# Patient Record
Sex: MALE | Race: WHITE | NOT HISPANIC OR LATINO | Employment: OTHER | URBAN - METROPOLITAN AREA
[De-identification: names, ages, dates, MRNs, and addresses within clinical notes are randomized per-mention and may not be internally consistent; named-entity substitution may affect disease eponyms.]

---

## 2019-10-01 ENCOUNTER — CLINICAL SUPPORT (OUTPATIENT)
Dept: FAMILY MEDICINE | Facility: CLINIC | Age: 84
End: 2019-10-01
Payer: MEDICARE

## 2019-10-01 VITALS — TEMPERATURE: 98 F

## 2019-10-01 DIAGNOSIS — Z23 NEED FOR INFLUENZA VACCINATION: Primary | ICD-10-CM

## 2019-10-01 PROCEDURE — 90662 FLU VACCINE - HIGH DOSE (65+) PRESERVATIVE FREE IM: ICD-10-PCS | Mod: S$GLB,,, | Performed by: NURSE PRACTITIONER

## 2019-10-01 PROCEDURE — 90662 IIV NO PRSV INCREASED AG IM: CPT | Mod: S$GLB,,, | Performed by: NURSE PRACTITIONER

## 2019-10-01 PROCEDURE — G0008 FLU VACCINE - HIGH DOSE (65+) PRESERVATIVE FREE IM: ICD-10-PCS | Mod: S$GLB,,, | Performed by: NURSE PRACTITIONER

## 2019-10-01 PROCEDURE — G0008 ADMIN INFLUENZA VIRUS VAC: HCPCS | Mod: S$GLB,,, | Performed by: NURSE PRACTITIONER

## 2019-10-02 LAB — TSH SERPL-ACNC: 5.87 MIU/L (ref 0.4–4.5)

## 2019-10-08 ENCOUNTER — OFFICE VISIT (OUTPATIENT)
Dept: FAMILY MEDICINE | Facility: CLINIC | Age: 84
End: 2019-10-08
Payer: MEDICARE

## 2019-10-08 VITALS
HEIGHT: 70 IN | SYSTOLIC BLOOD PRESSURE: 132 MMHG | DIASTOLIC BLOOD PRESSURE: 88 MMHG | WEIGHT: 268 LBS | BODY MASS INDEX: 38.37 KG/M2 | HEART RATE: 72 BPM

## 2019-10-08 DIAGNOSIS — I48.0 PAROXYSMAL ATRIAL FIBRILLATION: ICD-10-CM

## 2019-10-08 DIAGNOSIS — E03.9 HYPOTHYROIDISM, UNSPECIFIED TYPE: ICD-10-CM

## 2019-10-08 DIAGNOSIS — I10 HYPERTENSION, UNSPECIFIED TYPE: ICD-10-CM

## 2019-10-08 DIAGNOSIS — E78.5 HYPERLIPIDEMIA, UNSPECIFIED HYPERLIPIDEMIA TYPE: ICD-10-CM

## 2019-10-08 DIAGNOSIS — E66.9 OBESITY, UNSPECIFIED CLASSIFICATION, UNSPECIFIED OBESITY TYPE, UNSPECIFIED WHETHER SERIOUS COMORBIDITY PRESENT: ICD-10-CM

## 2019-10-08 DIAGNOSIS — M17.11 PRIMARY OSTEOARTHRITIS OF RIGHT KNEE: ICD-10-CM

## 2019-10-08 DIAGNOSIS — N18.30 CKD (CHRONIC KIDNEY DISEASE), STAGE III: ICD-10-CM

## 2019-10-08 DIAGNOSIS — Z99.89 AMBULATES WITH CANE: ICD-10-CM

## 2019-10-08 PROBLEM — I48.91 ATRIAL FIBRILLATION: Status: ACTIVE | Noted: 2019-10-08

## 2019-10-08 LAB — HBA1C MFR BLD: 6.5 %

## 2019-10-08 PROCEDURE — 99214 OFFICE O/P EST MOD 30 MIN: CPT | Mod: S$GLB,,, | Performed by: NURSE PRACTITIONER

## 2019-10-08 PROCEDURE — 99214 PR OFFICE/OUTPT VISIT, EST, LEVL IV, 30-39 MIN: ICD-10-PCS | Mod: S$GLB,,, | Performed by: NURSE PRACTITIONER

## 2019-10-08 PROCEDURE — 83036 HEMOGLOBIN GLYCOSYLATED A1C: CPT | Mod: QW,,, | Performed by: NURSE PRACTITIONER

## 2019-10-08 PROCEDURE — 83036 POCT HEMOGLOBIN A1C: ICD-10-PCS | Mod: QW,,, | Performed by: NURSE PRACTITIONER

## 2019-10-08 RX ORDER — LEVOTHYROXINE SODIUM 75 UG/1
75 TABLET ORAL DAILY
Qty: 30 TABLET | Refills: 11 | Status: SHIPPED | OUTPATIENT
Start: 2019-10-08 | End: 2020-04-30

## 2019-10-08 RX ORDER — ACETAMINOPHEN AND CODEINE PHOSPHATE 300; 60 MG/1; MG/1
1-2 TABLET ORAL DAILY
Refills: 5 | COMMUNITY
Start: 2019-09-09 | End: 2020-05-06 | Stop reason: SDUPTHER

## 2019-10-08 RX ORDER — LEVOTHYROXINE SODIUM 25 UG/1
1 TABLET ORAL DAILY
COMMUNITY
Start: 2019-03-15 | End: 2019-10-08 | Stop reason: DRUGHIGH

## 2019-10-08 RX ORDER — INSULIN GLARGINE 100 [IU]/ML
31 INJECTION, SOLUTION SUBCUTANEOUS DAILY
Refills: 5 | COMMUNITY
Start: 2019-09-25 | End: 2020-02-18

## 2019-10-08 NOTE — PROGRESS NOTES
Patient ID: Alex Hatch is a 84 y.o. male.    Chief Complaint: Follow-up and Diabetes    Presents for check up. Doing ok. Recently had tsh drawn. Would like to discuss results. Feels ok. Does not sleep great. Naps some during the day. Has intermittent knee pain. Did get some relief. After injection. Was considering knee brace but felt like the 'salesman; was giving me run around. Uses cane for ambulation. Sugars have been up and down. Had 1episode that felt like sugar dropped. Felt weak and dizzy. Ate candy and felt better. Did not check sugar.           Past Medical History:   Diagnosis Date    Atrial fibrillation     Dr Lux    CKD (chronic kidney disease), stage III     Diabetes mellitus, type 2     Hyperlipidemia     Hypertension      Past Surgical History:   Procedure Laterality Date    CARDIAC PACEMAKER PLACEMENT  5/2013    Dr Lux    COLONOSCOPY W/ POLYPECTOMY  2011    Dr Mar, repeat in 3 years    CORONARY ARTERY BYPASS GRAFT  1993    cassnadra           Tobacco History:  reports that he has never smoked. He has never used smokeless tobacco.      Review of patient's allergies indicates:   Allergen Reactions    Iodinated contrast media        Current Outpatient Medications:     acetaminophen-codeine 300-60mg (TYLENOL #4) 300-60 mg Tab, Take 1-2 tablets by mouth once daily., Disp: , Rfl: 5    amlodipine-benazepril 5-20 mg (LOTREL) 5-20 mg per capsule, Take 1 capsule by mouth once daily., Disp: , Rfl:     blood sugar diagnostic Strp, Check blood glucose 4 times a day - before meals and at bedtime., Disp: 400 strip, Rfl: 3    dorzolamide-timolol 2-0.5% (COSOPT) 2-0.5 % ophthalmic solution, Place 1 drop into both eyes 2 (two) times daily., Disp: , Rfl: 3    hydrALAZINE (APRESOLINE) 50 MG tablet, Take 50 mg by mouth once daily. , Disp: , Rfl:     lancets Misc, Check blood glucose 4 times a day - before meals and at bedtime., Disp: 400 each, Rfl: 3    LANTUS SOLOSTAR U-100 INSULIN glargine  "100 units/mL (3mL) SubQ pen, Inject 31 Units into the skin once daily., Disp: , Rfl: 5    levothyroxine (SYNTHROID) 75 MCG tablet, Take 1 tablet (75 mcg total) by mouth once daily., Disp: 30 tablet, Rfl: 11    lovastatin (MEVACOR) 20 MG tablet, Take 20 mg by mouth every evening.  , Disp: , Rfl:     warfarin (COUMADIN) 5 MG tablet, Take 7 mg by mouth once daily., Disp: , Rfl:     zolpidem (AMBIEN) 10 mg Tab, Take 10 mg by mouth nightly as needed., Disp: , Rfl: 5    Review of Systems   Constitutional: Negative for fatigue, fever and unexpected weight change.   HENT: Negative for ear pain, sinus pressure and sore throat.    Respiratory: Negative for cough and shortness of breath.    Cardiovascular: Negative for chest pain and leg swelling.   Gastrointestinal: Negative for abdominal pain, constipation, nausea and vomiting.   Genitourinary: Negative for dysuria, frequency and urgency.   Musculoskeletal: Negative for arthralgias.        Knee pain   Skin: Negative for rash.   Neurological: Negative for dizziness, weakness and headaches.   Psychiatric/Behavioral: Positive for sleep disturbance.          Objective:      Vitals:    10/08/19 1000   BP: 132/88   Pulse: 72   Weight: 121.6 kg (268 lb)   Height: 5' 9.5" (1.765 m)     Physical Exam   Constitutional: He is oriented to person, place, and time. He appears well-developed and well-nourished.   Obese. Ambulates with cane   HENT:   Right Ear: External ear normal.   Left Ear: External ear normal.   Eyes: Pupils are equal, round, and reactive to light. EOM are normal.   Neck: Normal range of motion. Neck supple.   Cardiovascular: Normal rate, regular rhythm and normal heart sounds.   Pulses:       Dorsalis pedis pulses are 2+ on the right side, and 2+ on the left side.        Posterior tibial pulses are 2+ on the right side.   Pulmonary/Chest: Effort normal and breath sounds normal.   Abdominal: Soft. Bowel sounds are normal.   Musculoskeletal: He exhibits no edema or " deformity.        Right knee: He exhibits decreased range of motion and effusion.        Right foot: There is normal range of motion and no deformity.        Left foot: There is no deformity.   crepitant   Feet:   Right Foot:   Protective Sensation: 5 sites tested. 5 sites sensed.   Skin Integrity: Negative for ulcer or callus.   Left Foot:   Protective Sensation: 5 sites tested. 5 sites sensed.   Skin Integrity: Negative for ulcer, skin breakdown or callus.   Lymphadenopathy:     He has no cervical adenopathy.   Neurological: He is alert and oriented to person, place, and time.   Skin: Skin is warm and dry.   Psychiatric: He has a normal mood and affect. His behavior is normal.         Assessment:       1. Diabetes mellitus type 2, uncontrolled, with complications    2. Hypertension, unspecified type    3. CKD (chronic kidney disease), stage III    4. Hyperlipidemia, unspecified hyperlipidemia type    5. Obesity, unspecified classification, unspecified obesity type, unspecified whether serious comorbidity present    6. Paroxysmal atrial fibrillation    7. Hypothyroidism, unspecified type    8. Ambulates with cane    9. Primary osteoarthritis of right knee           Plan:       Diabetes mellitus type 2, uncontrolled, with complications  Comments:  decrease lantus to 22. currently not taking 31units. takes between 22-26  Orders:  -     Hemoglobin A1C, POCT    Hypertension, unspecified type    CKD (chronic kidney disease), stage III    Hyperlipidemia, unspecified hyperlipidemia type    Obesity, unspecified classification, unspecified obesity type, unspecified whether serious comorbidity present    Paroxysmal atrial fibrillation  Comments:  inr monitored by Dr. Lux    Hypothyroidism, unspecified type  Comments:  incrrease levothyroxine to 75mcg. repeat tsh in 6 weeks  Orders:  -     levothyroxine (SYNTHROID) 75 MCG tablet; Take 1 tablet (75 mcg total) by mouth once daily.  Dispense: 30 tablet; Refill: 11  -     TSH  w/reflex to FT4; Future; Expected date: 10/08/2019    Ambulates with cane    Primary osteoarthritis of right knee      Follow up in about 3 months (around 1/8/2020).        10/8/2019 Leana Perez NP

## 2019-11-07 ENCOUNTER — TELEPHONE (OUTPATIENT)
Dept: FAMILY MEDICINE | Facility: CLINIC | Age: 84
End: 2019-11-07

## 2019-11-15 ENCOUNTER — TELEPHONE (OUTPATIENT)
Dept: FAMILY MEDICINE | Facility: CLINIC | Age: 84
End: 2019-11-15

## 2019-11-15 DIAGNOSIS — M25.511 ACUTE PAIN OF RIGHT SHOULDER: Primary | ICD-10-CM

## 2019-11-15 NOTE — TELEPHONE ENCOUNTER
Patient calling c/o arthritis flare in R shoulder.  Would like visit to have checked.  Patient states when this happens Leana usually orders an xray as well.  Patient requests to have xray orders sent to [facility].     Pt: [phone]  -DN

## 2019-11-18 ENCOUNTER — HOSPITAL ENCOUNTER (OUTPATIENT)
Dept: RADIOLOGY | Facility: HOSPITAL | Age: 84
Discharge: HOME | End: 2019-11-18
Attending: NURSE PRACTITIONER
Payer: MEDICARE

## 2019-11-18 DIAGNOSIS — M25.511 ACUTE PAIN OF RIGHT SHOULDER: ICD-10-CM

## 2019-11-18 PROCEDURE — 73030 X-RAY EXAM OF SHOULDER: CPT | Mod: TC,PO,RT

## 2019-11-19 ENCOUNTER — OFFICE VISIT (OUTPATIENT)
Dept: FAMILY MEDICINE | Facility: CLINIC | Age: 84
End: 2019-11-19
Payer: MEDICARE

## 2019-11-19 VITALS
DIASTOLIC BLOOD PRESSURE: 84 MMHG | HEIGHT: 70 IN | HEART RATE: 80 BPM | BODY MASS INDEX: 38.51 KG/M2 | WEIGHT: 269 LBS | SYSTOLIC BLOOD PRESSURE: 138 MMHG

## 2019-11-19 DIAGNOSIS — G89.29 CHRONIC RIGHT SHOULDER PAIN: ICD-10-CM

## 2019-11-19 DIAGNOSIS — E78.5 HYPERLIPIDEMIA, UNSPECIFIED HYPERLIPIDEMIA TYPE: ICD-10-CM

## 2019-11-19 DIAGNOSIS — I48.0 PAROXYSMAL ATRIAL FIBRILLATION: ICD-10-CM

## 2019-11-19 DIAGNOSIS — I10 HYPERTENSION, UNSPECIFIED TYPE: ICD-10-CM

## 2019-11-19 DIAGNOSIS — N18.30 CKD (CHRONIC KIDNEY DISEASE), STAGE III: ICD-10-CM

## 2019-11-19 DIAGNOSIS — M25.511 CHRONIC RIGHT SHOULDER PAIN: ICD-10-CM

## 2019-11-19 LAB
HBA1C MFR BLD: 6.9 %
TSH SERPL-ACNC: 1.99 MIU/L (ref 0.4–4.5)

## 2019-11-19 PROCEDURE — 20605 INTERMEDIATE JOINT ASPIRATION/INJECTION: ICD-10-PCS | Mod: RT,S$GLB,, | Performed by: NURSE PRACTITIONER

## 2019-11-19 PROCEDURE — 20605 DRAIN/INJ JOINT/BURSA W/O US: CPT | Mod: RT,S$GLB,, | Performed by: NURSE PRACTITIONER

## 2019-11-19 PROCEDURE — 99214 OFFICE O/P EST MOD 30 MIN: CPT | Mod: 25,S$GLB,, | Performed by: NURSE PRACTITIONER

## 2019-11-19 PROCEDURE — 1159F MED LIST DOCD IN RCRD: CPT | Mod: S$GLB,,, | Performed by: NURSE PRACTITIONER

## 2019-11-19 PROCEDURE — 1125F AMNT PAIN NOTED PAIN PRSNT: CPT | Mod: S$GLB,,, | Performed by: NURSE PRACTITIONER

## 2019-11-19 PROCEDURE — 1159F PR MEDICATION LIST DOCUMENTED IN MEDICAL RECORD: ICD-10-PCS | Mod: S$GLB,,, | Performed by: NURSE PRACTITIONER

## 2019-11-19 PROCEDURE — 99214 PR OFFICE/OUTPT VISIT, EST, LEVL IV, 30-39 MIN: ICD-10-PCS | Mod: 25,S$GLB,, | Performed by: NURSE PRACTITIONER

## 2019-11-19 PROCEDURE — 1125F PR PAIN SEVERITY QUANTIFIED, PAIN PRESENT: ICD-10-PCS | Mod: S$GLB,,, | Performed by: NURSE PRACTITIONER

## 2019-11-19 PROCEDURE — 83036 POCT HEMOGLOBIN A1C: ICD-10-PCS | Mod: QW,,, | Performed by: NURSE PRACTITIONER

## 2019-11-19 PROCEDURE — 83036 HEMOGLOBIN GLYCOSYLATED A1C: CPT | Mod: QW,,, | Performed by: NURSE PRACTITIONER

## 2019-11-19 RX ORDER — TRIAMCINOLONE ACETONIDE 40 MG/ML
40 INJECTION, SUSPENSION INTRA-ARTICULAR; INTRAMUSCULAR
Status: COMPLETED | OUTPATIENT
Start: 2019-11-19 | End: 2019-11-19

## 2019-11-19 RX ORDER — LIDOCAINE HYDROCHLORIDE 10 MG/ML
1 INJECTION INFILTRATION; PERINEURAL
Status: COMPLETED | OUTPATIENT
Start: 2019-11-19 | End: 2019-11-19

## 2019-11-19 RX ADMIN — LIDOCAINE HYDROCHLORIDE 1 ML: 10 INJECTION INFILTRATION; PERINEURAL at 01:11

## 2019-11-19 RX ADMIN — TRIAMCINOLONE ACETONIDE 40 MG: 40 INJECTION, SUSPENSION INTRA-ARTICULAR; INTRAMUSCULAR at 01:11

## 2019-11-19 NOTE — PROGRESS NOTES
SUBJECTIVE:    Patient ID: Alex Hatch is a 84 y.o. male.    Chief Complaint: Shoulder Pain (right shoulder - ongoing. did not bring bottles, states he does not need refills -ac)    Presents with complaints of right shoulder pain that seems to get worse.  Had x-ray prior to visit would like to discuss results.  Reports has been having pain off and on times several weeks.  Denies injury or trauma.  Has taken over-the-counter medications with no improvement of symptoms.      Office Visit on 11/19/2019   Component Date Value Ref Range Status    Hemoglobin A1C 11/19/2019 6.9  % Final   Office Visit on 10/08/2019   Component Date Value Ref Range Status    Hemoglobin A1C 10/08/2019 6.5  % Final    TSH w/reflex to FT4 11/18/2019 1.99  0.40 - 4.50 mIU/L Final   Orders Only on 10/01/2019   Component Date Value Ref Range Status    TSH 10/01/2019 5.87* 0.40 - 4.50 mIU/L Final       Past Medical History:   Diagnosis Date    Atrial fibrillation     Dr Lux    CKD (chronic kidney disease), stage III     Diabetes mellitus, type 2     Hyperlipidemia     Hypertension      Past Surgical History:   Procedure Laterality Date    CARDIAC PACEMAKER PLACEMENT  5/2013    Dr Lux    COLONOSCOPY W/ POLYPECTOMY  2011    Dr Mar, repeat in 3 years    CORONARY ARTERY BYPASS GRAFT  1993    cassandra       History reviewed. No pertinent family history.    Marital Status:   Alcohol History:  reports that he does not drink alcohol.  Tobacco History:  reports that he has never smoked. He has never used smokeless tobacco.  Drug History:  reports that he does not use drugs.    Review of patient's allergies indicates:   Allergen Reactions    Iodinated contrast media        Current Outpatient Medications:     acetaminophen-codeine 300-60mg (TYLENOL #4) 300-60 mg Tab, Take 1-2 tablets by mouth once daily., Disp: , Rfl: 5    amlodipine-benazepril 5-20 mg (LOTREL) 5-20 mg per capsule, Take 1 capsule by mouth once daily.,  "Disp: , Rfl:     blood sugar diagnostic Strp, Check blood glucose 4 times a day - before meals and at bedtime., Disp: 400 strip, Rfl: 3    dorzolamide-timolol 2-0.5% (COSOPT) 2-0.5 % ophthalmic solution, Place 1 drop into both eyes 2 (two) times daily., Disp: , Rfl: 3    hydrALAZINE (APRESOLINE) 50 MG tablet, Take 50 mg by mouth once daily. , Disp: , Rfl:     lancets Misc, Check blood glucose 4 times a day - before meals and at bedtime., Disp: 400 each, Rfl: 3    LANTUS SOLOSTAR U-100 INSULIN glargine 100 units/mL (3mL) SubQ pen, Inject 31 Units into the skin once daily., Disp: , Rfl: 5    levothyroxine (SYNTHROID) 75 MCG tablet, Take 1 tablet (75 mcg total) by mouth once daily., Disp: 30 tablet, Rfl: 11    lovastatin (MEVACOR) 20 MG tablet, Take 20 mg by mouth every evening.  , Disp: , Rfl:     warfarin (COUMADIN) 5 MG tablet, Take 7 mg by mouth once daily., Disp: , Rfl:     zolpidem (AMBIEN) 10 mg Tab, Take 10 mg by mouth nightly as needed., Disp: , Rfl: 5    Review of Systems   Constitutional: Negative for unexpected weight change.   HENT: Negative for sore throat.    Eyes: Negative for pain.   Respiratory: Negative for cough and shortness of breath.    Cardiovascular: Negative for chest pain and leg swelling.   Gastrointestinal: Negative for abdominal pain, constipation, nausea and vomiting.   Musculoskeletal: Negative for arthralgias.        Right shoulder pain   Skin: Negative for rash.   Neurological: Negative for dizziness, weakness and headaches.   Psychiatric/Behavioral: Negative for sleep disturbance.          Objective:      Vitals:    11/19/19 1213   BP: 138/84   Pulse: 80   Weight: 122 kg (269 lb)   Height: 5' 10" (1.778 m)     Body mass index is 38.6 kg/m².  Physical Exam   Constitutional: He is oriented to person, place, and time. He appears well-developed and well-nourished.   Neck: Neck supple. No tracheal deviation present.   Cardiovascular: Normal rate and regular rhythm. Exam reveals no " gallop and no friction rub.   No murmur heard.  Pulmonary/Chest: Breath sounds normal. No stridor. He has no wheezes. He has no rales.   Abdominal: Soft. He exhibits no mass. There is no tenderness.   Musculoskeletal: He exhibits no edema or deformity.        Right shoulder: He exhibits decreased range of motion, tenderness and crepitus. He exhibits no effusion.   Lymphadenopathy:     He has no cervical adenopathy.   Neurological: He is alert and oriented to person, place, and time. Coordination normal.   Skin: Skin is warm and dry.   Psychiatric: He has a normal mood and affect. Thought content normal.         Assessment:       1. Diabetes mellitus type 2, uncontrolled, with complications    2. Chronic right shoulder pain    3. Hypertension, unspecified type    4. Hyperlipidemia, unspecified hyperlipidemia type    5. Paroxysmal atrial fibrillation    6. CKD (chronic kidney disease), stage III         Plan:       Diabetes mellitus type 2, uncontrolled, with complications  -     Hemoglobin A1C, POCT    Chronic right shoulder pain  -     Intermediate Joint Aspiration/Injection  -     triamcinolone acetonide injection 40 mg  -     lidocaine HCL 10 mg/ml (1%) injection 1 mL    Hypertension, unspecified type    Hyperlipidemia, unspecified hyperlipidemia type    Paroxysmal atrial fibrillation    CKD (chronic kidney disease), stage III      Follow up in about 3 months (around 2/19/2020).

## 2019-11-19 NOTE — PROCEDURES
Intermediate Joint Aspiration/Injection  Date/Time: 11/19/2019 12:20 PM  Performed by: Leana Perez NP  Authorized by: Leana Perez NP     Consent Done?:  Yes (Written)  Indications:  Pain  Site marked: The procedure site was marked    Timeout: Prior to procedure the correct patient, procedure, and site was verified      Location:  Shoulder  Ultrasonic Guidance for needle placement: No  Needle size:  20 G  Patient tolerance:  Patient tolerated the procedure well with no immediate complications

## 2020-02-18 ENCOUNTER — OFFICE VISIT (OUTPATIENT)
Dept: FAMILY MEDICINE | Facility: CLINIC | Age: 85
End: 2020-02-18
Payer: MEDICARE

## 2020-02-18 VITALS
HEART RATE: 68 BPM | WEIGHT: 270 LBS | SYSTOLIC BLOOD PRESSURE: 110 MMHG | DIASTOLIC BLOOD PRESSURE: 54 MMHG | BODY MASS INDEX: 38.65 KG/M2 | HEIGHT: 70 IN

## 2020-02-18 DIAGNOSIS — E66.9 OBESITY, UNSPECIFIED CLASSIFICATION, UNSPECIFIED OBESITY TYPE, UNSPECIFIED WHETHER SERIOUS COMORBIDITY PRESENT: ICD-10-CM

## 2020-02-18 DIAGNOSIS — I48.0 PAROXYSMAL ATRIAL FIBRILLATION: ICD-10-CM

## 2020-02-18 DIAGNOSIS — M15.9 GENERALIZED OA: ICD-10-CM

## 2020-02-18 DIAGNOSIS — I10 HYPERTENSION, UNSPECIFIED TYPE: ICD-10-CM

## 2020-02-18 DIAGNOSIS — E78.5 HYPERLIPIDEMIA, UNSPECIFIED HYPERLIPIDEMIA TYPE: ICD-10-CM

## 2020-02-18 DIAGNOSIS — N18.30 CKD (CHRONIC KIDNEY DISEASE), STAGE III: ICD-10-CM

## 2020-02-18 LAB — HBA1C MFR BLD: 6.4 %

## 2020-02-18 PROCEDURE — 99214 PR OFFICE/OUTPT VISIT, EST, LEVL IV, 30-39 MIN: ICD-10-PCS | Mod: S$GLB,,, | Performed by: NURSE PRACTITIONER

## 2020-02-18 PROCEDURE — 99214 OFFICE O/P EST MOD 30 MIN: CPT | Mod: S$GLB,,, | Performed by: NURSE PRACTITIONER

## 2020-02-18 PROCEDURE — 83036 HEMOGLOBIN GLYCOSYLATED A1C: CPT | Mod: QW,,, | Performed by: NURSE PRACTITIONER

## 2020-02-18 PROCEDURE — 83036 POCT HEMOGLOBIN A1C: ICD-10-PCS | Mod: QW,,, | Performed by: NURSE PRACTITIONER

## 2020-02-18 RX ORDER — METFORMIN HYDROCHLORIDE 500 MG/1
500 TABLET, EXTENDED RELEASE ORAL 2 TIMES DAILY WITH MEALS
Qty: 180 TABLET | Refills: 3 | Status: SHIPPED | OUTPATIENT
Start: 2020-02-18 | End: 2020-04-30

## 2020-02-18 NOTE — PROGRESS NOTES
SUBJECTIVE:    Patient ID: Alex Hatch is a 84 y.o. male.    Chief Complaint: Diabetes (no bottles, Eye Exam req [facility], set up for foot exam// SW)    84 year old male presents for check up. Overall doing well. Lives with wife. Shoulder and knee pain have resolved. Using cane for ambulation. Has been having some low blood sugar readings. Some as low as 65mg/dl. Feels weak. Chases with candy. Would like to discuss getting off of lantus. Followed regularly by Dr. Lux. Recent inr was 2.2. Has labs ordered for next month.       Office Visit on 11/19/2019   Component Date Value Ref Range Status    Hemoglobin A1C 11/19/2019 6.9  % Final   Office Visit on 10/08/2019   Component Date Value Ref Range Status    Hemoglobin A1C 10/08/2019 6.5  % Final    TSH w/reflex to FT4 11/18/2019 1.99  0.40 - 4.50 mIU/L Final   Orders Only on 10/01/2019   Component Date Value Ref Range Status    TSH 10/01/2019 5.87* 0.40 - 4.50 mIU/L Final       Past Medical History:   Diagnosis Date    Atrial fibrillation     Dr Lux    CKD (chronic kidney disease), stage III     Diabetes mellitus, type 2     Hyperlipidemia     Hypertension      Past Surgical History:   Procedure Laterality Date    CARDIAC PACEMAKER PLACEMENT  5/2013    Dr Lux    COLONOSCOPY W/ POLYPECTOMY  2011    Dr Mar, repeat in 3 years    CORONARY ARTERY BYPASS GRAFT  1993    cassandra       History reviewed. No pertinent family history.    Marital Status:   Alcohol History:  reports that he does not drink alcohol.  Tobacco History:  reports that he has never smoked. He has never used smokeless tobacco.  Drug History:  reports that he does not use drugs.    Review of patient's allergies indicates:   Allergen Reactions    Iodinated contrast media        Current Outpatient Medications:     acetaminophen-codeine 300-60mg (TYLENOL #4) 300-60 mg Tab, Take 1-2 tablets by mouth once daily., Disp: , Rfl: 5    amlodipine-benazepril 5-20 mg (LOTREL)  "5-20 mg per capsule, Take 1 capsule by mouth once daily., Disp: , Rfl:     blood sugar diagnostic Strp, Check blood glucose 4 times a day - before meals and at bedtime., Disp: 400 strip, Rfl: 3    dorzolamide-timolol 2-0.5% (COSOPT) 2-0.5 % ophthalmic solution, Place 1 drop into both eyes 2 (two) times daily., Disp: , Rfl: 3    hydrALAZINE (APRESOLINE) 50 MG tablet, Take 50 mg by mouth once daily. , Disp: , Rfl:     lancets Misc, Check blood glucose 4 times a day - before meals and at bedtime., Disp: 400 each, Rfl: 3    levothyroxine (SYNTHROID) 75 MCG tablet, Take 1 tablet (75 mcg total) by mouth once daily., Disp: 30 tablet, Rfl: 11    lovastatin (MEVACOR) 20 MG tablet, Take 20 mg by mouth every evening.  , Disp: , Rfl:     metFORMIN (GLUCOPHAGE-XR) 500 MG XR 24hr tablet, Take 1 tablet (500 mg total) by mouth 2 (two) times daily with meals., Disp: 180 tablet, Rfl: 3    warfarin (COUMADIN) 5 MG tablet, Take 7 mg by mouth once daily., Disp: , Rfl:     zolpidem (AMBIEN) 10 mg Tab, Take 10 mg by mouth nightly as needed., Disp: , Rfl: 5    Review of Systems   Constitutional: Negative for fatigue, fever and unexpected weight change.   HENT: Negative for ear pain, sinus pressure and sore throat.    Eyes: Negative for pain.   Respiratory: Negative for cough and shortness of breath.    Cardiovascular: Negative for chest pain and leg swelling.   Gastrointestinal: Negative for abdominal pain, constipation, nausea and vomiting.   Genitourinary: Negative for dysuria, frequency and urgency.   Musculoskeletal: Negative for arthralgias.   Skin: Negative for rash.   Neurological: Negative for dizziness, weakness and headaches.   Psychiatric/Behavioral: Negative for sleep disturbance.          Objective:      Vitals:    02/18/20 0915   BP: (!) 110/54   Pulse: 68   Weight: 122.5 kg (270 lb)   Height: 5' 10" (1.778 m)     Body mass index is 38.74 kg/m².  Physical Exam   Constitutional: He is oriented to person, place, and " time. He appears well-developed and well-nourished.   Cane for ambulation   HENT:   Right Ear: External ear normal.   Left Ear: External ear normal.   Mouth/Throat: Oropharynx is clear and moist.   Neck: Normal range of motion. Neck supple. No tracheal deviation present.   Cardiovascular: Normal rate, regular rhythm and normal heart sounds. Exam reveals no gallop and no friction rub.   No murmur heard.  Pulses:       Dorsalis pedis pulses are 2+ on the right side.        Posterior tibial pulses are 2+ on the right side.   Pulmonary/Chest: Effort normal and breath sounds normal. No stridor. He has no wheezes. He has no rales.   Abdominal: Soft. Bowel sounds are normal. He exhibits no mass. There is no tenderness.   Musculoskeletal: Normal range of motion. He exhibits no edema, tenderness or deformity.        Right foot: There is normal range of motion and no deformity.   Crepitus bilaterally   Feet:   Right Foot:   Protective Sensation: 5 sites tested. 5 sites sensed.   Left Foot:   Protective Sensation: 5 sites tested. 5 sites sensed.   Skin Integrity: Negative for skin breakdown.   Lymphadenopathy:     He has no cervical adenopathy.   Neurological: He is alert and oriented to person, place, and time. Coordination normal.   Skin: Skin is warm and dry.   Psychiatric: He has a normal mood and affect. His behavior is normal. Thought content normal.         Assessment:       1. Diabetes mellitus type 2, uncontrolled, with complications    2. Hypertension, unspecified type    3. Hyperlipidemia, unspecified hyperlipidemia type    4. Paroxysmal atrial fibrillation    5. CKD (chronic kidney disease), stage III    6. Obesity, unspecified classification, unspecified obesity type, unspecified whether serious comorbidity present    7. Generalized OA         Plan:       Diabetes mellitus type 2, uncontrolled, with complications  Comments:  ok to discuss stopping lantus. start metformin bid. monitor blood sugar closely. readings  to office in 1 month.   Orders:  -     Hemoglobin A1C, POCT  -     metFORMIN (GLUCOPHAGE-XR) 500 MG XR 24hr tablet; Take 1 tablet (500 mg total) by mouth 2 (two) times daily with meals.  Dispense: 180 tablet; Refill: 3    Hypertension, unspecified type    Hyperlipidemia, unspecified hyperlipidemia type    Paroxysmal atrial fibrillation    CKD (chronic kidney disease), stage III    Obesity, unspecified classification, unspecified obesity type, unspecified whether serious comorbidity present    Generalized OA      Follow up in about 3 months (around 5/18/2020) for medication management.

## 2020-02-28 LAB
ALBUMIN SERPL-MCNC: 4.3 G/DL (ref 3.6–5.1)
ALBUMIN/GLOB SERPL: 1.6 (CALC) (ref 1–2.5)
ALP SERPL-CCNC: 73 U/L (ref 35–144)
ALT SERPL-CCNC: 10 U/L (ref 9–46)
APPEARANCE UR: CLEAR
AST SERPL-CCNC: 15 U/L (ref 10–35)
BACTERIA #/AREA URNS HPF: ABNORMAL /HPF
BACTERIA UR CULT: ABNORMAL
BASOPHILS # BLD AUTO: 84 CELLS/UL (ref 0–200)
BASOPHILS NFR BLD AUTO: 1.1 %
BILIRUB SERPL-MCNC: 0.6 MG/DL (ref 0.2–1.2)
BILIRUB UR QL STRIP: NEGATIVE
BUN SERPL-MCNC: 34 MG/DL (ref 7–25)
BUN/CREAT SERPL: 21 (CALC) (ref 6–22)
CALCIUM SERPL-MCNC: 9.1 MG/DL (ref 8.6–10.3)
CHLORIDE SERPL-SCNC: 103 MMOL/L (ref 98–110)
CHOLEST SERPL-MCNC: 152 MG/DL
CHOLEST/HDLC SERPL: 4 (CALC)
CO2 SERPL-SCNC: 27 MMOL/L (ref 20–32)
COLOR UR: YELLOW
COPY(IES) SENT TO:: NORMAL
CREAT SERPL-MCNC: 1.62 MG/DL (ref 0.7–1.11)
EOSINOPHIL # BLD AUTO: 167 CELLS/UL (ref 15–500)
EOSINOPHIL NFR BLD AUTO: 2.2 %
ERYTHROCYTE [DISTWIDTH] IN BLOOD BY AUTOMATED COUNT: 13.8 % (ref 11–15)
GFRSERPLBLD MDRD-ARVRAT: 39 ML/MIN/1.73M2
GLOBULIN SER CALC-MCNC: 2.7 G/DL (CALC) (ref 1.9–3.7)
GLUCOSE SERPL-MCNC: 84 MG/DL (ref 65–99)
GLUCOSE UR QL STRIP: NEGATIVE
HBA1C MFR BLD: 6.5 % OF TOTAL HGB
HCT VFR BLD AUTO: 46 % (ref 38.5–50)
HDLC SERPL-MCNC: 38 MG/DL
HGB BLD-MCNC: 15 G/DL (ref 13.2–17.1)
HGB UR QL STRIP: NEGATIVE
HYALINE CASTS #/AREA URNS LPF: ABNORMAL /LPF
KETONES UR QL STRIP: NEGATIVE
LDLC SERPL CALC-MCNC: 94 MG/DL (CALC)
LEUKOCYTE ESTERASE UR QL STRIP: NEGATIVE
LYMPHOCYTES # BLD AUTO: 1588 CELLS/UL (ref 850–3900)
LYMPHOCYTES NFR BLD AUTO: 20.9 %
MCH RBC QN AUTO: 27.8 PG (ref 27–33)
MCHC RBC AUTO-ENTMCNC: 32.6 G/DL (ref 32–36)
MCV RBC AUTO: 85.3 FL (ref 80–100)
MONOCYTES # BLD AUTO: 790 CELLS/UL (ref 200–950)
MONOCYTES NFR BLD AUTO: 10.4 %
NEUTROPHILS # BLD AUTO: 4970 CELLS/UL (ref 1500–7800)
NEUTROPHILS NFR BLD AUTO: 65.4 %
NITRITE UR QL STRIP: NEGATIVE
NONHDLC SERPL-MCNC: 114 MG/DL (CALC)
PH UR STRIP: ABNORMAL [PH] (ref 5–8)
PLATELET # BLD AUTO: 197 THOUSAND/UL (ref 140–400)
PMV BLD REES-ECKER: 10 FL (ref 7.5–12.5)
POTASSIUM SERPL-SCNC: 4.5 MMOL/L (ref 3.5–5.3)
PROT SERPL-MCNC: 7 G/DL (ref 6.1–8.1)
PROT UR QL STRIP: ABNORMAL
RBC # BLD AUTO: 5.39 MILLION/UL (ref 4.2–5.8)
RBC #/AREA URNS HPF: ABNORMAL /HPF
SODIUM SERPL-SCNC: 138 MMOL/L (ref 135–146)
SP GR UR STRIP: 1.02 (ref 1–1.03)
SQUAMOUS #/AREA URNS HPF: ABNORMAL /HPF
TRIGL SERPL-MCNC: 100 MG/DL
TSH SERPL-ACNC: 3.59 MIU/L (ref 0.4–4.5)
WBC # BLD AUTO: 7.6 THOUSAND/UL (ref 3.8–10.8)
WBC #/AREA URNS HPF: ABNORMAL /HPF

## 2020-03-17 ENCOUNTER — TELEPHONE (OUTPATIENT)
Dept: FAMILY MEDICINE | Facility: CLINIC | Age: 85
End: 2020-03-17

## 2020-03-17 NOTE — TELEPHONE ENCOUNTER
Patient is concerned that since being switched from lantus to metformin 500mg, he cannot get his blood sugar below 100. He wants to know what to do about this/ba

## 2020-03-18 NOTE — TELEPHONE ENCOUNTER
Spoke to pt. He says his fasting blood sugars are always close to 200. But over the past few days he said instead of taking the med BID, he's breaking the pills in half & taking a 1/2 pill every 4 hours..

## 2020-03-19 NOTE — TELEPHONE ENCOUNTER
Spoke to pt. Patient said he's just going to stick to the metformin. Will let us know if it gets out of control or anything.

## 2020-03-25 NOTE — TELEPHONE ENCOUNTER
Spoke to pt. Patient switched from Lantus to Metformin on 02/18/2020. His blood sugars are still running around 200.

## 2020-03-25 NOTE — TELEPHONE ENCOUNTER
Please call and see what his sugars have been running. Please see how long he has been off of the lantus.

## 2020-03-25 NOTE — TELEPHONE ENCOUNTER
----- Message from Joan Linn sent at 3/25/2020  9:01 AM CDT -----  Contact: Alex Hatch  Needs refill on Lantus insulin , says he needs only 1 box at a time.   He also says that the metformin isn't keeping his sugar down so that's why he wants  To go back to the Lantus.  Send to [facility] on [address]   Pt# [phone]

## 2020-03-26 RX ORDER — INSULIN GLARGINE 100 [IU]/ML
10 INJECTION, SOLUTION SUBCUTANEOUS DAILY
Qty: 1 BOX | Refills: 1 | Status: SHIPPED | OUTPATIENT
Start: 2020-03-26 | End: 2020-07-14 | Stop reason: SDUPTHER

## 2020-03-26 NOTE — TELEPHONE ENCOUNTER
Denied virtual visit. Says he doesn't have the smart phone or access to doing it. Just to let him know if were going to call in the Lantus or what..

## 2020-03-26 NOTE — TELEPHONE ENCOUNTER
Lets add some lantus back. Start 10units daily. Continue metformin bid. Call with readings next week.

## 2020-04-30 RX ORDER — FLUOCINOLONE ACETONIDE 0.1 MG/ML
SOLUTION TOPICAL
Status: ON HOLD | COMMUNITY
Start: 2020-03-24 | End: 2021-09-29

## 2020-05-06 ENCOUNTER — OFFICE VISIT (OUTPATIENT)
Dept: FAMILY MEDICINE | Facility: CLINIC | Age: 85
End: 2020-05-06
Payer: MEDICARE

## 2020-05-06 DIAGNOSIS — M15.9 GENERALIZED OA: ICD-10-CM

## 2020-05-06 DIAGNOSIS — N18.30 CKD (CHRONIC KIDNEY DISEASE), STAGE III: ICD-10-CM

## 2020-05-06 DIAGNOSIS — N40.0 BENIGN PROSTATIC HYPERPLASIA, UNSPECIFIED WHETHER LOWER URINARY TRACT SYMPTOMS PRESENT: ICD-10-CM

## 2020-05-06 DIAGNOSIS — I48.0 PAROXYSMAL ATRIAL FIBRILLATION: ICD-10-CM

## 2020-05-06 DIAGNOSIS — Z79.899 HIGH RISK MEDICATION USE: ICD-10-CM

## 2020-05-06 DIAGNOSIS — I10 HYPERTENSION, UNSPECIFIED TYPE: ICD-10-CM

## 2020-05-06 DIAGNOSIS — E78.5 HYPERLIPIDEMIA, UNSPECIFIED HYPERLIPIDEMIA TYPE: ICD-10-CM

## 2020-05-06 PROCEDURE — 99442 PR PHYSICIAN TELEPHONE EVALUATION 11-20 MIN: CPT | Mod: 95,,, | Performed by: NURSE PRACTITIONER

## 2020-05-06 PROCEDURE — 99442 PR PHYSICIAN TELEPHONE EVALUATION 11-20 MIN: ICD-10-PCS | Mod: 95,,, | Performed by: NURSE PRACTITIONER

## 2020-05-06 RX ORDER — LOVASTATIN 20 MG/1
20 TABLET ORAL NIGHTLY
Qty: 90 TABLET | Refills: 1 | Status: SHIPPED | OUTPATIENT
Start: 2020-05-06 | End: 2021-02-11 | Stop reason: SDUPTHER

## 2020-05-06 RX ORDER — ACETAMINOPHEN AND CODEINE PHOSPHATE 300; 60 MG/1; MG/1
1-2 TABLET ORAL EVERY 8 HOURS PRN
Qty: 60 TABLET | Refills: 0 | Status: SHIPPED | OUTPATIENT
Start: 2020-05-06 | End: 2020-07-14 | Stop reason: SDUPTHER

## 2020-05-06 RX ORDER — PEN NEEDLE, DIABETIC 30 GX3/16"
1 NEEDLE, DISPOSABLE MISCELLANEOUS 2 TIMES DAILY
Qty: 100 EACH | Refills: 3 | Status: SHIPPED | OUTPATIENT
Start: 2020-05-06 | End: 2022-06-06 | Stop reason: SDUPTHER

## 2020-05-06 RX ORDER — TAMSULOSIN HYDROCHLORIDE 0.4 MG/1
0.4 CAPSULE ORAL DAILY
Qty: 30 CAPSULE | Refills: 11 | Status: SHIPPED | OUTPATIENT
Start: 2020-05-06 | End: 2021-01-26 | Stop reason: SDUPTHER

## 2020-05-06 NOTE — PROGRESS NOTES
Subjective:        The chief complaint leading to consultation is: check up  The patient location is:  Home  Visit type: Virtual visit with synchronous audio/video or audio only  This was a phone conversation in lieu of in-person visit due to the coronavirus emergency. Patient acknowledged and agreed to the telephone encounter.     10-year-old male presents for audio visit.  Overall doing okay.  Fasting blood sugars have been less than 150 for the most part.  Has not experienced any low blood sugars.  Suffers daily with joint aches but has been trying to be more active.  Since doing this achiness has improved some.  Lives with wife.  Uses a cane for ambulation.  Does report getting up frequently during the night to urinate.  Occasionally has trouble starting a stream.  No pain with urination.  No hematuria.  No history of any prostate problems.  Followed regularly by Dr. Lux.  Coumadin monitored monthly.      Past Surgical History:   Procedure Laterality Date    CARDIAC PACEMAKER PLACEMENT  5/2013    Dr Lux    COLONOSCOPY W/ POLYPECTOMY  2011    Dr Mar, repeat in 3 years    CORONARY ARTERY BYPASS GRAFT  1993    cassandra       Past Medical History:   Diagnosis Date    Atrial fibrillation     Dr Lux    CKD (chronic kidney disease), stage III     Diabetes mellitus, type 2     Hyperlipidemia     Hypertension      History reviewed. No pertinent family history.     Social History:   Marital Status:   Alcohol History:  reports that he does not drink alcohol.  Tobacco History:  reports that he has never smoked. He has never used smokeless tobacco.  Drug History:  reports that he does not use drugs.    Review of patient's allergies indicates:   Allergen Reactions    Iodinated contrast media        Current Outpatient Medications   Medication Sig Dispense Refill    acetaminophen-codeine 300-60mg (TYLENOL #4) 300-60 mg Tab Take 1-2 tablets by mouth every 8 (eight) hours as needed. 60 tablet 0     "amlodipine-benazepril 5-20 mg (LOTREL) 5-20 mg per capsule Take 1 capsule by mouth once daily.      blood sugar diagnostic Strp Check blood glucose 4 times a day - before meals and at bedtime. 400 strip 3    dorzolamide-timolol 2-0.5% (COSOPT) 2-0.5 % ophthalmic solution Place 1 drop into both eyes 2 (two) times daily.  3    hydrALAZINE (APRESOLINE) 50 MG tablet Take 50 mg by mouth once daily.       insulin (LANTUS SOLOSTAR U-100 INSULIN) glargine 100 units/mL (3mL) SubQ pen Inject 10 Units into the skin once daily. 1 Box 1    lancets Misc Check blood glucose 4 times a day - before meals and at bedtime. 400 each 3    lovastatin (MEVACOR) 20 MG tablet Take 1 tablet (20 mg total) by mouth every evening. 90 tablet 1    warfarin (COUMADIN) 5 MG tablet Take 7 mg by mouth once daily.      zolpidem (AMBIEN) 10 mg Tab Take 10 mg by mouth nightly as needed.  5    fluocinolone (SYNALAR) 0.01 % external solution       pen needle, diabetic 32 gauge x 5/32" Ndle 1 Syringe by Misc.(Non-Drug; Combo Route) route 2 (two) times a day. 100 each 3    tamsulosin (FLOMAX) 0.4 mg Cap Take 1 capsule (0.4 mg total) by mouth once daily. 30 capsule 11     No current facility-administered medications for this visit.        Review of Systems   Constitutional: Negative for fatigue, fever and unexpected weight change.   HENT: Negative for ear pain, sinus pressure and sore throat.    Eyes: Negative for pain.   Respiratory: Negative for cough and shortness of breath.    Cardiovascular: Negative for chest pain and leg swelling.   Gastrointestinal: Negative for abdominal pain, constipation, nausea and vomiting.   Genitourinary: Positive for dysuria, frequency and urgency.   Musculoskeletal: Negative for arthralgias.   Skin: Negative for rash.   Neurological: Negative for dizziness, weakness and headaches.   Psychiatric/Behavioral: Negative for sleep disturbance.         Objective:        Physical Exam:   Physical Exam   Constitutional: He " "appears well-developed and well-nourished. No distress.            Assessment:       1. Diabetes mellitus type 2, uncontrolled, with complications    2. Hypertension, unspecified type    3. Hyperlipidemia, unspecified hyperlipidemia type    4. Generalized OA    5. High risk medication use    6. Benign prostatic hyperplasia, unspecified whether lower urinary tract symptoms present    7. CKD (chronic kidney disease), stage III    8. Paroxysmal atrial fibrillation      Plan:   Diabetes mellitus type 2, uncontrolled, with complications  -     pen needle, diabetic 32 gauge x 5/32" Ndle; 1 Syringe by Misc.(Non-Drug; Combo Route) route 2 (two) times a day.  Dispense: 100 each; Refill: 3    Hypertension, unspecified type    Hyperlipidemia, unspecified hyperlipidemia type  -     lovastatin (MEVACOR) 20 MG tablet; Take 1 tablet (20 mg total) by mouth every evening.  Dispense: 90 tablet; Refill: 1  -     pen needle, diabetic 32 gauge x 5/32" Ndle; 1 Syringe by Misc.(Non-Drug; Combo Route) route 2 (two) times a day.  Dispense: 100 each; Refill: 3    Generalized OA  -     acetaminophen-codeine 300-60mg (TYLENOL #4) 300-60 mg Tab; Take 1-2 tablets by mouth every 8 (eight) hours as needed.  Dispense: 60 tablet; Refill: 0    High risk medication use  -     lovastatin (MEVACOR) 20 MG tablet; Take 1 tablet (20 mg total) by mouth every evening.  Dispense: 90 tablet; Refill: 1  -     acetaminophen-codeine 300-60mg (TYLENOL #4) 300-60 mg Tab; Take 1-2 tablets by mouth every 8 (eight) hours as needed.  Dispense: 60 tablet; Refill: 0  -     pen needle, diabetic 32 gauge x 5/32" Ndle; 1 Syringe by Misc.(Non-Drug; Combo Route) route 2 (two) times a day.  Dispense: 100 each; Refill: 3    Benign prostatic hyperplasia, unspecified whether lower urinary tract symptoms present  Comments:  Start Flomax nightly.  Call if no improvement.  Orders:  -     tamsulosin (FLOMAX) 0.4 mg Cap; Take 1 capsule (0.4 mg total) by mouth once daily.  Dispense: 30 " capsule; Refill: 11    CKD (chronic kidney disease), stage III    Paroxysmal atrial fibrillation      Follow up in about 3 months (around 8/6/2020) for medication management.    Total time spent with patient: 15 min    Each patient to whom he or she provides medical services by telemedicine is:  (1) informed of the relationship between the physician and patient and the respective role of any other health care provider with respect to management of the patient; and (2) notified that he or she may decline to receive medical services by telemedicine and may withdraw from such care at any time.    This note was created using too.me voice recognition software that occasionally misinterprets phrases or words.

## 2020-05-13 ENCOUNTER — TELEPHONE (OUTPATIENT)
Dept: FAMILY MEDICINE | Facility: CLINIC | Age: 85
End: 2020-05-13

## 2020-05-13 NOTE — TELEPHONE ENCOUNTER
----- Message from Kaity Cowart sent at 5/13/2020  9:23 AM CDT -----  Rx for Tamsulosin is working. Do you want him to continue taking it? Please advise. Pt #[phone]

## 2020-06-17 DIAGNOSIS — R60.9 EDEMA, UNSPECIFIED TYPE: Primary | ICD-10-CM

## 2020-06-17 DIAGNOSIS — G47.00 INSOMNIA, UNSPECIFIED TYPE: ICD-10-CM

## 2020-06-17 RX ORDER — ZOLPIDEM TARTRATE 10 MG/1
10 TABLET ORAL NIGHTLY PRN
Qty: 90 TABLET | Refills: 1 | Status: SHIPPED | OUTPATIENT
Start: 2020-06-17 | End: 2020-12-30 | Stop reason: SDUPTHER

## 2020-06-17 RX ORDER — FUROSEMIDE 40 MG/1
40 TABLET ORAL DAILY PRN
Qty: 30 TABLET | Refills: 3 | Status: SHIPPED | OUTPATIENT
Start: 2020-06-17 | End: 2022-03-23 | Stop reason: SDUPTHER

## 2020-06-17 NOTE — TELEPHONE ENCOUNTER
----- Message from Cleopatra Musa MA sent at 6/17/2020  2:37 PM CDT -----  VM @ 1:02p requesting a refill, but no further info left.    Pt is requesting refills:    Furosamide 40 mg  Zolpidem 10 mg    Pharmacy - [facility] [address] [facility]    Pt - [phone]

## 2020-07-14 DIAGNOSIS — Z79.899 HIGH RISK MEDICATION USE: ICD-10-CM

## 2020-07-14 DIAGNOSIS — M15.9 GENERALIZED OA: ICD-10-CM

## 2020-07-14 RX ORDER — INSULIN GLARGINE 100 [IU]/ML
10 INJECTION, SOLUTION SUBCUTANEOUS DAILY
Qty: 1 BOX | Refills: 1 | Status: SHIPPED | OUTPATIENT
Start: 2020-07-14 | End: 2020-07-17 | Stop reason: SDUPTHER

## 2020-07-14 RX ORDER — ACETAMINOPHEN AND CODEINE PHOSPHATE 300; 60 MG/1; MG/1
1-2 TABLET ORAL EVERY 8 HOURS PRN
Qty: 60 TABLET | Refills: 0 | Status: SHIPPED | OUTPATIENT
Start: 2020-07-14 | End: 2020-08-13

## 2020-07-14 NOTE — TELEPHONE ENCOUNTER
----- Message from Joan Linn sent at 7/14/2020  8:35 AM CDT -----  Regarding: Refill  Contact: Alex Hatch  Needs refill on Lantus solostar and Acetaminophene-codeine 4  Send to [facility] on [phone]  Pt# [phone]

## 2020-07-17 RX ORDER — INSULIN GLARGINE 100 [IU]/ML
26 INJECTION, SOLUTION SUBCUTANEOUS DAILY
Qty: 2 BOX | Refills: 1 | Status: ON HOLD | OUTPATIENT
Start: 2020-07-17 | End: 2021-09-29

## 2020-07-17 NOTE — TELEPHONE ENCOUNTER
Rx set up for correct dose/90 days  Please send for Leana    Note:    Spoke to pt. He said we called in his insulin wrong. Pt saying vials but then said the thing where you screw on a needle so I said that is a pen and he said yeah a pen. Told patient I would call & clarify what he's been getting compared to what we called in.    [facility] is what he's been getting for a few months. But that we sent it in for 10 units and patient is saying he injects 26 units. So if he is suppose to be injecting 26 units then they would need 2 boxes called in for a 90 day supply.    Spoke to pt. He clarified he is taking 26 units daily. He does want the pen.

## 2020-07-17 NOTE — TELEPHONE ENCOUNTER
----- Message from Valerie Valerio sent at 7/17/2020  8:35 AM CDT -----  Pt stated he received we sent in his [place] in wrong he is suppose to get the vials 100 units each and he received 10 units sections.  He has not picked up the rx.  Cb # [phone] [facility] [address].

## 2020-10-14 DIAGNOSIS — I73.9 PERIPHERAL VASCULAR DISEASE, UNSPECIFIED: Primary | ICD-10-CM

## 2020-10-16 DIAGNOSIS — I73.9 PERIPHERAL VASCULAR DISEASE, UNSPECIFIED: Primary | ICD-10-CM

## 2020-10-20 ENCOUNTER — CLINICAL SUPPORT (OUTPATIENT)
Dept: CARDIOLOGY | Facility: HOSPITAL | Age: 85
End: 2020-10-20
Attending: PODIATRIST
Payer: MEDICARE

## 2020-10-20 ENCOUNTER — HOSPITAL ENCOUNTER (OUTPATIENT)
Dept: RADIOLOGY | Facility: HOSPITAL | Age: 85
Discharge: HOME | End: 2020-10-20
Attending: PODIATRIST
Payer: MEDICARE

## 2020-10-20 DIAGNOSIS — I73.9 PERIPHERAL VASCULAR DISEASE, UNSPECIFIED: ICD-10-CM

## 2020-10-20 PROCEDURE — 93923 UPR/LXTR ART STDY 3+ LVLS: CPT | Mod: 50

## 2020-10-20 PROCEDURE — 93923 UPR/LXTR ART STDY 3+ LVLS: CPT | Mod: 26,,, | Performed by: INTERNAL MEDICINE

## 2020-10-20 PROCEDURE — 93923 CV SEGMENTAL PRESSURES LOW EXT WO STRESS (CUPID ONLY): ICD-10-PCS | Mod: 26,,, | Performed by: INTERNAL MEDICINE

## 2020-10-23 LAB
LEFT ABI: 0.71
LEFT ARM BP: 129 MMHG
LEFT CALF BP: 155 MMHG
LEFT POSTERIOR TIBIAL: 91 MMHG
LEFT UPPER LEG BP: 259 MMHG
RIGHT ABI: 2.18
RIGHT ARM BP: 120 MMHG
RIGHT CALF BP: 188 MMHG
RIGHT POSTERIOR TIBIAL: 281 MMHG
RIGHT UPPER LEG BP: 253 MMHG

## 2020-11-10 ENCOUNTER — OFFICE VISIT (OUTPATIENT)
Dept: FAMILY MEDICINE | Facility: CLINIC | Age: 85
End: 2020-11-10
Payer: MEDICARE

## 2020-11-10 VITALS
HEART RATE: 80 BPM | BODY MASS INDEX: 37.08 KG/M2 | SYSTOLIC BLOOD PRESSURE: 126 MMHG | DIASTOLIC BLOOD PRESSURE: 70 MMHG | HEIGHT: 70 IN | WEIGHT: 259 LBS | TEMPERATURE: 98 F

## 2020-11-10 DIAGNOSIS — Z79.899 HIGH RISK MEDICATION USE: ICD-10-CM

## 2020-11-10 DIAGNOSIS — N18.31 STAGE 3A CHRONIC KIDNEY DISEASE: ICD-10-CM

## 2020-11-10 DIAGNOSIS — E78.5 HYPERLIPIDEMIA, UNSPECIFIED HYPERLIPIDEMIA TYPE: ICD-10-CM

## 2020-11-10 DIAGNOSIS — I10 HYPERTENSION, UNSPECIFIED TYPE: ICD-10-CM

## 2020-11-10 DIAGNOSIS — N40.0 BENIGN PROSTATIC HYPERPLASIA, UNSPECIFIED WHETHER LOWER URINARY TRACT SYMPTOMS PRESENT: ICD-10-CM

## 2020-11-10 DIAGNOSIS — I48.0 PAROXYSMAL ATRIAL FIBRILLATION: ICD-10-CM

## 2020-11-10 DIAGNOSIS — E03.9 HYPOTHYROIDISM, UNSPECIFIED TYPE: ICD-10-CM

## 2020-11-10 DIAGNOSIS — M15.9 GENERALIZED OA: ICD-10-CM

## 2020-11-10 PROCEDURE — 99214 OFFICE O/P EST MOD 30 MIN: CPT | Mod: S$GLB,,, | Performed by: NURSE PRACTITIONER

## 2020-11-10 PROCEDURE — 99214 PR OFFICE/OUTPT VISIT, EST, LEVL IV, 30-39 MIN: ICD-10-PCS | Mod: S$GLB,,, | Performed by: NURSE PRACTITIONER

## 2020-11-10 NOTE — PROGRESS NOTES
SUBJECTIVE:    Patient ID: Alex Hatch is a 85 y.o. male.    Chief Complaint: Diabetes (no bottles, Eye exam pt will sched, pt states lab work done at [facility] last week A1C was 7.4 will req// SW)    10-year-old male presents for check up.  Overall doing okay.  Fasting blood sugars have been less than 150 for the most part.  Has not experienced any low blood sugars.  Suffers daily with joint aches but has been trying to be more active.  Since doing this achiness has improved some.  Lives with wife.  Uses a cane for ambulation.  Followed regularly by Dr. Lux.  Coumadin monitored monthly. Had labs in 11/20      Clinical Support on 10/20/2020   Component Date Value Ref Range Status    Right calf BP 10/20/2020 188  mmHg Final    Left arm BP 10/20/2020 129  mmHg Final    Right arm BP 10/20/2020 120  mmHg Final    Left posterior tibial 10/20/2020 91  mmHg Final    Right posterior tibial 10/20/2020 281  mmHg Final    Left MAGUE 10/20/2020 0.71   Final    Right MAGUE 10/20/2020 2.18   Final    Left high thigh BP 10/20/2020 259  mmHg Final    Left calf BP 10/20/2020 155  mmHg Final    Right high thigh BP 10/20/2020 253  mmHg Final       Past Medical History:   Diagnosis Date    Atrial fibrillation     Dr Lux    CKD (chronic kidney disease), stage III     Diabetes mellitus, type 2     Hyperlipidemia     Hypertension      Social History     Socioeconomic History    Marital status:      Spouse name: Not on file    Number of children: Not on file    Years of education: Not on file    Highest education level: Not on file   Occupational History    Not on file   Social Needs    Financial resource strain: Not on file    Food insecurity     Worry: Not on file     Inability: Not on file    Transportation needs     Medical: Not on file     Non-medical: Not on file   Tobacco Use    Smoking status: Never Smoker    Smokeless tobacco: Never Used   Substance and Sexual Activity    Alcohol use: No     Drug use: No    Sexual activity: Not on file   Lifestyle    Physical activity     Days per week: Not on file     Minutes per session: Not on file    Stress: Not on file   Relationships    Social connections     Talks on phone: Not on file     Gets together: Not on file     Attends [employer] service: Not on file     Active member of club or organization: Not on file     Attends meetings of clubs or organizations: Not on file     Relationship status: Not on file   Other Topics Concern    Not on file   Social History Narrative    Not on file     Past Surgical History:   Procedure Laterality Date    CARDIAC PACEMAKER PLACEMENT  5/2013    Dr Lux    COLONOSCOPY W/ POLYPECTOMY  2011    Dr Mar, repeat in 3 years    CORONARY ARTERY BYPASS GRAFT  1993    cassandra       History reviewed. No pertinent family history.    Review of patient's allergies indicates:   Allergen Reactions    Iodinated contrast media        Current Outpatient Medications:     amlodipine-benazepril 5-20 mg (LOTREL) 5-20 mg per capsule, Take 1 capsule by mouth once daily., Disp: , Rfl:     blood sugar diagnostic Strp, Check blood glucose 4 times a day - before meals and at bedtime., Disp: 400 strip, Rfl: 3    dorzolamide-timolol 2-0.5% (COSOPT) 2-0.5 % ophthalmic solution, Place 1 drop into both eyes 2 (two) times daily., Disp: , Rfl: 3    fluocinolone (SYNALAR) 0.01 % external solution, , Disp: , Rfl:     furosemide (LASIX) 40 MG tablet, Take 1 tablet (40 mg total) by mouth daily as needed., Disp: 30 tablet, Rfl: 3    hydrALAZINE (APRESOLINE) 50 MG tablet, Take 50 mg by mouth once daily. , Disp: , Rfl:     insulin (LANTUS SOLOSTAR U-100 INSULIN) glargine 100 units/mL (3mL) SubQ pen, Inject 26 Units into the skin once daily., Disp: 2 Box, Rfl: 1    lancets Misc, Check blood glucose 4 times a day - before meals and at bedtime., Disp: 400 each, Rfl: 3    lovastatin (MEVACOR) 20 MG tablet, Take 1 tablet (20 mg total) by mouth every  "evening., Disp: 90 tablet, Rfl: 1    pen needle, diabetic 32 gauge x 5/32" Ndle, 1 Syringe by Misc.(Non-Drug; Combo Route) route 2 (two) times a day., Disp: 100 each, Rfl: 3    tamsulosin (FLOMAX) 0.4 mg Cap, Take 1 capsule (0.4 mg total) by mouth once daily., Disp: 30 capsule, Rfl: 11    warfarin (COUMADIN) 5 MG tablet, Take 7 mg by mouth once daily., Disp: , Rfl:     zolpidem (AMBIEN) 10 mg Tab, Take 1 tablet (10 mg total) by mouth nightly as needed., Disp: 90 tablet, Rfl: 1    Review of Systems   Constitutional: Negative for fatigue, fever and unexpected weight change.   HENT: Negative for ear pain, sinus pressure and sore throat.    Eyes: Negative for pain.   Respiratory: Negative for cough and shortness of breath.    Cardiovascular: Negative for chest pain and leg swelling.   Gastrointestinal: Negative for abdominal pain, constipation, nausea and vomiting.   Genitourinary: Negative for dysuria, frequency and urgency.   Musculoskeletal: Negative for arthralgias.   Skin: Negative for rash.   Neurological: Negative for dizziness, weakness and headaches.   Psychiatric/Behavioral: Negative for sleep disturbance.          Objective:      Vitals:    11/10/20 1336   BP: 126/70   Pulse: 80   Temp: 97.8 °F (36.6 °C)   Weight: 117.5 kg (259 lb)   Height: 5' 10" (1.778 m)     Physical Exam  Constitutional:       Appearance: He is well-developed.   Neck:      Musculoskeletal: Neck supple.      Trachea: No tracheal deviation.   Cardiovascular:      Rate and Rhythm: Normal rate and regular rhythm.      Heart sounds: No murmur. No friction rub. No gallop.    Pulmonary:      Breath sounds: Normal breath sounds. No stridor. No wheezing or rales.   Abdominal:      Palpations: Abdomen is soft. There is no mass.      Tenderness: There is no abdominal tenderness.   Musculoskeletal:         General: No deformity.      Right shoulder: He exhibits decreased range of motion, tenderness and crepitus. He exhibits no effusion. "   Lymphadenopathy:      Cervical: No cervical adenopathy.   Skin:     General: Skin is warm and dry.   Neurological:      Mental Status: He is alert and oriented to person, place, and time.      Coordination: Coordination normal.   Psychiatric:         Thought Content: Thought content normal.           Assessment:       1. Diabetes mellitus type 2, uncontrolled, with complications    2. High risk medication use    3. Paroxysmal atrial fibrillation    4. Hypertension, unspecified type    5. Hyperlipidemia, unspecified hyperlipidemia type    6. Stage 3a chronic kidney disease    7. Generalized OA    8. Hypothyroidism, unspecified type    9. Benign prostatic hyperplasia, unspecified whether lower urinary tract symptoms present         Plan:       Diabetes mellitus type 2, uncontrolled, with complications    High risk medication use    Paroxysmal atrial fibrillation    Hypertension, unspecified type    Hyperlipidemia, unspecified hyperlipidemia type    Stage 3a chronic kidney disease    Generalized OA    Hypothyroidism, unspecified type    Benign prostatic hyperplasia, unspecified whether lower urinary tract symptoms present      Follow up in about 3 months (around 2/10/2021) for medication management, Diabetic Check-Up.        11/16/2020 Leana Perez

## 2020-12-01 NOTE — TELEPHONE ENCOUNTER
----- Message from Ad Mccarthy sent at 12/1/2020 11:21 AM CST -----  Regarding: refills  Metformin   [facility] [address]   Pt [phone]

## 2020-12-02 RX ORDER — METFORMIN HYDROCHLORIDE 500 MG/1
500 TABLET ORAL 2 TIMES DAILY WITH MEALS
COMMUNITY
End: 2020-12-02 | Stop reason: SDUPTHER
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
## 2020-12-03 RX ORDER — METFORMIN HYDROCHLORIDE 500 MG/1
500 TABLET ORAL 2 TIMES DAILY WITH MEALS
Qty: 180 TABLET | Refills: 1 | Status: ON HOLD | OUTPATIENT
Start: 2020-12-03 | End: 2021-09-29

## 2020-12-30 DIAGNOSIS — G47.00 INSOMNIA, UNSPECIFIED TYPE: ICD-10-CM

## 2020-12-30 RX ORDER — ZOLPIDEM TARTRATE 10 MG/1
10 TABLET ORAL NIGHTLY PRN
Qty: 90 TABLET | Refills: 1 | Status: SHIPPED | OUTPATIENT
Start: 2020-12-30 | End: 2021-07-21 | Stop reason: SDUPTHER

## 2020-12-30 NOTE — TELEPHONE ENCOUNTER
----- Message from Ad Mccarthy sent at 12/30/2020  9:07 AM CST -----  Regarding: refills  Ambien   [facility] [address]   Pt [phone]

## 2021-01-26 DIAGNOSIS — N40.0 BENIGN PROSTATIC HYPERPLASIA, UNSPECIFIED WHETHER LOWER URINARY TRACT SYMPTOMS PRESENT: ICD-10-CM

## 2021-01-26 RX ORDER — TAMSULOSIN HYDROCHLORIDE 0.4 MG/1
0.4 CAPSULE ORAL DAILY
Qty: 30 CAPSULE | Refills: 11 | Status: SHIPPED | OUTPATIENT
Start: 2021-01-26 | End: 2021-05-05 | Stop reason: SDUPTHER

## 2021-01-27 ENCOUNTER — TELEPHONE (OUTPATIENT)
Dept: FAMILY MEDICINE | Facility: CLINIC | Age: 86
End: 2021-01-27

## 2021-01-27 DIAGNOSIS — Z79.899 ENCOUNTER FOR LONG-TERM (CURRENT) USE OF OTHER MEDICATIONS: Primary | ICD-10-CM

## 2021-02-04 ENCOUNTER — TELEPHONE (OUTPATIENT)
Dept: FAMILY MEDICINE | Facility: CLINIC | Age: 86
End: 2021-02-04

## 2021-02-04 LAB
ALBUMIN SERPL-MCNC: 4.4 G/DL (ref 3.6–5.1)
ALBUMIN/CREAT UR: 48 MCG/MG CREAT
ALBUMIN/GLOB SERPL: 1.6 (CALC) (ref 1–2.5)
ALP SERPL-CCNC: 86 U/L (ref 35–144)
ALT SERPL-CCNC: 18 U/L (ref 9–46)
APPEARANCE UR: CLEAR
AST SERPL-CCNC: 15 U/L (ref 10–35)
BACTERIA #/AREA URNS HPF: ABNORMAL /HPF
BACTERIA UR CULT: ABNORMAL
BASOPHILS # BLD AUTO: 62 CELLS/UL (ref 0–200)
BASOPHILS NFR BLD AUTO: 0.8 %
BILIRUB SERPL-MCNC: 0.6 MG/DL (ref 0.2–1.2)
BILIRUB UR QL STRIP: NEGATIVE
BUN SERPL-MCNC: 28 MG/DL (ref 7–25)
BUN/CREAT SERPL: 15 (CALC) (ref 6–22)
CALCIUM SERPL-MCNC: 9.4 MG/DL (ref 8.6–10.3)
CHLORIDE SERPL-SCNC: 99 MMOL/L (ref 98–110)
CHOLEST SERPL-MCNC: 162 MG/DL
CHOLEST/HDLC SERPL: 3.8 (CALC)
CO2 SERPL-SCNC: 27 MMOL/L (ref 20–32)
COLOR UR: YELLOW
CREAT SERPL-MCNC: 1.81 MG/DL (ref 0.7–1.11)
CREAT UR-MCNC: 67 MG/DL (ref 20–320)
EOSINOPHIL # BLD AUTO: 208 CELLS/UL (ref 15–500)
EOSINOPHIL NFR BLD AUTO: 2.7 %
ERYTHROCYTE [DISTWIDTH] IN BLOOD BY AUTOMATED COUNT: 13.2 % (ref 11–15)
GFRSERPLBLD MDRD-ARVRAT: 34 ML/MIN/1.73M2
GLOBULIN SER CALC-MCNC: 2.8 G/DL (CALC) (ref 1.9–3.7)
GLUCOSE SERPL-MCNC: 425 MG/DL (ref 65–99)
GLUCOSE UR QL STRIP: ABNORMAL
HCT VFR BLD AUTO: 47 % (ref 38.5–50)
HDLC SERPL-MCNC: 43 MG/DL
HGB BLD-MCNC: 14.5 G/DL (ref 13.2–17.1)
HGB UR QL STRIP: NEGATIVE
HYALINE CASTS #/AREA URNS LPF: ABNORMAL /LPF
KETONES UR QL STRIP: NEGATIVE
LDLC SERPL CALC-MCNC: 96 MG/DL (CALC)
LEUKOCYTE ESTERASE UR QL STRIP: NEGATIVE
LYMPHOCYTES # BLD AUTO: 1563 CELLS/UL (ref 850–3900)
LYMPHOCYTES NFR BLD AUTO: 20.3 %
MCH RBC QN AUTO: 27 PG (ref 27–33)
MCHC RBC AUTO-ENTMCNC: 30.9 G/DL (ref 32–36)
MCV RBC AUTO: 87.5 FL (ref 80–100)
MICROALBUMIN UR-MCNC: 3.2 MG/DL
MONOCYTES # BLD AUTO: 847 CELLS/UL (ref 200–950)
MONOCYTES NFR BLD AUTO: 11 %
NEUTROPHILS # BLD AUTO: 5020 CELLS/UL (ref 1500–7800)
NEUTROPHILS NFR BLD AUTO: 65.2 %
NITRITE UR QL STRIP: NEGATIVE
NONHDLC SERPL-MCNC: 119 MG/DL (CALC)
PH UR STRIP: ABNORMAL [PH] (ref 5–8)
PLATELET # BLD AUTO: 186 THOUSAND/UL (ref 140–400)
PMV BLD REES-ECKER: 10.7 FL (ref 7.5–12.5)
POTASSIUM SERPL-SCNC: 4.4 MMOL/L (ref 3.5–5.3)
PROT SERPL-MCNC: 7.2 G/DL (ref 6.1–8.1)
PROT UR QL STRIP: NEGATIVE
RBC # BLD AUTO: 5.37 MILLION/UL (ref 4.2–5.8)
RBC #/AREA URNS HPF: ABNORMAL /HPF
SODIUM SERPL-SCNC: 137 MMOL/L (ref 135–146)
SP GR UR STRIP: 1.02 (ref 1–1.03)
SQUAMOUS #/AREA URNS HPF: ABNORMAL /HPF
TRIGL SERPL-MCNC: 124 MG/DL
TSH SERPL-ACNC: 3.97 MIU/L (ref 0.4–4.5)
WBC # BLD AUTO: 7.7 THOUSAND/UL (ref 3.8–10.8)
WBC #/AREA URNS HPF: ABNORMAL /HPF

## 2021-02-11 ENCOUNTER — OFFICE VISIT (OUTPATIENT)
Dept: FAMILY MEDICINE | Facility: CLINIC | Age: 86
End: 2021-02-11
Payer: MEDICARE

## 2021-02-11 VITALS
BODY MASS INDEX: 36.22 KG/M2 | WEIGHT: 253 LBS | DIASTOLIC BLOOD PRESSURE: 70 MMHG | HEIGHT: 70 IN | SYSTOLIC BLOOD PRESSURE: 128 MMHG | HEART RATE: 69 BPM

## 2021-02-11 DIAGNOSIS — T30.0 BURN: ICD-10-CM

## 2021-02-11 DIAGNOSIS — I10 HYPERTENSION, UNSPECIFIED TYPE: ICD-10-CM

## 2021-02-11 DIAGNOSIS — I48.0 PAROXYSMAL ATRIAL FIBRILLATION: ICD-10-CM

## 2021-02-11 DIAGNOSIS — Z79.899 ENCOUNTER FOR LONG-TERM (CURRENT) USE OF OTHER MEDICATIONS: ICD-10-CM

## 2021-02-11 DIAGNOSIS — E78.5 HYPERLIPIDEMIA, UNSPECIFIED HYPERLIPIDEMIA TYPE: ICD-10-CM

## 2021-02-11 DIAGNOSIS — M15.9 GENERALIZED OA: ICD-10-CM

## 2021-02-11 DIAGNOSIS — E03.9 HYPOTHYROIDISM, UNSPECIFIED TYPE: ICD-10-CM

## 2021-02-11 DIAGNOSIS — N18.31 STAGE 3A CHRONIC KIDNEY DISEASE: ICD-10-CM

## 2021-02-11 DIAGNOSIS — Z79.899 HIGH RISK MEDICATION USE: ICD-10-CM

## 2021-02-11 LAB — HBA1C MFR BLD: 12 %

## 2021-02-11 PROCEDURE — 99214 OFFICE O/P EST MOD 30 MIN: CPT | Mod: S$GLB,,, | Performed by: NURSE PRACTITIONER

## 2021-02-11 PROCEDURE — 83036 POCT HEMOGLOBIN A1C: ICD-10-PCS | Mod: QW,,, | Performed by: NURSE PRACTITIONER

## 2021-02-11 PROCEDURE — 83036 HEMOGLOBIN GLYCOSYLATED A1C: CPT | Mod: QW,,, | Performed by: NURSE PRACTITIONER

## 2021-02-11 PROCEDURE — 99214 PR OFFICE/OUTPT VISIT, EST, LEVL IV, 30-39 MIN: ICD-10-PCS | Mod: S$GLB,,, | Performed by: NURSE PRACTITIONER

## 2021-02-11 RX ORDER — LOVASTATIN 20 MG/1
20 TABLET ORAL NIGHTLY
Qty: 90 TABLET | Refills: 1 | Status: SHIPPED | OUTPATIENT
Start: 2021-02-11 | End: 2021-05-05 | Stop reason: SDUPTHER

## 2021-02-11 RX ORDER — INSULIN PUMP SYRINGE, 3 ML
EACH MISCELLANEOUS
Qty: 1 EACH | Refills: 0 | Status: SHIPPED | OUTPATIENT
Start: 2021-02-11 | End: 2023-09-14

## 2021-02-11 RX ORDER — LANCETS
EACH MISCELLANEOUS
Qty: 100 EACH | Refills: 0 | Status: SHIPPED | OUTPATIENT
Start: 2021-02-11

## 2021-02-25 ENCOUNTER — TELEPHONE (OUTPATIENT)
Dept: FAMILY MEDICINE | Facility: CLINIC | Age: 86
End: 2021-02-25

## 2021-03-05 ENCOUNTER — IMMUNIZATION (OUTPATIENT)
Dept: FAMILY MEDICINE | Facility: CLINIC | Age: 86
End: 2021-03-05
Payer: MEDICARE

## 2021-03-05 DIAGNOSIS — Z23 NEED FOR VACCINATION: Primary | ICD-10-CM

## 2021-03-05 PROCEDURE — 0001A COVID-19, MRNA, LNP-S, PF, 30 MCG/0.3 ML DOSE VACCINE: ICD-10-PCS | Mod: CV19,,, | Performed by: FAMILY MEDICINE

## 2021-03-05 PROCEDURE — 91300 COVID-19, MRNA, LNP-S, PF, 30 MCG/0.3 ML DOSE VACCINE: ICD-10-PCS | Mod: ,,, | Performed by: FAMILY MEDICINE

## 2021-03-05 PROCEDURE — 0001A COVID-19, MRNA, LNP-S, PF, 30 MCG/0.3 ML DOSE VACCINE: CPT | Mod: CV19,,, | Performed by: FAMILY MEDICINE

## 2021-03-05 PROCEDURE — 91300 COVID-19, MRNA, LNP-S, PF, 30 MCG/0.3 ML DOSE VACCINE: CPT | Mod: ,,, | Performed by: FAMILY MEDICINE

## 2021-03-15 ENCOUNTER — OFFICE VISIT (OUTPATIENT)
Dept: FAMILY MEDICINE | Facility: CLINIC | Age: 86
End: 2021-03-15
Payer: MEDICARE

## 2021-03-15 VITALS
DIASTOLIC BLOOD PRESSURE: 50 MMHG | HEIGHT: 70 IN | HEART RATE: 76 BPM | BODY MASS INDEX: 35.07 KG/M2 | SYSTOLIC BLOOD PRESSURE: 94 MMHG | WEIGHT: 245 LBS

## 2021-03-15 DIAGNOSIS — M25.521 RIGHT ELBOW PAIN: ICD-10-CM

## 2021-03-15 DIAGNOSIS — I10 HYPERTENSION, UNSPECIFIED TYPE: ICD-10-CM

## 2021-03-15 DIAGNOSIS — M25.519 ACUTE SHOULDER PAIN, UNSPECIFIED LATERALITY: ICD-10-CM

## 2021-03-15 DIAGNOSIS — I48.0 PAROXYSMAL ATRIAL FIBRILLATION: ICD-10-CM

## 2021-03-15 DIAGNOSIS — M15.9 GENERALIZED OA: ICD-10-CM

## 2021-03-15 DIAGNOSIS — M25.531 RIGHT WRIST PAIN: ICD-10-CM

## 2021-03-15 DIAGNOSIS — E78.5 HYPERLIPIDEMIA, UNSPECIFIED HYPERLIPIDEMIA TYPE: ICD-10-CM

## 2021-03-15 DIAGNOSIS — M25.511 ACUTE PAIN OF RIGHT SHOULDER: ICD-10-CM

## 2021-03-15 DIAGNOSIS — Z79.899 HIGH RISK MEDICATION USE: ICD-10-CM
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
## 2021-03-15 LAB — HBA1C MFR BLD: 10.4 %

## 2021-03-15 PROCEDURE — 99214 PR OFFICE/OUTPT VISIT, EST, LEVL IV, 30-39 MIN: ICD-10-PCS | Mod: S$GLB,,, | Performed by: NURSE PRACTITIONER

## 2021-03-15 PROCEDURE — 99214 OFFICE O/P EST MOD 30 MIN: CPT | Mod: S$GLB,,, | Performed by: NURSE PRACTITIONER

## 2021-03-15 PROCEDURE — 83036 POCT HEMOGLOBIN A1C: ICD-10-PCS | Mod: QW,,, | Performed by: NURSE PRACTITIONER

## 2021-03-15 PROCEDURE — 83036 HEMOGLOBIN GLYCOSYLATED A1C: CPT | Mod: QW,,, | Performed by: NURSE PRACTITIONER

## 2021-03-15 RX ORDER — ACETAMINOPHEN AND CODEINE PHOSPHATE 300; 60 MG/1; MG/1
TABLET ORAL
COMMUNITY
Start: 2021-02-12

## 2021-03-15 RX ORDER — CLOBETASOL PROPIONATE 0.46 MG/ML
SOLUTION TOPICAL
Status: ON HOLD | COMMUNITY
Start: 2021-01-14 | End: 2021-09-29

## 2021-03-15 RX ORDER — KETOCONAZOLE 20 MG/ML
SHAMPOO, SUSPENSION TOPICAL
Status: ON HOLD | COMMUNITY
Start: 2021-02-13 | End: 2021-09-29

## 2021-03-16 ENCOUNTER — HOSPITAL ENCOUNTER (OUTPATIENT)
Dept: RADIOLOGY | Facility: HOSPITAL | Age: 86
Discharge: HOME | End: 2021-03-16
Attending: NURSE PRACTITIONER
Payer: MEDICARE
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
## 2021-03-16 DIAGNOSIS — M25.521 RIGHT ELBOW PAIN: ICD-10-CM

## 2021-03-16 DIAGNOSIS — M25.511 ACUTE PAIN OF RIGHT SHOULDER: ICD-10-CM
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
## 2021-03-16 DIAGNOSIS — T82.120A DISPLACEMENT OF CARDIAC ELECTRODE, INITIAL ENCOUNTER: ICD-10-CM

## 2021-03-16 DIAGNOSIS — M25.531 RIGHT WRIST PAIN: ICD-10-CM

## 2021-03-16 PROCEDURE — 73030 X-RAY EXAM OF SHOULDER: CPT | Mod: TC,PO,RT

## 2021-03-16 PROCEDURE — 73110 X-RAY EXAM OF WRIST: CPT | Mod: TC,PO,RT

## 2021-03-16 PROCEDURE — 73080 X-RAY EXAM OF ELBOW: CPT | Mod: TC,PO,RT

## 2021-03-16 PROCEDURE — 71046 X-RAY EXAM CHEST 2 VIEWS: CPT | Mod: TC,PO

## 2021-03-18 ENCOUNTER — OFFICE VISIT (OUTPATIENT)
Dept: ORTHOPEDICS | Facility: CLINIC | Age: 86
End: 2021-03-18
Payer: MEDICARE

## 2021-03-18 ENCOUNTER — TELEPHONE (OUTPATIENT)
Dept: FAMILY MEDICINE | Facility: CLINIC | Age: 86
End: 2021-03-18

## 2021-03-18 VITALS
WEIGHT: 245 LBS | BODY MASS INDEX: 35.07 KG/M2 | DIASTOLIC BLOOD PRESSURE: 70 MMHG | HEIGHT: 70 IN | HEART RATE: 70 BPM | SYSTOLIC BLOOD PRESSURE: 130 MMHG

## 2021-03-18 DIAGNOSIS — S52.134A CLOSED NONDISPLACED FRACTURE OF NECK OF RIGHT RADIUS, INITIAL ENCOUNTER: Primary | ICD-10-CM

## 2021-03-18 PROCEDURE — 99204 PR OFFICE/OUTPT VISIT, NEW, LEVL IV, 45-59 MIN: ICD-10-PCS | Mod: 57,S$GLB,, | Performed by: ORTHOPAEDIC SURGERY

## 2021-03-18 PROCEDURE — 99204 OFFICE O/P NEW MOD 45 MIN: CPT | Mod: 57,S$GLB,, | Performed by: ORTHOPAEDIC SURGERY

## 2021-03-18 PROCEDURE — 24650 PR CLOSED RX RADIAL HEAD/NECK FX: ICD-10-PCS | Mod: RT,S$GLB,, | Performed by: ORTHOPAEDIC SURGERY

## 2021-03-18 PROCEDURE — 24650 CLTX RDL HEAD/NCK FX WO MNPJ: CPT | Mod: RT,S$GLB,, | Performed by: ORTHOPAEDIC SURGERY

## 2021-03-26 ENCOUNTER — IMMUNIZATION (OUTPATIENT)
Dept: FAMILY MEDICINE | Facility: CLINIC | Age: 86
End: 2021-03-26
Payer: MEDICARE

## 2021-03-26 DIAGNOSIS — Z23 NEED FOR VACCINATION: Primary | ICD-10-CM

## 2021-03-26 PROCEDURE — 0002A COVID-19, MRNA, LNP-S, PF, 30 MCG/0.3 ML DOSE VACCINE: CPT | Mod: CV19,S$GLB,, | Performed by: FAMILY MEDICINE

## 2021-03-26 PROCEDURE — 91300 COVID-19, MRNA, LNP-S, PF, 30 MCG/0.3 ML DOSE VACCINE: CPT | Mod: S$GLB,,, | Performed by: FAMILY MEDICINE

## 2021-03-26 PROCEDURE — 0002A COVID-19, MRNA, LNP-S, PF, 30 MCG/0.3 ML DOSE VACCINE: ICD-10-PCS | Mod: CV19,S$GLB,, | Performed by: FAMILY MEDICINE

## 2021-03-26 PROCEDURE — 91300 COVID-19, MRNA, LNP-S, PF, 30 MCG/0.3 ML DOSE VACCINE: ICD-10-PCS | Mod: S$GLB,,, | Performed by: FAMILY MEDICINE

## 2021-05-03 ENCOUNTER — TELEPHONE (OUTPATIENT)
Dept: FAMILY MEDICINE | Facility: CLINIC | Age: 86
End: 2021-05-03

## 2021-05-05 ENCOUNTER — CLINICAL SUPPORT (OUTPATIENT)
Dept: FAMILY MEDICINE | Facility: CLINIC | Age: 86
End: 2021-05-05
Payer: MEDICARE

## 2021-05-05 DIAGNOSIS — Z79.899 HIGH RISK MEDICATION USE: ICD-10-CM

## 2021-05-05 DIAGNOSIS — E78.5 HYPERLIPIDEMIA, UNSPECIFIED HYPERLIPIDEMIA TYPE: ICD-10-CM

## 2021-05-05 DIAGNOSIS — N40.0 BENIGN PROSTATIC HYPERPLASIA, UNSPECIFIED WHETHER LOWER URINARY TRACT SYMPTOMS PRESENT: ICD-10-CM

## 2021-05-05 RX ORDER — TAMSULOSIN HYDROCHLORIDE 0.4 MG/1
0.4 CAPSULE ORAL DAILY
Qty: 90 CAPSULE | Refills: 1 | Status: SHIPPED | OUTPATIENT
Start: 2021-05-05 | End: 2021-09-16

## 2021-05-05 RX ORDER — LOVASTATIN 20 MG/1
20 TABLET ORAL NIGHTLY
Qty: 90 TABLET | Refills: 1 | Status: SHIPPED | OUTPATIENT
Start: 2021-05-05 | End: 2021-11-04 | Stop reason: SDUPTHER

## 2021-07-21 DIAGNOSIS — G47.00 INSOMNIA, UNSPECIFIED TYPE: ICD-10-CM

## 2021-07-21 RX ORDER — ZOLPIDEM TARTRATE 10 MG/1
10 TABLET ORAL NIGHTLY PRN
Qty: 30 TABLET | Refills: 0 | Status: SHIPPED | OUTPATIENT
Start: 2021-07-21 | End: 2021-09-15 | Stop reason: SDUPTHER

## 2021-08-04 ENCOUNTER — TELEPHONE (OUTPATIENT)
Dept: FAMILY MEDICINE | Facility: CLINIC | Age: 86
End: 2021-08-04

## 2021-08-04 DIAGNOSIS — E78.5 HYPERLIPIDEMIA, UNSPECIFIED HYPERLIPIDEMIA TYPE: ICD-10-CM

## 2021-08-04 DIAGNOSIS — Z79.899 ENCOUNTER FOR LONG-TERM (CURRENT) USE OF OTHER MEDICATIONS: Primary | ICD-10-CM

## 2021-08-17 ENCOUNTER — TELEPHONE (OUTPATIENT)
Dept: FAMILY MEDICINE | Facility: CLINIC | Age: 86
End: 2021-08-17

## 2021-08-18 ENCOUNTER — TELEPHONE (OUTPATIENT)
Dept: FAMILY MEDICINE | Facility: CLINIC | Age: 86
End: 2021-08-18

## 2021-08-18 LAB
ALBUMIN SERPL-MCNC: 4.1 G/DL (ref 3.6–5.1)
ALBUMIN/GLOB SERPL: 1.8 (CALC) (ref 1–2.5)
ALP SERPL-CCNC: 61 U/L (ref 35–144)
ALT SERPL-CCNC: 9 U/L (ref 9–46)
AST SERPL-CCNC: 14 U/L (ref 10–35)
BILIRUB SERPL-MCNC: 0.6 MG/DL (ref 0.2–1.2)
BUN SERPL-MCNC: 27 MG/DL (ref 7–25)
BUN/CREAT SERPL: 20 (CALC) (ref 6–22)
CALCIUM SERPL-MCNC: 8.8 MG/DL (ref 8.6–10.3)
CHLORIDE SERPL-SCNC: 106 MMOL/L (ref 98–110)
CHOLEST SERPL-MCNC: 133 MG/DL
CHOLEST/HDLC SERPL: 3.3 (CALC)
CO2 SERPL-SCNC: 25 MMOL/L (ref 20–32)
CREAT SERPL-MCNC: 1.32 MG/DL (ref 0.7–1.11)
GLOBULIN SER CALC-MCNC: 2.3 G/DL (CALC) (ref 1.9–3.7)
GLUCOSE SERPL-MCNC: 207 MG/DL (ref 65–99)
HBA1C MFR BLD: 9.1 % OF TOTAL HGB
HDLC SERPL-MCNC: 40 MG/DL
LDLC SERPL CALC-MCNC: 77 MG/DL (CALC)
NONHDLC SERPL-MCNC: 93 MG/DL (CALC)
POTASSIUM SERPL-SCNC: 4.4 MMOL/L (ref 3.5–5.3)
PROT SERPL-MCNC: 6.4 G/DL (ref 6.1–8.1)
SODIUM SERPL-SCNC: 140 MMOL/L (ref 135–146)
TRIGL SERPL-MCNC: 81 MG/DL

## 2021-09-05 ENCOUNTER — HOSPITAL ENCOUNTER (EMERGENCY)
Facility: HOSPITAL | Age: 86
Discharge: HOME | End: 2021-09-05
Attending: EMERGENCY MEDICINE
Payer: MEDICARE

## 2021-09-05 VITALS
OXYGEN SATURATION: 98 % | HEIGHT: 71 IN | WEIGHT: 235 LBS | SYSTOLIC BLOOD PRESSURE: 135 MMHG | DIASTOLIC BLOOD PRESSURE: 70 MMHG | BODY MASS INDEX: 32.9 KG/M2 | TEMPERATURE: 98 F | RESPIRATION RATE: 14 BRPM | HEART RATE: 70 BPM

## 2021-09-05 DIAGNOSIS — R33.9 URINARY RETENTION: Primary | ICD-10-CM

## 2021-09-05 LAB
ALBUMIN SERPL BCP-MCNC: 4 G/DL (ref 3.5–5.2)
ALP SERPL-CCNC: 50 U/L (ref 55–135)
ALT SERPL W/O P-5'-P-CCNC: 14 U/L (ref 10–44)
ANION GAP SERPL CALC-SCNC: 12 MMOL/L (ref 8–16)
AST SERPL-CCNC: 19 U/L (ref 10–40)
BASOPHILS # BLD AUTO: 0.05 K/UL (ref 0–0.2)
BASOPHILS NFR BLD: 0.9 % (ref 0–1.9)
BILIRUB SERPL-MCNC: 1.3 MG/DL (ref 0.1–1)
BILIRUB UR QL STRIP: NEGATIVE
BUN SERPL-MCNC: 45 MG/DL (ref 8–23)
CALCIUM SERPL-MCNC: 8.9 MG/DL (ref 8.7–10.5)
CHLORIDE SERPL-SCNC: 101 MMOL/L (ref 95–110)
CLARITY UR: CLEAR
CO2 SERPL-SCNC: 26 MMOL/L (ref 23–29)
COLOR UR: YELLOW
CREAT SERPL-MCNC: 2.6 MG/DL (ref 0.5–1.4)
DIFFERENTIAL METHOD: ABNORMAL
EOSINOPHIL # BLD AUTO: 0.2 K/UL (ref 0–0.5)
EOSINOPHIL NFR BLD: 2.7 % (ref 0–8)
ERYTHROCYTE [DISTWIDTH] IN BLOOD BY AUTOMATED COUNT: 13.5 % (ref 11.5–14.5)
EST. GFR  (AFRICAN AMERICAN): 25.1 ML/MIN/1.73 M^2
EST. GFR  (NON AFRICAN AMERICAN): 21.7 ML/MIN/1.73 M^2
GLUCOSE SERPL-MCNC: 144 MG/DL (ref 70–110)
GLUCOSE UR QL STRIP: NEGATIVE
HCT VFR BLD AUTO: 39.7 % (ref 40–54)
HGB BLD-MCNC: 12.8 G/DL (ref 14–18)
HGB UR QL STRIP: NEGATIVE
IMM GRANULOCYTES # BLD AUTO: 0.02 K/UL (ref 0–0.04)
IMM GRANULOCYTES NFR BLD AUTO: 0.3 % (ref 0–0.5)
KETONES UR QL STRIP: NEGATIVE
LEUKOCYTE ESTERASE UR QL STRIP: NEGATIVE
LYMPHOCYTES # BLD AUTO: 1.6 K/UL (ref 1–4.8)
LYMPHOCYTES NFR BLD: 28 % (ref 18–48)
MCH RBC QN AUTO: 27.8 PG (ref 27–31)
MCHC RBC AUTO-ENTMCNC: 32.2 G/DL (ref 32–36)
MCV RBC AUTO: 86 FL (ref 82–98)
MONOCYTES # BLD AUTO: 0.8 K/UL (ref 0.3–1)
MONOCYTES NFR BLD: 13.5 % (ref 4–15)
NEUTROPHILS # BLD AUTO: 3.2 K/UL (ref 1.8–7.7)
NEUTROPHILS NFR BLD: 54.6 % (ref 38–73)
NITRITE UR QL STRIP: NEGATIVE
NRBC BLD-RTO: 0 /100 WBC
PH UR STRIP: 5 [PH] (ref 5–8)
PLATELET # BLD AUTO: 149 K/UL (ref 150–450)
PMV BLD AUTO: 10.5 FL (ref 9.2–12.9)
POTASSIUM SERPL-SCNC: 3.9 MMOL/L (ref 3.5–5.1)
PROT SERPL-MCNC: 6.8 G/DL (ref 6–8.4)
PROT UR QL STRIP: NEGATIVE
RBC # BLD AUTO: 4.6 M/UL (ref 4.6–6.2)
SODIUM SERPL-SCNC: 139 MMOL/L (ref 136–145)
SP GR UR STRIP: 1.01 (ref 1–1.03)
URN SPEC COLLECT METH UR: NORMAL
UROBILINOGEN UR STRIP-ACNC: NEGATIVE EU/DL
WBC # BLD AUTO: 5.86 K/UL (ref 3.9–12.7)

## 2021-09-05 PROCEDURE — 81003 URINALYSIS AUTO W/O SCOPE: CPT | Performed by: EMERGENCY MEDICINE

## 2021-09-05 PROCEDURE — 80053 COMPREHEN METABOLIC PANEL: CPT | Performed by: EMERGENCY MEDICINE

## 2021-09-05 PROCEDURE — 51702 INSERT TEMP BLADDER CATH: CPT

## 2021-09-05 PROCEDURE — 99283 EMERGENCY DEPT VISIT LOW MDM: CPT

## 2021-09-05 PROCEDURE — 85025 COMPLETE CBC W/AUTO DIFF WBC: CPT | Performed by: EMERGENCY MEDICINE

## 2021-09-07 ENCOUNTER — TELEPHONE (OUTPATIENT)
Dept: FAMILY MEDICINE | Facility: CLINIC | Age: 86
End: 2021-09-07

## 2021-09-07 ENCOUNTER — TELEPHONE (OUTPATIENT)
Dept: UROLOGY | Facility: CLINIC | Age: 86
End: 2021-09-07

## 2021-09-08 ENCOUNTER — TELEPHONE (OUTPATIENT)
Dept: FAMILY MEDICINE | Facility: CLINIC | Age: 86
End: 2021-09-08

## 2021-09-15 DIAGNOSIS — G47.00 INSOMNIA, UNSPECIFIED TYPE: ICD-10-CM

## 2021-09-15 RX ORDER — ZOLPIDEM TARTRATE 10 MG/1
10 TABLET ORAL NIGHTLY PRN
Qty: 90 TABLET | Refills: 0 | Status: SHIPPED | OUTPATIENT
Start: 2021-09-15 | End: 2022-10-17 | Stop reason: SDUPTHER

## 2021-09-16 ENCOUNTER — TELEPHONE (OUTPATIENT)
Dept: FAMILY MEDICINE | Facility: CLINIC | Age: 86
End: 2021-09-16

## 2021-09-16 ENCOUNTER — OFFICE VISIT (OUTPATIENT)
Dept: UROLOGY | Facility: CLINIC | Age: 86
End: 2021-09-16
Payer: MEDICARE

## 2021-09-16 VITALS
BODY MASS INDEX: 32.9 KG/M2 | DIASTOLIC BLOOD PRESSURE: 72 MMHG | SYSTOLIC BLOOD PRESSURE: 137 MMHG | HEART RATE: 86 BPM | WEIGHT: 235 LBS | HEIGHT: 71 IN

## 2021-09-16 DIAGNOSIS — N40.0 BENIGN PROSTATIC HYPERPLASIA, UNSPECIFIED WHETHER LOWER URINARY TRACT SYMPTOMS PRESENT: ICD-10-CM

## 2021-09-16 DIAGNOSIS — Z01.818 PRE-OP TESTING: ICD-10-CM

## 2021-09-16 DIAGNOSIS — R33.9 URINE RETENTION: Primary | ICD-10-CM

## 2021-09-16 PROCEDURE — 99999 PR PBB SHADOW E&M-EST. PATIENT-LVL V: ICD-10-PCS | Mod: PBBFAC,,, | Performed by: UROLOGY

## 2021-09-16 PROCEDURE — 99204 PR OFFICE/OUTPT VISIT, NEW, LEVL IV, 45-59 MIN: ICD-10-PCS | Mod: S$PBB,CS,, | Performed by: UROLOGY

## 2021-09-16 PROCEDURE — 99215 OFFICE O/P EST HI 40 MIN: CPT | Mod: PBBFAC,PN | Performed by: UROLOGY

## 2021-09-16 PROCEDURE — 99204 OFFICE O/P NEW MOD 45 MIN: CPT | Mod: S$PBB,CS,, | Performed by: UROLOGY

## 2021-09-16 PROCEDURE — 99999 PR PBB SHADOW E&M-EST. PATIENT-LVL V: CPT | Mod: PBBFAC,,, | Performed by: UROLOGY

## 2021-09-16 RX ORDER — BENAZEPRIL HYDROCHLORIDE 20 MG/1
20 TABLET ORAL DAILY
COMMUNITY
Start: 2021-06-02 | End: 2022-08-31 | Stop reason: SDUPTHER

## 2021-09-16 RX ORDER — TRIAMCINOLONE ACETONIDE 1 MG/G
CREAM TOPICAL
COMMUNITY
Start: 2021-06-25

## 2021-09-16 RX ORDER — TAMSULOSIN HYDROCHLORIDE 0.4 MG/1
0.8 CAPSULE ORAL DAILY
Qty: 180 CAPSULE | Refills: 3 | Status: SHIPPED | OUTPATIENT
Start: 2021-09-16 | End: 2021-12-03

## 2021-09-16 RX ORDER — BENAZEPRIL HYDROCHLORIDE 10 MG/1
TABLET ORAL
Status: ON HOLD | COMMUNITY
Start: 2021-09-10 | End: 2021-09-29

## 2021-09-17 ENCOUNTER — CLINICAL SUPPORT (OUTPATIENT)
Dept: UROLOGY | Facility: CLINIC | Age: 86
End: 2021-09-17
Payer: MEDICARE

## 2021-09-17 ENCOUNTER — TELEPHONE (OUTPATIENT)
Dept: UROLOGY | Facility: CLINIC | Age: 86
End: 2021-09-17

## 2021-09-17 DIAGNOSIS — R33.9 URINE RETENTION: Primary | ICD-10-CM

## 2021-09-17 PROCEDURE — 51700 PR IRRIGATION, BLADDER: ICD-10-PCS | Mod: S$PBB,,, | Performed by: UROLOGY

## 2021-09-17 PROCEDURE — 51702 INSERT TEMP BLADDER CATH: CPT | Mod: PBBFAC,PN | Performed by: UROLOGY

## 2021-09-17 PROCEDURE — 99499 NO LOS: ICD-10-PCS | Mod: S$PBB,,, | Performed by: UROLOGY

## 2021-09-17 PROCEDURE — 51700 IRRIGATION OF BLADDER: CPT | Mod: PBBFAC,PN | Performed by: UROLOGY

## 2021-09-17 PROCEDURE — 99499 UNLISTED E&M SERVICE: CPT | Mod: S$PBB,,, | Performed by: UROLOGY

## 2021-09-17 PROCEDURE — 51700 IRRIGATION OF BLADDER: CPT | Mod: S$PBB,,, | Performed by: UROLOGY

## 2021-09-21 ENCOUNTER — CLINICAL SUPPORT (OUTPATIENT)
Dept: UROLOGY | Facility: CLINIC | Age: 86
End: 2021-09-21
Payer: MEDICARE

## 2021-09-21 DIAGNOSIS — R33.9 URINE RETENTION: Primary | ICD-10-CM

## 2021-09-21 LAB — POC RESIDUAL URINE VOLUME: 32 ML (ref 0–100)

## 2021-09-21 PROCEDURE — 51798 US URINE CAPACITY MEASURE: CPT | Mod: PBBFAC,PN

## 2021-09-21 PROCEDURE — 99499 NO LOS: ICD-10-PCS | Mod: S$PBB,,, | Performed by: UROLOGY

## 2021-09-21 PROCEDURE — 99499 UNLISTED E&M SERVICE: CPT | Mod: S$PBB,,, | Performed by: UROLOGY

## 2021-09-22 ENCOUNTER — HOSPITAL ENCOUNTER (EMERGENCY)
Facility: HOSPITAL | Age: 86
Discharge: HOME | End: 2021-09-23
Attending: EMERGENCY MEDICINE
Payer: MEDICARE

## 2021-09-22 VITALS
RESPIRATION RATE: 18 BRPM | HEIGHT: 71 IN | HEART RATE: 74 BPM | DIASTOLIC BLOOD PRESSURE: 75 MMHG | TEMPERATURE: 98 F | SYSTOLIC BLOOD PRESSURE: 149 MMHG | WEIGHT: 231 LBS | OXYGEN SATURATION: 97 % | BODY MASS INDEX: 32.34 KG/M2

## 2021-09-22 DIAGNOSIS — R33.8 ACUTE URINARY RETENTION: Primary | ICD-10-CM

## 2021-09-22 PROCEDURE — 99283 EMERGENCY DEPT VISIT LOW MDM: CPT | Mod: 25

## 2021-09-26 ENCOUNTER — LAB VISIT (OUTPATIENT)
Dept: PRIMARY CARE CLINIC | Facility: CLINIC | Age: 86
End: 2021-09-26
Payer: MEDICARE

## 2021-09-26 DIAGNOSIS — Z01.818 PRE-OP TESTING: ICD-10-CM

## 2021-09-26 PROCEDURE — U0003 INFECTIOUS AGENT DETECTION BY NUCLEIC ACID (DNA OR RNA); SEVERE ACUTE RESPIRATORY SYNDROME CORONAVIRUS 2 (SARS-COV-2) (CORONAVIRUS DISEASE [COVID-19]), AMPLIFIED PROBE TECHNIQUE, MAKING USE OF HIGH THROUGHPUT TECHNOLOGIES AS DESCRIBED BY CMS-2020-01-R: HCPCS | Performed by: UROLOGY

## 2021-09-26 PROCEDURE — U0005 INFEC AGEN DETEC AMPLI PROBE: HCPCS | Performed by: UROLOGY

## 2021-09-27 ENCOUNTER — TELEPHONE (OUTPATIENT)
Dept: UROLOGY | Facility: CLINIC | Age: 86
End: 2021-09-27

## 2021-09-27 LAB
SARS-COV-2 RNA RESP QL NAA+PROBE: NOT DETECTED
SARS-COV-2- CYCLE NUMBER: NORMAL

## 2021-09-28 ENCOUNTER — TELEPHONE (OUTPATIENT)
Dept: UROLOGY | Facility: CLINIC | Age: 86
End: 2021-09-28

## 2021-09-29 ENCOUNTER — HOSPITAL ENCOUNTER (OUTPATIENT)
Facility: AMBULARY SURGERY CENTER | Age: 86
Discharge: HOME | End: 2021-09-29
Attending: UROLOGY | Admitting: UROLOGY
Payer: MEDICARE

## 2021-09-29 DIAGNOSIS — R33.9 URINE RETENTION: Primary | ICD-10-CM

## 2021-09-29 PROCEDURE — 76872 PR US TRANSRECTAL: ICD-10-PCS | Mod: 26,,, | Performed by: UROLOGY

## 2021-09-29 PROCEDURE — 76872 US TRANSRECTAL: CPT | Mod: 26,,, | Performed by: UROLOGY

## 2021-09-29 PROCEDURE — 87088 URINE BACTERIA CULTURE: CPT | Performed by: UROLOGY

## 2021-09-29 PROCEDURE — 87086 URINE CULTURE/COLONY COUNT: CPT | Performed by: UROLOGY

## 2021-09-29 PROCEDURE — 52000 CYSTOURETHROSCOPY: CPT | Mod: ,,, | Performed by: UROLOGY

## 2021-09-29 PROCEDURE — 87077 CULTURE AEROBIC IDENTIFY: CPT | Performed by: UROLOGY

## 2021-09-29 PROCEDURE — 87186 SC STD MICRODIL/AGAR DIL: CPT | Performed by: UROLOGY

## 2021-09-29 PROCEDURE — 76872 US TRANSRECTAL: CPT | Performed by: UROLOGY

## 2021-09-29 PROCEDURE — 52000 PR CYSTOURETHROSCOPY: ICD-10-PCS | Mod: ,,, | Performed by: UROLOGY

## 2021-09-29 PROCEDURE — 52000 CYSTOURETHROSCOPY: CPT | Performed by: UROLOGY

## 2021-09-29 RX ORDER — LIDOCAINE HYDROCHLORIDE 20 MG/ML
JELLY TOPICAL
Status: DISCONTINUED
Start: 2021-09-29 | End: 2021-09-29 | Stop reason: HOSPADM

## 2021-09-29 RX ORDER — LIDOCAINE HYDROCHLORIDE 20 MG/ML
JELLY TOPICAL
Status: DISCONTINUED | OUTPATIENT
Start: 2021-09-29 | End: 2021-09-29 | Stop reason: HOSPADM

## 2021-09-29 RX ORDER — CEPHALEXIN 500 MG/1
500 CAPSULE ORAL EVERY 8 HOURS
Qty: 9 CAPSULE | Refills: 0 | Status: SHIPPED | OUTPATIENT
Start: 2021-09-29 | End: 2021-10-02

## 2021-09-29 RX ORDER — WATER 1 ML/ML
IRRIGANT IRRIGATION
Status: DISCONTINUED | OUTPATIENT
Start: 2021-09-29 | End: 2021-09-29 | Stop reason: HOSPADM

## 2021-10-01 VITALS
RESPIRATION RATE: 18 BRPM | HEIGHT: 71 IN | DIASTOLIC BLOOD PRESSURE: 83 MMHG | HEART RATE: 78 BPM | OXYGEN SATURATION: 97 % | WEIGHT: 235 LBS | BODY MASS INDEX: 32.9 KG/M2 | TEMPERATURE: 98 F | SYSTOLIC BLOOD PRESSURE: 162 MMHG

## 2021-10-04 LAB
BACTERIA UR CULT: ABNORMAL
BACTERIA UR CULT: ABNORMAL

## 2021-10-05 RX ORDER — NITROFURANTOIN 25; 75 MG/1; MG/1
100 CAPSULE ORAL 2 TIMES DAILY
Qty: 10 CAPSULE | Refills: 0 | Status: SHIPPED | OUTPATIENT
Start: 2021-10-05 | End: 2021-10-10

## 2021-10-14 ENCOUNTER — OFFICE VISIT (OUTPATIENT)
Dept: UROLOGY | Facility: CLINIC | Age: 86
End: 2021-10-14
Payer: MEDICARE

## 2021-10-14 VITALS
SYSTOLIC BLOOD PRESSURE: 110 MMHG | HEIGHT: 71 IN | HEART RATE: 83 BPM | DIASTOLIC BLOOD PRESSURE: 68 MMHG | BODY MASS INDEX: 32.22 KG/M2

## 2021-10-14 DIAGNOSIS — Z01.818 PRE-OP TESTING: ICD-10-CM

## 2021-10-14 DIAGNOSIS — R33.9 URINE RETENTION: Primary | ICD-10-CM

## 2021-10-14 PROCEDURE — 99999 PR PBB SHADOW E&M-EST. PATIENT-LVL V: ICD-10-PCS | Mod: PBBFAC,,, | Performed by: UROLOGY

## 2021-10-14 PROCEDURE — 99999 PR PBB SHADOW E&M-EST. PATIENT-LVL V: CPT | Mod: PBBFAC,,, | Performed by: UROLOGY

## 2021-10-14 PROCEDURE — 87086 URINE CULTURE/COLONY COUNT: CPT | Performed by: UROLOGY

## 2021-10-14 PROCEDURE — 99214 PR OFFICE/OUTPT VISIT, EST, LEVL IV, 30-39 MIN: ICD-10-PCS | Mod: S$PBB,,, | Performed by: UROLOGY

## 2021-10-14 PROCEDURE — 99214 OFFICE O/P EST MOD 30 MIN: CPT | Mod: S$PBB,,, | Performed by: UROLOGY

## 2021-10-14 PROCEDURE — 99215 OFFICE O/P EST HI 40 MIN: CPT | Mod: PBBFAC,PN | Performed by: UROLOGY

## 2021-10-15 LAB
BACTERIA UR CULT: NORMAL
BACTERIA UR CULT: NORMAL

## 2021-10-19 RX ORDER — NITROFURANTOIN 25; 75 MG/1; MG/1
100 CAPSULE ORAL 2 TIMES DAILY
Qty: 10 CAPSULE | Refills: 0 | Status: SHIPPED | OUTPATIENT
Start: 2021-10-19 | End: 2021-10-24

## 2021-10-20 ENCOUNTER — OFFICE VISIT (OUTPATIENT)
Dept: FAMILY MEDICINE | Facility: CLINIC | Age: 86
End: 2021-10-20
Payer: MEDICARE

## 2021-10-20 VITALS
WEIGHT: 226 LBS | DIASTOLIC BLOOD PRESSURE: 70 MMHG | HEART RATE: 80 BPM | BODY MASS INDEX: 31.64 KG/M2 | SYSTOLIC BLOOD PRESSURE: 128 MMHG | HEIGHT: 71 IN

## 2021-10-20 DIAGNOSIS — I10 HYPERTENSION, UNSPECIFIED TYPE: ICD-10-CM

## 2021-10-20 DIAGNOSIS — I48.0 PAROXYSMAL ATRIAL FIBRILLATION: ICD-10-CM

## 2021-10-20 DIAGNOSIS — M15.9 GENERALIZED OA: ICD-10-CM

## 2021-10-20 DIAGNOSIS — E03.9 HYPOTHYROIDISM, UNSPECIFIED TYPE: ICD-10-CM

## 2021-10-20 DIAGNOSIS — E78.5 HYPERLIPIDEMIA, UNSPECIFIED HYPERLIPIDEMIA TYPE: ICD-10-CM

## 2021-10-20 DIAGNOSIS — N18.31 STAGE 3A CHRONIC KIDNEY DISEASE: ICD-10-CM

## 2021-10-20 LAB — HBA1C MFR BLD: 8 %

## 2021-10-20 PROCEDURE — 99214 PR OFFICE/OUTPT VISIT, EST, LEVL IV, 30-39 MIN: ICD-10-PCS | Mod: S$GLB,,, | Performed by: NURSE PRACTITIONER

## 2021-10-20 PROCEDURE — 83036 HEMOGLOBIN GLYCOSYLATED A1C: CPT | Mod: QW,,, | Performed by: NURSE PRACTITIONER

## 2021-10-20 PROCEDURE — 99214 OFFICE O/P EST MOD 30 MIN: CPT | Mod: S$GLB,,, | Performed by: NURSE PRACTITIONER

## 2021-10-20 PROCEDURE — 83036 POCT HEMOGLOBIN A1C: ICD-10-PCS | Mod: QW,,, | Performed by: NURSE PRACTITIONER

## 2021-10-23 ENCOUNTER — LAB VISIT (OUTPATIENT)
Dept: PRIMARY CARE CLINIC | Facility: CLINIC | Age: 86
End: 2021-10-23
Payer: MEDICARE

## 2021-10-23 DIAGNOSIS — Z01.818 PRE-OP TESTING: ICD-10-CM

## 2021-10-23 PROCEDURE — U0003 INFECTIOUS AGENT DETECTION BY NUCLEIC ACID (DNA OR RNA); SEVERE ACUTE RESPIRATORY SYNDROME CORONAVIRUS 2 (SARS-COV-2) (CORONAVIRUS DISEASE [COVID-19]), AMPLIFIED PROBE TECHNIQUE, MAKING USE OF HIGH THROUGHPUT TECHNOLOGIES AS DESCRIBED BY CMS-2020-01-R: HCPCS | Performed by: NURSE PRACTITIONER

## 2021-10-23 PROCEDURE — U0005 INFEC AGEN DETEC AMPLI PROBE: HCPCS | Performed by: NURSE PRACTITIONER

## 2021-10-24 LAB
SARS-COV-2 RNA RESP QL NAA+PROBE: NOT DETECTED
SARS-COV-2- CYCLE NUMBER: NORMAL

## 2021-10-25 ENCOUNTER — ANESTHESIA EVENT (OUTPATIENT)
Dept: SURGERY | Facility: HOSPITAL | Age: 86
End: 2021-10-25
Payer: MEDICARE

## 2021-10-26 ENCOUNTER — ANESTHESIA (OUTPATIENT)
Dept: SURGERY | Facility: HOSPITAL | Age: 86
End: 2021-10-26
Payer: MEDICARE

## 2021-10-26 ENCOUNTER — HOSPITAL ENCOUNTER (OUTPATIENT)
Facility: HOSPITAL | Age: 86
Discharge: HOME | End: 2021-10-27
Attending: UROLOGY | Admitting: INTERNAL MEDICINE
Payer: MEDICARE

## 2021-10-26 DIAGNOSIS — E66.9 OBESITY, UNSPECIFIED CLASSIFICATION, UNSPECIFIED OBESITY TYPE, UNSPECIFIED WHETHER SERIOUS COMORBIDITY PRESENT: ICD-10-CM

## 2021-10-26 DIAGNOSIS — R33.9 URINE RETENTION: Primary | ICD-10-CM

## 2021-10-26 LAB
ANION GAP SERPL CALC-SCNC: 12 MMOL/L (ref 8–16)
APTT BLDCRRT: 27.6 SEC (ref 21–32)
BASOPHILS # BLD AUTO: 0.07 K/UL (ref 0–0.2)
BASOPHILS NFR BLD: 0.8 % (ref 0–1.9)
BUN SERPL-MCNC: 26 MG/DL (ref 8–23)
CALCIUM SERPL-MCNC: 9.5 MG/DL (ref 8.7–10.5)
CHLORIDE SERPL-SCNC: 104 MMOL/L (ref 95–110)
CO2 SERPL-SCNC: 24 MMOL/L (ref 23–29)
CREAT SERPL-MCNC: 1.5 MG/DL (ref 0.5–1.4)
DIFFERENTIAL METHOD: ABNORMAL
EOSINOPHIL # BLD AUTO: 0.7 K/UL (ref 0–0.5)
EOSINOPHIL NFR BLD: 7.6 % (ref 0–8)
ERYTHROCYTE [DISTWIDTH] IN BLOOD BY AUTOMATED COUNT: 13.5 % (ref 11.5–14.5)
EST. GFR  (AFRICAN AMERICAN): 49 ML/MIN/1.73 M^2
EST. GFR  (NON AFRICAN AMERICAN): 42 ML/MIN/1.73 M^2
GLUCOSE SERPL-MCNC: 147 MG/DL (ref 70–110)
HCT VFR BLD AUTO: 43.5 % (ref 40–54)
HGB BLD-MCNC: 13.6 G/DL (ref 14–18)
IMM GRANULOCYTES # BLD AUTO: 0.05 K/UL (ref 0–0.04)
IMM GRANULOCYTES NFR BLD AUTO: 0.6 % (ref 0–0.5)
INR PPP: 1.2 (ref 0.8–1.2)
LYMPHOCYTES # BLD AUTO: 2.4 K/UL (ref 1–4.8)
LYMPHOCYTES NFR BLD: 27.9 % (ref 18–48)
MCH RBC QN AUTO: 27.7 PG (ref 27–31)
MCHC RBC AUTO-ENTMCNC: 31.3 G/DL (ref 32–36)
MCV RBC AUTO: 89 FL (ref 82–98)
MONOCYTES # BLD AUTO: 1 K/UL (ref 0.3–1)
MONOCYTES NFR BLD: 11.9 % (ref 4–15)
NEUTROPHILS # BLD AUTO: 4.5 K/UL (ref 1.8–7.7)
NEUTROPHILS NFR BLD: 51.2 % (ref 38–73)
NRBC BLD-RTO: 0 /100 WBC
PLATELET # BLD AUTO: 193 K/UL (ref 150–450)
PMV BLD AUTO: 9.5 FL (ref 9.2–12.9)
POCT GLUCOSE: 150 MG/DL (ref 70–110)
POTASSIUM SERPL-SCNC: 4 MMOL/L (ref 3.5–5.1)
PROTHROMBIN TIME: 12.3 SEC (ref 9–12.5)
RBC # BLD AUTO: 4.91 M/UL (ref 4.6–6.2)
SODIUM SERPL-SCNC: 140 MMOL/L (ref 136–145)
WBC # BLD AUTO: 8.76 K/UL (ref 3.9–12.7)

## 2021-10-26 PROCEDURE — 71000039 HC RECOVERY, EACH ADD'L HOUR: Performed by: UROLOGY

## 2021-10-26 PROCEDURE — 25000003 PHARM REV CODE 250: Performed by: UROLOGY

## 2021-10-26 PROCEDURE — 37000008 HC ANESTHESIA 1ST 15 MINUTES: Performed by: UROLOGY

## 2021-10-26 PROCEDURE — 37000009 HC ANESTHESIA EA ADD 15 MINS: Performed by: UROLOGY

## 2021-10-26 PROCEDURE — 85025 COMPLETE CBC W/AUTO DIFF WBC: CPT | Performed by: UROLOGY

## 2021-10-26 PROCEDURE — 88342 IMHCHEM/IMCYTCHM 1ST ANTB: CPT | Mod: 26,,, | Performed by: PATHOLOGY

## 2021-10-26 PROCEDURE — 99900104 DSU ONLY-NO CHARGE-EA ADD'L HR (STAT): Performed by: UROLOGY

## 2021-10-26 PROCEDURE — 88341 PR IHC OR ICC EACH ADD'L SINGLE ANTIBODY  STAINPR: ICD-10-PCS | Mod: 26,,, | Performed by: PATHOLOGY

## 2021-10-26 PROCEDURE — 36000707: Performed by: UROLOGY

## 2021-10-26 PROCEDURE — 52601 PR TRANSURETHRAL ELEC-SURG PROSTATECTOM: ICD-10-PCS | Mod: ,,, | Performed by: UROLOGY

## 2021-10-26 PROCEDURE — D9220A PRA ANESTHESIA: ICD-10-PCS | Mod: ANES,,, | Performed by: ANESTHESIOLOGY

## 2021-10-26 PROCEDURE — 25000003 PHARM REV CODE 250: Performed by: ANESTHESIOLOGY

## 2021-10-26 PROCEDURE — G0416 PROSTATE BIOPSY, ANY MTHD: HCPCS | Mod: 26,,, | Performed by: PATHOLOGY

## 2021-10-26 PROCEDURE — D9220A PRA ANESTHESIA: ICD-10-PCS | Mod: CRNA,,, | Performed by: NURSE ANESTHETIST, CERTIFIED REGISTERED

## 2021-10-26 PROCEDURE — 63600175 PHARM REV CODE 636 W HCPCS: Performed by: UROLOGY

## 2021-10-26 PROCEDURE — 85730 THROMBOPLASTIN TIME PARTIAL: CPT | Performed by: UROLOGY

## 2021-10-26 PROCEDURE — D9220A PRA ANESTHESIA: Mod: ANES,,, | Performed by: ANESTHESIOLOGY

## 2021-10-26 PROCEDURE — 63600175 PHARM REV CODE 636 W HCPCS: Performed by: NURSE ANESTHETIST, CERTIFIED REGISTERED

## 2021-10-26 PROCEDURE — 85610 PROTHROMBIN TIME: CPT | Performed by: UROLOGY

## 2021-10-26 PROCEDURE — 94799 UNLISTED PULMONARY SVC/PX: CPT

## 2021-10-26 PROCEDURE — D9220A PRA ANESTHESIA: Mod: CRNA,,, | Performed by: NURSE ANESTHETIST, CERTIFIED REGISTERED

## 2021-10-26 PROCEDURE — G0416 PROSTATE BIOPSY, ANY MTHD: ICD-10-PCS | Mod: 26,,, | Performed by: PATHOLOGY

## 2021-10-26 PROCEDURE — 80048 BASIC METABOLIC PNL TOTAL CA: CPT | Performed by: UROLOGY

## 2021-10-26 PROCEDURE — 25000003 PHARM REV CODE 250: Performed by: NURSE ANESTHETIST, CERTIFIED REGISTERED

## 2021-10-26 PROCEDURE — 25000003 PHARM REV CODE 250: Performed by: INTERNAL MEDICINE

## 2021-10-26 PROCEDURE — 27201423 OPTIME MED/SURG SUP & DEVICES STERILE SUPPLY: Performed by: UROLOGY

## 2021-10-26 PROCEDURE — 88305 TISSUE EXAM BY PATHOLOGIST: CPT | Performed by: PATHOLOGY

## 2021-10-26 PROCEDURE — 36415 COLL VENOUS BLD VENIPUNCTURE: CPT | Performed by: UROLOGY

## 2021-10-26 PROCEDURE — 88341 IMHCHEM/IMCYTCHM EA ADD ANTB: CPT | Mod: 26,,, | Performed by: PATHOLOGY

## 2021-10-26 PROCEDURE — 88342 IMHCHEM/IMCYTCHM 1ST ANTB: CPT | Performed by: PATHOLOGY

## 2021-10-26 PROCEDURE — 71000033 HC RECOVERY, INTIAL HOUR: Performed by: UROLOGY

## 2021-10-26 PROCEDURE — 88341 IMHCHEM/IMCYTCHM EA ADD ANTB: CPT | Mod: 59 | Performed by: PATHOLOGY

## 2021-10-26 PROCEDURE — 36000706: Performed by: UROLOGY

## 2021-10-26 PROCEDURE — 63600175 PHARM REV CODE 636 W HCPCS: Performed by: INTERNAL MEDICINE

## 2021-10-26 PROCEDURE — 94761 N-INVAS EAR/PLS OXIMETRY MLT: CPT

## 2021-10-26 PROCEDURE — 27200651 HC AIRWAY, LMA: Performed by: ANESTHESIOLOGY

## 2021-10-26 PROCEDURE — 99900103 DSU ONLY-NO CHARGE-INITIAL HR (STAT): Performed by: UROLOGY

## 2021-10-26 PROCEDURE — 52601 PROSTATECTOMY (TURP): CPT | Mod: ,,, | Performed by: UROLOGY

## 2021-10-26 PROCEDURE — 88342 CHG IMMUNOCYTOCHEMISTRY: ICD-10-PCS | Mod: 26,,, | Performed by: PATHOLOGY

## 2021-10-26 RX ORDER — PROPOFOL 10 MG/ML
VIAL (ML) INTRAVENOUS
Status: DISCONTINUED | OUTPATIENT
Start: 2021-10-26 | End: 2021-10-26

## 2021-10-26 RX ORDER — DEXAMETHASONE SODIUM PHOSPHATE 4 MG/ML
INJECTION, SOLUTION INTRA-ARTICULAR; INTRALESIONAL; INTRAMUSCULAR; INTRAVENOUS; SOFT TISSUE
Status: DISCONTINUED | OUTPATIENT
Start: 2021-10-26 | End: 2021-10-26

## 2021-10-26 RX ORDER — ACETAMINOPHEN 325 MG/1
650 TABLET ORAL EVERY 4 HOURS PRN
Status: DISCONTINUED | OUTPATIENT
Start: 2021-10-26 | End: 2021-10-27 | Stop reason: HOSPADM

## 2021-10-26 RX ORDER — TAMSULOSIN HYDROCHLORIDE 0.4 MG/1
0.8 CAPSULE ORAL DAILY
Status: DISCONTINUED | OUTPATIENT
Start: 2021-10-27 | End: 2021-10-27 | Stop reason: HOSPADM

## 2021-10-26 RX ORDER — HYDROCODONE BITARTRATE AND ACETAMINOPHEN 5; 325 MG/1; MG/1
1 TABLET ORAL EVERY 4 HOURS PRN
Status: DISCONTINUED | OUTPATIENT
Start: 2021-10-26 | End: 2021-10-27 | Stop reason: HOSPADM

## 2021-10-26 RX ORDER — LIDOCAINE HCL/PF 100 MG/5ML
SYRINGE (ML) INTRAVENOUS
Status: DISCONTINUED | OUTPATIENT
Start: 2021-10-26 | End: 2021-10-26

## 2021-10-26 RX ORDER — LIDOCAINE HYDROCHLORIDE 10 MG/ML
1 INJECTION, SOLUTION EPIDURAL; INFILTRATION; INTRACAUDAL; PERINEURAL ONCE
Status: DISCONTINUED | OUTPATIENT
Start: 2021-10-26 | End: 2021-10-26 | Stop reason: HOSPADM

## 2021-10-26 RX ORDER — OXYCODONE HYDROCHLORIDE 5 MG/1
5 TABLET ORAL ONCE AS NEEDED
Status: COMPLETED | OUTPATIENT
Start: 2021-10-26 | End: 2021-10-26

## 2021-10-26 RX ORDER — ATROPA BELLADONNA AND OPIUM 16.2; 3 MG/1; MG/1
30 SUPPOSITORY RECTAL DAILY PRN
Status: DISCONTINUED | OUTPATIENT
Start: 2021-10-26 | End: 2021-10-27 | Stop reason: HOSPADM

## 2021-10-26 RX ORDER — ONDANSETRON 2 MG/ML
4 INJECTION INTRAMUSCULAR; INTRAVENOUS ONCE AS NEEDED
Status: DISCONTINUED | OUTPATIENT
Start: 2021-10-26 | End: 2021-10-26 | Stop reason: HOSPADM

## 2021-10-26 RX ORDER — ZOLPIDEM TARTRATE 5 MG/1
10 TABLET ORAL NIGHTLY PRN
Status: DISCONTINUED | OUTPATIENT
Start: 2021-10-26 | End: 2021-10-27 | Stop reason: HOSPADM

## 2021-10-26 RX ORDER — FENTANYL CITRATE 50 UG/ML
INJECTION, SOLUTION INTRAMUSCULAR; INTRAVENOUS
Status: DISCONTINUED | OUTPATIENT
Start: 2021-10-26 | End: 2021-10-26

## 2021-10-26 RX ORDER — ONDANSETRON 2 MG/ML
4 INJECTION INTRAMUSCULAR; INTRAVENOUS EVERY 12 HOURS PRN
Status: DISCONTINUED | OUTPATIENT
Start: 2021-10-26 | End: 2021-10-27 | Stop reason: HOSPADM

## 2021-10-26 RX ORDER — SODIUM CHLORIDE, SODIUM LACTATE, POTASSIUM CHLORIDE, CALCIUM CHLORIDE 600; 310; 30; 20 MG/100ML; MG/100ML; MG/100ML; MG/100ML
INJECTION, SOLUTION INTRAVENOUS CONTINUOUS
Status: DISCONTINUED | OUTPATIENT
Start: 2021-10-26 | End: 2021-10-27 | Stop reason: HOSPADM

## 2021-10-26 RX ORDER — FENTANYL CITRATE 50 UG/ML
25 INJECTION, SOLUTION INTRAMUSCULAR; INTRAVENOUS EVERY 5 MIN PRN
Status: DISCONTINUED | OUTPATIENT
Start: 2021-10-26 | End: 2021-10-26 | Stop reason: HOSPADM

## 2021-10-26 RX ORDER — ONDANSETRON HYDROCHLORIDE 2 MG/ML
INJECTION, SOLUTION INTRAMUSCULAR; INTRAVENOUS
Status: DISCONTINUED | OUTPATIENT
Start: 2021-10-26 | End: 2021-10-26

## 2021-10-26 RX ORDER — LISINOPRIL 2.5 MG/1
10 TABLET ORAL DAILY
Status: DISCONTINUED | OUTPATIENT
Start: 2021-10-26 | End: 2021-10-27 | Stop reason: HOSPADM

## 2021-10-26 RX ADMIN — LIDOCAINE HYDROCHLORIDE 100 MG: 20 INJECTION INTRAVENOUS at 09:10

## 2021-10-26 RX ADMIN — AMPICILLIN SODIUM 1 G: 1 INJECTION, POWDER, FOR SOLUTION INTRAMUSCULAR; INTRAVENOUS at 09:10

## 2021-10-26 RX ADMIN — FENTANYL CITRATE 50 MCG: 50 INJECTION, SOLUTION INTRAMUSCULAR; INTRAVENOUS at 09:10

## 2021-10-26 RX ADMIN — OXYCODONE 5 MG: 5 TABLET ORAL at 10:10

## 2021-10-26 RX ADMIN — SODIUM CHLORIDE, SODIUM GLUCONATE, SODIUM ACETATE, POTASSIUM CHLORIDE, MAGNESIUM CHLORIDE, SODIUM PHOSPHATE, DIBASIC, AND POTASSIUM PHOSPHATE: .53; .5; .37; .037; .03; .012; .00082 INJECTION, SOLUTION INTRAVENOUS at 07:10

## 2021-10-26 RX ADMIN — ZOLPIDEM TARTRATE 10 MG: 5 TABLET, COATED ORAL at 08:10

## 2021-10-26 RX ADMIN — DEXAMETHASONE SODIUM PHOSPHATE 4 MG: 4 INJECTION, SOLUTION INTRA-ARTICULAR; INTRALESIONAL; INTRAMUSCULAR; INTRAVENOUS; SOFT TISSUE at 09:10

## 2021-10-26 RX ADMIN — ONDANSETRON 4 MG: 2 INJECTION, SOLUTION INTRAMUSCULAR; INTRAVENOUS at 09:10

## 2021-10-26 RX ADMIN — SODIUM CHLORIDE, SODIUM LACTATE, POTASSIUM CHLORIDE, AND CALCIUM CHLORIDE: .6; .31; .03; .02 INJECTION, SOLUTION INTRAVENOUS at 01:10

## 2021-10-26 RX ADMIN — LISINOPRIL 10 MG: 2.5 TABLET ORAL at 05:10

## 2021-10-26 RX ADMIN — PROPOFOL 150 MG: 10 INJECTION, EMULSION INTRAVENOUS at 09:10

## 2021-10-26 RX ADMIN — OXYCODONE 5 MG: 5 TABLET ORAL at 02:10

## 2021-10-26 RX ADMIN — HYDROCODONE BITARTRATE AND ACETAMINOPHEN 1 TABLET: 5; 325 TABLET ORAL at 08:10

## 2021-10-26 RX ADMIN — AMPICILLIN SODIUM 2 G: 2 INJECTION, POWDER, FOR SOLUTION INTRAMUSCULAR; INTRAVENOUS at 07:10

## 2021-10-26 RX ADMIN — SODIUM CHLORIDE, SODIUM GLUCONATE, SODIUM ACETATE, POTASSIUM CHLORIDE, MAGNESIUM CHLORIDE, SODIUM PHOSPHATE, DIBASIC, AND POTASSIUM PHOSPHATE: .53; .5; .37; .037; .03; .012; .00082 INJECTION, SOLUTION INTRAVENOUS at 11:10

## 2021-10-27 VITALS
SYSTOLIC BLOOD PRESSURE: 150 MMHG | TEMPERATURE: 99 F | WEIGHT: 220 LBS | HEART RATE: 66 BPM | DIASTOLIC BLOOD PRESSURE: 69 MMHG | OXYGEN SATURATION: 94 % | BODY MASS INDEX: 30.8 KG/M2 | RESPIRATION RATE: 16 BRPM | HEIGHT: 71 IN

## 2021-10-27 LAB
ANION GAP SERPL CALC-SCNC: 10 MMOL/L (ref 8–16)
BASOPHILS # BLD AUTO: 0.05 K/UL (ref 0–0.2)
BASOPHILS NFR BLD: 0.4 % (ref 0–1.9)
BUN SERPL-MCNC: 26 MG/DL (ref 8–23)
CALCIUM SERPL-MCNC: 8.8 MG/DL (ref 8.7–10.5)
CHLORIDE SERPL-SCNC: 106 MMOL/L (ref 95–110)
CO2 SERPL-SCNC: 21 MMOL/L (ref 23–29)
CREAT SERPL-MCNC: 1.3 MG/DL (ref 0.5–1.4)
DIFFERENTIAL METHOD: ABNORMAL
EOSINOPHIL # BLD AUTO: 0 K/UL (ref 0–0.5)
EOSINOPHIL NFR BLD: 0.3 % (ref 0–8)
ERYTHROCYTE [DISTWIDTH] IN BLOOD BY AUTOMATED COUNT: 13.5 % (ref 11.5–14.5)
EST. GFR  (AFRICAN AMERICAN): 58 ML/MIN/1.73 M^2
EST. GFR  (NON AFRICAN AMERICAN): 50 ML/MIN/1.73 M^2
GLUCOSE SERPL-MCNC: 227 MG/DL (ref 70–110)
HCT VFR BLD AUTO: 40.5 % (ref 40–54)
HGB BLD-MCNC: 12.6 G/DL (ref 14–18)
IMM GRANULOCYTES # BLD AUTO: 0.08 K/UL (ref 0–0.04)
IMM GRANULOCYTES NFR BLD AUTO: 0.6 % (ref 0–0.5)
LYMPHOCYTES # BLD AUTO: 1.5 K/UL (ref 1–4.8)
LYMPHOCYTES NFR BLD: 12 % (ref 18–48)
MCH RBC QN AUTO: 27.5 PG (ref 27–31)
MCHC RBC AUTO-ENTMCNC: 31.1 G/DL (ref 32–36)
MCV RBC AUTO: 88 FL (ref 82–98)
MONOCYTES # BLD AUTO: 1.2 K/UL (ref 0.3–1)
MONOCYTES NFR BLD: 9.4 % (ref 4–15)
NEUTROPHILS # BLD AUTO: 9.9 K/UL (ref 1.8–7.7)
NEUTROPHILS NFR BLD: 77.3 % (ref 38–73)
NRBC BLD-RTO: 0 /100 WBC
PLATELET # BLD AUTO: 168 K/UL (ref 150–450)
PMV BLD AUTO: 10 FL (ref 9.2–12.9)
POTASSIUM SERPL-SCNC: 4.2 MMOL/L (ref 3.5–5.1)
RBC # BLD AUTO: 4.58 M/UL (ref 4.6–6.2)
SODIUM SERPL-SCNC: 137 MMOL/L (ref 136–145)
WBC # BLD AUTO: 12.83 K/UL (ref 3.9–12.7)

## 2021-10-27 PROCEDURE — 25000003 PHARM REV CODE 250: Performed by: INTERNAL MEDICINE

## 2021-10-27 PROCEDURE — 63600175 PHARM REV CODE 636 W HCPCS: Performed by: UROLOGY

## 2021-10-27 PROCEDURE — 94761 N-INVAS EAR/PLS OXIMETRY MLT: CPT

## 2021-10-27 PROCEDURE — 25000003 PHARM REV CODE 250: Performed by: UROLOGY

## 2021-10-27 PROCEDURE — 36415 COLL VENOUS BLD VENIPUNCTURE: CPT | Performed by: INTERNAL MEDICINE

## 2021-10-27 PROCEDURE — 85025 COMPLETE CBC W/AUTO DIFF WBC: CPT | Performed by: INTERNAL MEDICINE

## 2021-10-27 PROCEDURE — 63600175 PHARM REV CODE 636 W HCPCS: Performed by: INTERNAL MEDICINE

## 2021-10-27 PROCEDURE — 94799 UNLISTED PULMONARY SVC/PX: CPT

## 2021-10-27 PROCEDURE — 80048 BASIC METABOLIC PNL TOTAL CA: CPT | Performed by: INTERNAL MEDICINE

## 2021-10-27 RX ORDER — NITROFURANTOIN 25; 75 MG/1; MG/1
100 CAPSULE ORAL 2 TIMES DAILY
Qty: 6 CAPSULE | Refills: 0 | Status: SHIPPED | OUTPATIENT
Start: 2021-10-27 | End: 2021-10-30

## 2021-10-27 RX ADMIN — SODIUM CHLORIDE, SODIUM LACTATE, POTASSIUM CHLORIDE, AND CALCIUM CHLORIDE: .6; .31; .03; .02 INJECTION, SOLUTION INTRAVENOUS at 05:10

## 2021-10-27 RX ADMIN — TAMSULOSIN HYDROCHLORIDE 0.8 MG: 0.4 CAPSULE ORAL at 08:10

## 2021-10-27 RX ADMIN — LISINOPRIL 10 MG: 2.5 TABLET ORAL at 08:10

## 2021-10-27 RX ADMIN — HYDROCODONE BITARTRATE AND ACETAMINOPHEN 1 TABLET: 5; 325 TABLET ORAL at 06:10

## 2021-10-27 RX ADMIN — AMPICILLIN SODIUM 2 G: 2 INJECTION, POWDER, FOR SOLUTION INTRAMUSCULAR; INTRAVENOUS at 01:10

## 2021-10-28 ENCOUNTER — TELEPHONE (OUTPATIENT)
Dept: MEDSURG UNIT | Facility: HOSPITAL | Age: 86
End: 2021-10-28
Payer: MEDICARE

## 2021-10-29 ENCOUNTER — CLINICAL SUPPORT (OUTPATIENT)
Dept: UROLOGY | Facility: CLINIC | Age: 86
End: 2021-10-29
Payer: MEDICARE

## 2021-10-29 ENCOUNTER — TELEPHONE (OUTPATIENT)
Dept: UROLOGY | Facility: CLINIC | Age: 86
End: 2021-10-29

## 2021-10-29 PROCEDURE — 99499 UNLISTED E&M SERVICE: CPT | Mod: S$PBB,,, | Performed by: UROLOGY

## 2021-10-29 PROCEDURE — 99499 NO LOS: ICD-10-PCS | Mod: S$PBB,,, | Performed by: UROLOGY

## 2021-11-01 ENCOUNTER — TELEPHONE (OUTPATIENT)
Dept: FAMILY MEDICINE | Facility: CLINIC | Age: 86
End: 2021-11-01
Payer: MEDICARE

## 2021-11-01 ENCOUNTER — TELEPHONE (OUTPATIENT)
Dept: UROLOGY | Facility: CLINIC | Age: 86
End: 2021-11-01
Payer: MEDICARE

## 2021-11-01 LAB
FINAL PATHOLOGIC DIAGNOSIS: NORMAL
GROSS: NORMAL
Lab: NORMAL
MICROSCOPIC EXAM: NORMAL

## 2021-11-04 DIAGNOSIS — Z79.899 HIGH RISK MEDICATION USE: ICD-10-CM

## 2021-11-04 DIAGNOSIS — E78.5 HYPERLIPIDEMIA, UNSPECIFIED HYPERLIPIDEMIA TYPE: ICD-10-CM

## 2021-11-04 RX ORDER — LOVASTATIN 20 MG/1
20 TABLET ORAL NIGHTLY
Qty: 90 TABLET | Refills: 1 | Status: SHIPPED | OUTPATIENT
Start: 2021-11-04 | End: 2022-05-03 | Stop reason: SDUPTHER

## 2021-11-09 ENCOUNTER — TELEPHONE (OUTPATIENT)
Dept: UROLOGY | Facility: CLINIC | Age: 86
End: 2021-11-09
Payer: MEDICARE

## 2021-11-12 ENCOUNTER — TELEPHONE (OUTPATIENT)
Dept: FAMILY MEDICINE | Facility: CLINIC | Age: 86
End: 2021-11-12
Payer: MEDICARE

## 2021-11-16 ENCOUNTER — OFFICE VISIT (OUTPATIENT)
Dept: FAMILY MEDICINE | Facility: CLINIC | Age: 86
End: 2021-11-16
Payer: MEDICARE

## 2021-11-16 VITALS
HEIGHT: 71 IN | HEART RATE: 72 BPM | DIASTOLIC BLOOD PRESSURE: 80 MMHG | SYSTOLIC BLOOD PRESSURE: 130 MMHG | BODY MASS INDEX: 30.66 KG/M2 | WEIGHT: 219 LBS

## 2021-11-16 DIAGNOSIS — I48.0 PAROXYSMAL ATRIAL FIBRILLATION: ICD-10-CM

## 2021-11-16 DIAGNOSIS — E03.9 HYPOTHYROIDISM, UNSPECIFIED TYPE: ICD-10-CM

## 2021-11-16 DIAGNOSIS — I10 HYPERTENSION, UNSPECIFIED TYPE: ICD-10-CM

## 2021-11-16 DIAGNOSIS — M15.9 GENERALIZED OA: ICD-10-CM

## 2021-11-16 DIAGNOSIS — Z01.00 DIABETIC EYE EXAM: Primary | ICD-10-CM

## 2021-11-16 DIAGNOSIS — R60.9 EDEMA, UNSPECIFIED TYPE: ICD-10-CM

## 2021-11-16 DIAGNOSIS — N18.31 STAGE 3A CHRONIC KIDNEY DISEASE: ICD-10-CM

## 2021-11-16 DIAGNOSIS — Z90.79 S/P TURP: ICD-10-CM

## 2021-11-16 DIAGNOSIS — E78.5 HYPERLIPIDEMIA, UNSPECIFIED HYPERLIPIDEMIA TYPE: ICD-10-CM

## 2021-11-16 DIAGNOSIS — E11.9 DIABETIC EYE EXAM: Primary | ICD-10-CM

## 2021-11-16 DIAGNOSIS — N40.0 BENIGN PROSTATIC HYPERPLASIA, UNSPECIFIED WHETHER LOWER URINARY TRACT SYMPTOMS PRESENT: ICD-10-CM

## 2021-11-16 PROCEDURE — 99214 PR OFFICE/OUTPT VISIT, EST, LEVL IV, 30-39 MIN: ICD-10-PCS | Mod: S$GLB,,, | Performed by: NURSE PRACTITIONER

## 2021-11-16 PROCEDURE — 99214 OFFICE O/P EST MOD 30 MIN: CPT | Mod: S$GLB,,, | Performed by: NURSE PRACTITIONER

## 2021-11-29 ENCOUNTER — TELEPHONE (OUTPATIENT)
Dept: FAMILY MEDICINE | Facility: CLINIC | Age: 86
End: 2021-11-29
Payer: MEDICARE

## 2021-11-30 RX ORDER — METFORMIN HYDROCHLORIDE 500 MG/1
500 TABLET ORAL 2 TIMES DAILY WITH MEALS
Qty: 180 TABLET | Refills: 3 | Status: SHIPPED | OUTPATIENT
Start: 2021-11-30 | End: 2023-04-13

## 2021-12-03 ENCOUNTER — OFFICE VISIT (OUTPATIENT)
Dept: UROLOGY | Facility: CLINIC | Age: 86
End: 2021-12-03
Payer: MEDICARE

## 2021-12-03 VITALS — HEIGHT: 71 IN | BODY MASS INDEX: 30.65 KG/M2 | WEIGHT: 218.94 LBS

## 2021-12-03 DIAGNOSIS — C61 PROSTATE CANCER: ICD-10-CM

## 2021-12-03 DIAGNOSIS — R33.9 URINE RETENTION: Primary | ICD-10-CM

## 2021-12-03 PROCEDURE — 99999 PR PBB SHADOW E&M-EST. PATIENT-LVL II: CPT | Mod: PBBFAC,,, | Performed by: UROLOGY

## 2021-12-03 PROCEDURE — 99024 PR POST-OP FOLLOW-UP VISIT: ICD-10-PCS | Mod: POP,,, | Performed by: UROLOGY

## 2021-12-03 PROCEDURE — 99212 OFFICE O/P EST SF 10 MIN: CPT | Mod: PBBFAC,PN | Performed by: UROLOGY

## 2021-12-03 PROCEDURE — 99024 POSTOP FOLLOW-UP VISIT: CPT | Mod: POP,,, | Performed by: UROLOGY

## 2021-12-03 PROCEDURE — 99999 PR PBB SHADOW E&M-EST. PATIENT-LVL II: ICD-10-PCS | Mod: PBBFAC,,, | Performed by: UROLOGY

## 2022-03-23 DIAGNOSIS — R60.9 EDEMA, UNSPECIFIED TYPE: ICD-10-CM

## 2022-03-23 RX ORDER — FUROSEMIDE 40 MG/1
40 TABLET ORAL DAILY PRN
Qty: 30 TABLET | Refills: 3 | Status: SHIPPED | OUTPATIENT
Start: 2022-03-23 | End: 2023-11-20 | Stop reason: SDUPTHER

## 2022-03-23 NOTE — TELEPHONE ENCOUNTER
----- Message from Valerie Valerio sent at 3/23/2022 10:39 AM CDT -----  Pt calling [facility]ide [facility] [address]  [phone]

## 2022-05-03 DIAGNOSIS — Z79.899 HIGH RISK MEDICATION USE: ICD-10-CM

## 2022-05-03 DIAGNOSIS — E78.5 HYPERLIPIDEMIA, UNSPECIFIED HYPERLIPIDEMIA TYPE: ICD-10-CM

## 2022-05-03 RX ORDER — LOVASTATIN 20 MG/1
20 TABLET ORAL NIGHTLY
Qty: 90 TABLET | Refills: 1 | Status: SHIPPED | OUTPATIENT
Start: 2022-05-03 | End: 2022-08-31 | Stop reason: SDUPTHER

## 2022-05-03 NOTE — TELEPHONE ENCOUNTER
----- Message from Kita Alvarez MA sent at 5/3/2022  8:55 AM CDT -----  Regarding: rx refill  Patient needs refill on lovastatin 20 mg  [facility] [address]  Patient ph # [phone]

## 2022-05-19 ENCOUNTER — IMMUNIZATION (OUTPATIENT)
Dept: PRIMARY CARE CLINIC | Facility: CLINIC | Age: 87
End: 2022-05-19
Payer: MEDICARE

## 2022-05-19 DIAGNOSIS — Z23 NEED FOR VACCINATION: Primary | ICD-10-CM

## 2022-05-19 PROCEDURE — 0054A COVID-19, MRNA, LNP-S, PF, 30 MCG/0.3 ML DOSE VACCINE (PFIZER): ICD-10-PCS | Mod: S$GLB,,, | Performed by: FAMILY MEDICINE

## 2022-05-19 PROCEDURE — 91305 COVID-19, MRNA, LNP-S, PF, 30 MCG/0.3 ML DOSE VACCINE (PFIZER): ICD-10-PCS | Mod: S$GLB,,, | Performed by: FAMILY MEDICINE

## 2022-05-19 PROCEDURE — 0054A COVID-19, MRNA, LNP-S, PF, 30 MCG/0.3 ML DOSE VACCINE (PFIZER): CPT | Mod: S$GLB,,, | Performed by: FAMILY MEDICINE

## 2022-05-19 PROCEDURE — 91305 COVID-19, MRNA, LNP-S, PF, 30 MCG/0.3 ML DOSE VACCINE (PFIZER): CPT | Mod: S$GLB,,, | Performed by: FAMILY MEDICINE

## 2022-06-02 ENCOUNTER — LAB VISIT (OUTPATIENT)
Dept: LAB | Facility: HOSPITAL | Age: 87
End: 2022-06-02
Attending: SPECIALIST
Payer: MEDICARE

## 2022-06-02 DIAGNOSIS — Z79.01 LONG TERM (CURRENT) USE OF ANTICOAGULANTS: ICD-10-CM

## 2022-06-02 DIAGNOSIS — I10 ESSENTIAL HYPERTENSION, MALIGNANT: Primary | ICD-10-CM

## 2022-06-02 DIAGNOSIS — E11.9 DIABETES MELLITUS WITHOUT COMPLICATION: ICD-10-CM

## 2022-06-02 LAB
ALBUMIN SERPL BCP-MCNC: 2.9 G/DL (ref 3.5–5.2)
ALP SERPL-CCNC: 90 U/L (ref 55–135)
ALT SERPL W/O P-5'-P-CCNC: 17 U/L (ref 10–44)
ANION GAP SERPL CALC-SCNC: 11 MMOL/L (ref 8–16)
AST SERPL-CCNC: 18 U/L (ref 10–40)
BASOPHILS # BLD AUTO: 0.08 K/UL (ref 0–0.2)
BASOPHILS NFR BLD: 0.5 % (ref 0–1.9)
BILIRUB SERPL-MCNC: 0.8 MG/DL (ref 0.1–1)
BUN SERPL-MCNC: 30 MG/DL (ref 8–23)
CALCIUM SERPL-MCNC: 8.4 MG/DL (ref 8.7–10.5)
CHLORIDE SERPL-SCNC: 99 MMOL/L (ref 95–110)
CO2 SERPL-SCNC: 25 MMOL/L (ref 23–29)
CREAT SERPL-MCNC: 2 MG/DL (ref 0.5–1.4)
DIFFERENTIAL METHOD: ABNORMAL
EOSINOPHIL # BLD AUTO: 0.2 K/UL (ref 0–0.5)
EOSINOPHIL NFR BLD: 1 % (ref 0–8)
ERYTHROCYTE [DISTWIDTH] IN BLOOD BY AUTOMATED COUNT: 14.5 % (ref 11.5–14.5)
EST. GFR  (AFRICAN AMERICAN): 34.4 ML/MIN/1.73 M^2
EST. GFR  (NON AFRICAN AMERICAN): 29.8 ML/MIN/1.73 M^2
ESTIMATED AVG GLUCOSE: 352 MG/DL (ref 68–131)
GLUCOSE SERPL-MCNC: 252 MG/DL (ref 70–110)
HBA1C MFR BLD: 13.9 % (ref 4.5–6.2)
HCT VFR BLD AUTO: 38.5 % (ref 40–54)
HGB BLD-MCNC: 12.2 G/DL (ref 14–18)
IMM GRANULOCYTES # BLD AUTO: 0.2 K/UL (ref 0–0.04)
IMM GRANULOCYTES NFR BLD AUTO: 1.1 % (ref 0–0.5)
INR PPP: 8.9
LYMPHOCYTES # BLD AUTO: 1.5 K/UL (ref 1–4.8)
LYMPHOCYTES NFR BLD: 8.3 % (ref 18–48)
MAGNESIUM SERPL-MCNC: 1.8 MG/DL (ref 1.6–2.6)
MCH RBC QN AUTO: 26.3 PG (ref 27–31)
MCHC RBC AUTO-ENTMCNC: 31.7 G/DL (ref 32–36)
MCV RBC AUTO: 83 FL (ref 82–98)
MONOCYTES # BLD AUTO: 1.2 K/UL (ref 0.3–1)
MONOCYTES NFR BLD: 6.8 % (ref 4–15)
NEUTROPHILS # BLD AUTO: 14.3 K/UL (ref 1.8–7.7)
NEUTROPHILS NFR BLD: 82.3 % (ref 38–73)
NRBC BLD-RTO: 0 /100 WBC
PLATELET # BLD AUTO: 323 K/UL (ref 150–450)
PMV BLD AUTO: 9.9 FL (ref 9.2–12.9)
POTASSIUM SERPL-SCNC: 4 MMOL/L (ref 3.5–5.1)
PROT SERPL-MCNC: 7.6 G/DL (ref 6–8.4)
PROTHROMBIN TIME: 69.2 SEC (ref 11.4–13.7)
RBC # BLD AUTO: 4.63 M/UL (ref 4.6–6.2)
SODIUM SERPL-SCNC: 135 MMOL/L (ref 136–145)
WBC # BLD AUTO: 17.4 K/UL (ref 3.9–12.7)

## 2022-06-02 PROCEDURE — 83735 ASSAY OF MAGNESIUM: CPT | Performed by: SPECIALIST

## 2022-06-02 PROCEDURE — 36415 COLL VENOUS BLD VENIPUNCTURE: CPT | Performed by: SPECIALIST

## 2022-06-02 PROCEDURE — 83036 HEMOGLOBIN GLYCOSYLATED A1C: CPT | Performed by: SPECIALIST

## 2022-06-02 PROCEDURE — 85025 COMPLETE CBC W/AUTO DIFF WBC: CPT | Performed by: SPECIALIST

## 2022-06-02 PROCEDURE — 85610 PROTHROMBIN TIME: CPT | Performed by: SPECIALIST

## 2022-06-02 PROCEDURE — 80053 COMPREHEN METABOLIC PANEL: CPT | Performed by: SPECIALIST

## 2022-06-03 ENCOUNTER — OFFICE VISIT (OUTPATIENT)
Dept: UROLOGY | Facility: CLINIC | Age: 87
End: 2022-06-03
Payer: MEDICARE

## 2022-06-03 VITALS
BODY MASS INDEX: 31.22 KG/M2 | DIASTOLIC BLOOD PRESSURE: 78 MMHG | HEART RATE: 85 BPM | HEIGHT: 71 IN | SYSTOLIC BLOOD PRESSURE: 161 MMHG | WEIGHT: 223 LBS

## 2022-06-03 DIAGNOSIS — C61 PROSTATE CANCER: ICD-10-CM

## 2022-06-03 DIAGNOSIS — R33.9 URINE RETENTION: Primary | ICD-10-CM

## 2022-06-03 PROCEDURE — 87086 URINE CULTURE/COLONY COUNT: CPT | Performed by: UROLOGY

## 2022-06-03 PROCEDURE — 99213 OFFICE O/P EST LOW 20 MIN: CPT | Mod: S$PBB,,, | Performed by: UROLOGY

## 2022-06-03 PROCEDURE — 99999 PR PBB SHADOW E&M-EST. PATIENT-LVL IV: CPT | Mod: PBBFAC,,, | Performed by: UROLOGY

## 2022-06-03 PROCEDURE — 87186 SC STD MICRODIL/AGAR DIL: CPT | Performed by: UROLOGY

## 2022-06-03 PROCEDURE — 99214 OFFICE O/P EST MOD 30 MIN: CPT | Mod: PBBFAC,PN | Performed by: UROLOGY

## 2022-06-03 PROCEDURE — 87077 CULTURE AEROBIC IDENTIFY: CPT | Mod: 59 | Performed by: UROLOGY

## 2022-06-03 PROCEDURE — 87088 URINE BACTERIA CULTURE: CPT | Performed by: UROLOGY

## 2022-06-03 PROCEDURE — 99999 PR PBB SHADOW E&M-EST. PATIENT-LVL IV: ICD-10-PCS | Mod: PBBFAC,,, | Performed by: UROLOGY

## 2022-06-03 PROCEDURE — 99213 PR OFFICE/OUTPT VISIT, EST, LEVL III, 20-29 MIN: ICD-10-PCS | Mod: S$PBB,,, | Performed by: UROLOGY

## 2022-06-03 RX ORDER — CLOBETASOL PROPIONATE 0.46 MG/ML
SOLUTION TOPICAL
COMMUNITY
Start: 2022-04-29

## 2022-06-03 RX ORDER — SILVER SULFADIAZINE 10 G/1000G
CREAM TOPICAL
COMMUNITY
Start: 2022-04-29

## 2022-06-03 RX ORDER — BENAZEPRIL HYDROCHLORIDE 10 MG/1
TABLET ORAL
COMMUNITY
Start: 2022-05-31 | End: 2022-06-28

## 2022-06-03 NOTE — Clinical Note
Hi - just FYI - he got labs yesterday per his cardiologist. He had a leukocytosis and JUAN J. Believes he recently had Salmonella after eating some bad food. Told him to follow up with you. Thanks a lot.

## 2022-06-03 NOTE — PROGRESS NOTES
[facility] Established Patient/H&P:    Alex Hatch is a 86 y.o. male who presents for follow up for acute urinary retention and prostate cancer.     Patient with a history of atrial fibrillation on Coumadin, DM and HTN who has a history of acute urinary retention. He presented to the emergency department on 9/5/21 with an episode of retention. On review of records, he had a perez catheter placed with 350 cc urine obtained. UA negative.     He reports progressively worsening lower urinary tract symptoms with weak stream. He was started on Flomax 0.4 mg per his PCP. States it improved his symptoms somewhat, but he remained with a weak stream.        Interval History     10/14/21: He is s/p cystoscopy and TRUS on 9/29/21 which revealed a 30 gram prostate with mild obstruction. Moderate to severe trabeculations and several small diverticula. Inflammation from indwelling catheter on posterior bladder wall. Remains with a catheter in place.     12/3/21: He is s/p uncomplicated TURP on 10/26/21. Path with a small focus of [place] grade 3 prostate adenocarcinoma. States that he is voiding well. Has no significant lower urinary tract complaints.     6/3/22: Patient doing well from a urologic standpoint. IPSS 8. PVR 37 mL today. States he had a few episodes of gross hematuria last week that have since resolved. Evaluated by his PCP and found to have an INR of 9 on 6/2/22. He is currently holding his Coumadin and was told this was likely the cause.     Of note, he also believes he may have had Salmonella after eating recently. WBC 17 yesterday. States he had chills.      Denies any prior episodes of urinary retention, gross hematuria, flank pain,  trauma or history of  malignancy.         PSA  0.42                 1/15/14     A1C  13.9  6/2/22  9.1                   8/17/21    IPSS QoL  8 1 6/3/22     PVR  37 mL  6/3/22  30 mL              12/3/21    Past Medical History:   Diagnosis Date    Atrial  "fibrillation     Dr Lux    CKD (chronic kidney disease), stage III     Coronary artery disease     Diabetes mellitus, type 2     Hyperlipidemia     Hypertension        Past Surgical History:   Procedure Laterality Date    CARDIAC PACEMAKER PLACEMENT  5/2013    Dr Lux    COLONOSCOPY W/ POLYPECTOMY  2011    Dr Juanor, repeat in 3 years    CORONARY ARTERY BYPASS GRAFT  1993    CYSTOSCOPY N/A 9/29/2021    Procedure: CYSTOSCOPY;  Surgeon: Ramona Skaggs Jr., MD;  Location: On license of [facility] OR;  Service: Urology;  Laterality: N/A;    cassandra      TRANSRECTAL ULTRASOUND EXAMINATION N/A 9/29/2021    Procedure: ULTRASOUND, RECTAL APPROACH;  Surgeon: Ramona Skaggs Jr., MD;  Location: On license of [facility] OR;  Service: Urology;  Laterality: N/A;    TRANSURETHRAL RESECTION OF PROSTATE N/A 10/26/2021    Procedure: TURP (TRANSURETHRAL RESECTION OF PROSTATE);  Surgeon: Ramona Skaggs Jr., MD;  Location: [facility] OR;  Service: Urology;  Laterality: N/A;       Review of patient's allergies indicates:   Allergen Reactions    Iodinated contrast media      Betadine ok       Medications Reviewed: see MAR    FOCUSED PHYSICAL EXAM:    Vitals:    06/03/22 1112   BP: (!) 161/78   Pulse: 85     Body mass index is 31.1 kg/m². Weight: 101.2 kg (223 lb) Height: 5' 11" (180.3 cm)       General: Alert, cooperative, no distress, appears stated age  Abdomen: Soft, non-tender, no CVA tenderness, non-distended        Lab Results   Component Value Date    PSA 0.42 01/15/2014       LABS:    Recent Results (from the past 336 hour(s))   Comprehensive metabolic panel    Collection Time: 06/02/22  9:40 AM   Result Value Ref Range    Sodium 135 (L) 136 - 145 mmol/L    Potassium 4.0 3.5 - 5.1 mmol/L    Chloride 99 95 - 110 mmol/L    CO2 25 23 - 29 mmol/L    Glucose 252 (H) 70 - 110 mg/dL    BUN 30 (H) 8 - 23 mg/dL    Creatinine 2.0 (H) 0.5 - 1.4 mg/dL    Calcium 8.4 (L) 8.7 - 10.5 mg/dL    Total Protein 7.6 6.0 - 8.4 g/dL    Albumin 2.9 (L) 3.5 - 5.2 g/dL    Total " Bilirubin 0.8 0.1 - 1.0 mg/dL    Alkaline Phosphatase 90 55 - 135 U/L    AST 18 10 - 40 U/L    ALT 17 10 - 44 U/L    Anion Gap 11 8 - 16 mmol/L    eGFR if African American 34.4 (A) >60 mL/min/1.73 m^2    eGFR if non  29.8 (A) >60 mL/min/1.73 m^2   CBC auto differential    Collection Time: 06/02/22  9:40 AM   Result Value Ref Range    WBC 17.40 (H) 3.90 - 12.70 K/uL    RBC 4.63 4.60 - 6.20 M/uL    Hemoglobin 12.2 (L) 14.0 - 18.0 g/dL    Hematocrit 38.5 (L) 40.0 - 54.0 %    MCV 83 82 - 98 fL    MCH 26.3 (L) 27.0 - 31.0 pg    MCHC 31.7 (L) 32.0 - 36.0 g/dL    RDW 14.5 11.5 - 14.5 %    Platelets 323 150 - 450 K/uL    MPV 9.9 9.2 - 12.9 fL    Immature Granulocytes 1.1 (H) 0.0 - 0.5 %    Gran # (ANC) 14.3 (H) 1.8 - 7.7 K/uL    Immature Grans (Abs) 0.20 (H) 0.00 - 0.04 K/uL    Lymph # 1.5 1.0 - 4.8 K/uL    Mono # 1.2 (H) 0.3 - 1.0 K/uL    Eos # 0.2 0.0 - 0.5 K/uL    Baso # 0.08 0.00 - 0.20 K/uL    nRBC 0 0 /100 WBC    Gran % 82.3 (H) 38.0 - 73.0 %    Lymph % 8.3 (L) 18.0 - 48.0 %    Mono % 6.8 4.0 - 15.0 %    Eosinophil % 1.0 0.0 - 8.0 %    Basophil % 0.5 0.0 - 1.9 %    Differential Method Automated    Magnesium    Collection Time: 06/02/22  9:40 AM   Result Value Ref Range    Magnesium 1.8 1.6 - 2.6 mg/dL   Hemoglobin A1c    Collection Time: 06/02/22  9:40 AM   Result Value Ref Range    Hemoglobin A1C 13.9 (H) 4.5 - 6.2 %    Estimated Avg Glucose 352 (H) 68 - 131 mg/dL   Protime-INR    Collection Time: 06/02/22  9:40 AM   Result Value Ref Range    PT 69.2 (H) 11.4 - 13.7 sec    INR 8.9 (HH)            Assessment/Diagnosis:    1. Urine retention     2. Prostate cancer         Plans:    - I spent 20 minutes of the day of this encounter preparing for, treating and managing this patient. Doing well from a urologic standpoint regarding his LUTS. Explained that his hematuria was likely secondary to his INR of 9. Has resolved after holding his Coumadin per his cardiologist.   - Follow up with PCP ASAP regarding  his recent labs showing leukocytosis and JUAN J - message sent to Leana Perez.   - Continue observation for his incidental [place] 6 prostate cancer on TURP specimen.   - RTC in 1 year with symptom score and PVR.

## 2022-06-06 ENCOUNTER — OFFICE VISIT (OUTPATIENT)
Dept: FAMILY MEDICINE | Facility: CLINIC | Age: 87
End: 2022-06-06
Payer: MEDICARE

## 2022-06-06 ENCOUNTER — TELEPHONE (OUTPATIENT)
Dept: FAMILY MEDICINE | Facility: CLINIC | Age: 87
End: 2022-06-06

## 2022-06-06 ENCOUNTER — ANTI-COAG VISIT (OUTPATIENT)
Dept: FAMILY MEDICINE | Facility: CLINIC | Age: 87
End: 2022-06-06

## 2022-06-06 VITALS
OXYGEN SATURATION: 98 % | WEIGHT: 228.38 LBS | SYSTOLIC BLOOD PRESSURE: 128 MMHG | HEIGHT: 71 IN | DIASTOLIC BLOOD PRESSURE: 72 MMHG | HEART RATE: 76 BPM | BODY MASS INDEX: 31.97 KG/M2

## 2022-06-06 DIAGNOSIS — E11.65 UNCONTROLLED TYPE 2 DIABETES MELLITUS WITH HYPERGLYCEMIA: Primary | ICD-10-CM

## 2022-06-06 DIAGNOSIS — K59.00 CONSTIPATION, UNSPECIFIED CONSTIPATION TYPE: ICD-10-CM

## 2022-06-06 DIAGNOSIS — D72.829 LEUKOCYTOSIS, UNSPECIFIED TYPE: ICD-10-CM

## 2022-06-06 DIAGNOSIS — N17.9 AKI (ACUTE KIDNEY INJURY): ICD-10-CM

## 2022-06-06 DIAGNOSIS — R31.0 GROSS HEMATURIA: ICD-10-CM

## 2022-06-06 DIAGNOSIS — R79.1 ELEVATED INR: ICD-10-CM

## 2022-06-06 DIAGNOSIS — R63.0 ANOREXIA: ICD-10-CM

## 2022-06-06 LAB
BACTERIA UR CULT: ABNORMAL
INR PPP: 4.6

## 2022-06-06 PROCEDURE — 93793 ANTICOAG MGMT PT WARFARIN: CPT | Mod: S$GLB,,, | Performed by: NURSE PRACTITIONER

## 2022-06-06 PROCEDURE — 99215 OFFICE O/P EST HI 40 MIN: CPT | Mod: S$GLB,,, | Performed by: NURSE PRACTITIONER

## 2022-06-06 PROCEDURE — 99215 PR OFFICE/OUTPT VISIT, EST, LEVL V, 40-54 MIN: ICD-10-PCS | Mod: S$GLB,,, | Performed by: NURSE PRACTITIONER

## 2022-06-06 PROCEDURE — 93793 PR ANTICOAGULANT MGMT FOR PT TAKING WARFARIN: ICD-10-PCS | Mod: S$GLB,,, | Performed by: NURSE PRACTITIONER

## 2022-06-06 RX ORDER — INSULIN GLARGINE 300 U/ML
14 INJECTION, SOLUTION SUBCUTANEOUS DAILY
Qty: 1 PEN | Refills: 0 | COMMUNITY
Start: 2022-06-06 | End: 2022-06-28 | Stop reason: SDUPTHER

## 2022-06-06 RX ORDER — PEN NEEDLE, DIABETIC 30 GX3/16"
1 NEEDLE, DISPOSABLE MISCELLANEOUS DAILY
Qty: 100 EACH | Refills: 3 | Status: SHIPPED | OUTPATIENT
Start: 2022-06-06

## 2022-06-06 RX ORDER — SULFAMETHOXAZOLE AND TRIMETHOPRIM 800; 160 MG/1; MG/1
1 TABLET ORAL 2 TIMES DAILY
Qty: 14 TABLET | Refills: 0 | Status: SHIPPED | OUTPATIENT
Start: 2022-06-06 | End: 2022-06-13

## 2022-06-06 NOTE — PROGRESS NOTES
SUBJECTIVE:    Patient ID: Alex Hatch is a 86 y.o. male.    Chief Complaint: Follow-up (No bottles//Pt is here to discuss INR labs//Pt states that he stop his warfarin last week, when he strated passing blood clots through his urine.//decline eye exam and pna vaccine today.//ANGELA)    Pt here for sick visit- pt new to me, normally sees Leana MONZON. Pt had labs for Dr. Lux last week with multiple abnormal labs and told to f/u with PCP ASAP.A1C up to 13.9%, WBC 17K, INR 8.9  Pt reports last week had several days of chills, near rigors however denies any fever. No cough, n/v/d. Reports loss of appetite around that time. Reports last week around the same time he had 2 days of gross hematuria- INR resulted at 8.9 and warfarin has been on hold since Thursday with no further hematuria. Pt reports had flank pain around the time he saw the hematuria but that has resolved with the hematuria. Pt reports the past couple days he's actually started to feel a little better.  Saw Dr. Skaggs, urology on Friday for BPH- final urine culture from last week showed 10-50K citrobacter.   Pt with hx of DM- reports used to be on insulin however stopped last year d/t cost. Has only been taking metformin, can't afford ozempic either. Reports has not been taking lasix recently  Reports chronic constipation, usually goes every 3-4 days but it's been almost 10 days since last BM. Hasn't taken anything yet. Denies abd pain or n/v.      Office Visit on 06/03/2022   Component Date Value Ref Range Status    Urine Culture, Routine 06/03/2022  (A)  Final                    Value:CITROBACTER KOSERI  10,000 - 49,999 cfu/ml     Lab Visit on 06/02/2022   Component Date Value Ref Range Status    Sodium 06/02/2022 135 (A) 136 - 145 mmol/L Final    Potassium 06/02/2022 4.0  3.5 - 5.1 mmol/L Final    Chloride 06/02/2022 99  95 - 110 mmol/L Final    CO2 06/02/2022 25  23 - 29 mmol/L Final    Glucose 06/02/2022 252 (A) 70 - 110 mg/dL Final    BUN  06/02/2022 30 (A) 8 - 23 mg/dL Final    Creatinine 06/02/2022 2.0 (A) 0.5 - 1.4 mg/dL Final    Calcium 06/02/2022 8.4 (A) 8.7 - 10.5 mg/dL Final    Total Protein 06/02/2022 7.6  6.0 - 8.4 g/dL Final    Albumin 06/02/2022 2.9 (A) 3.5 - 5.2 g/dL Final    Total Bilirubin 06/02/2022 0.8  0.1 - 1.0 mg/dL Final    Alkaline Phosphatase 06/02/2022 90  55 - 135 U/L Final    AST 06/02/2022 18  10 - 40 U/L Final    ALT 06/02/2022 17  10 - 44 U/L Final    Anion Gap 06/02/2022 11  8 - 16 mmol/L Final    eGFR if  06/02/2022 34.4 (A) >60 mL/min/1.73 m^2 Final    eGFR if non African American 06/02/2022 29.8 (A) >60 mL/min/1.73 m^2 Final    WBC 06/02/2022 17.40 (A) 3.90 - 12.70 K/uL Final    RBC 06/02/2022 4.63  4.60 - 6.20 M/uL Final    Hemoglobin 06/02/2022 12.2 (A) 14.0 - 18.0 g/dL Final    Hematocrit 06/02/2022 38.5 (A) 40.0 - 54.0 % Final    MCV 06/02/2022 83  82 - 98 fL Final    MCH 06/02/2022 26.3 (A) 27.0 - 31.0 pg Final    MCHC 06/02/2022 31.7 (A) 32.0 - 36.0 g/dL Final    RDW 06/02/2022 14.5  11.5 - 14.5 % Final    Platelets 06/02/2022 323  150 - 450 K/uL Final    MPV 06/02/2022 9.9  9.2 - 12.9 fL Final    Immature Granulocytes 06/02/2022 1.1 (A) 0.0 - 0.5 % Final    Gran # (ANC) 06/02/2022 14.3 (A) 1.8 - 7.7 K/uL Final    Immature Grans (Abs) 06/02/2022 0.20 (A) 0.00 - 0.04 K/uL Final    Lymph # 06/02/2022 1.5  1.0 - 4.8 K/uL Final    Mono # 06/02/2022 1.2 (A) 0.3 - 1.0 K/uL Final    Eos # 06/02/2022 0.2  0.0 - 0.5 K/uL Final    Baso # 06/02/2022 0.08  0.00 - 0.20 K/uL Final    nRBC 06/02/2022 0  0 /100 WBC Final    Gran % 06/02/2022 82.3 (A) 38.0 - 73.0 % Final    Lymph % 06/02/2022 8.3 (A) 18.0 - 48.0 % Final    Mono % 06/02/2022 6.8  4.0 - 15.0 % Final    Eosinophil % 06/02/2022 1.0  0.0 - 8.0 % Final    Basophil % 06/02/2022 0.5  0.0 - 1.9 % Final    Differential Method 06/02/2022 Automated   Final    Magnesium 06/02/2022 1.8  1.6 - 2.6 mg/dL Final    Hemoglobin A1C  06/02/2022 13.9 (A) 4.5 - 6.2 % Final    Estimated Avg Glucose 06/02/2022 352 (A) 68 - 131 mg/dL Final    PT 06/02/2022 69.2 (A) 11.4 - 13.7 sec Final    INR 06/02/2022 8.9 (A)  Final       Past Medical History:   Diagnosis Date    Atrial fibrillation     Dr Lux    CKD (chronic kidney disease), stage III     Coronary artery disease     Diabetes mellitus, type 2     Hyperlipidemia     Hypertension      Past Surgical History:   Procedure Laterality Date    CARDIAC PACEMAKER PLACEMENT  5/2013    Dr Lux    COLONOSCOPY W/ POLYPECTOMY  2011    Dr Mar, repeat in 3 years    CORONARY ARTERY BYPASS GRAFT  1993    CYSTOSCOPY N/A 9/29/2021    Procedure: CYSTOSCOPY;  Surgeon: Ramona Skaggs Jr., MD;  Location: [facility] OR;  Service: Urology;  Laterality: N/A;    cassandra      TRANSRECTAL ULTRASOUND EXAMINATION N/A 9/29/2021    Procedure: ULTRASOUND, RECTAL APPROACH;  Surgeon: Ramona Skaggs Jr., MD;  Location: [facility] OR;  Service: Urology;  Laterality: N/A;    TRANSURETHRAL RESECTION OF PROSTATE N/A 10/26/2021    Procedure: TURP (TRANSURETHRAL RESECTION OF PROSTATE);  Surgeon: Ramona Skaggs Jr., MD;  Location: [facility] OR;  Service: Urology;  Laterality: N/A;     History reviewed. No pertinent family history.    The CVD Risk score (D'Agostino, et al., 2008) failed to calculate for the following reasons:    The 2008 CVD risk score is only valid for ages 30 to 74     Marital Status:   Alcohol History:  reports no history of alcohol use.  Tobacco History:  reports that he has never smoked. He has never used smokeless tobacco.  Drug History:  reports no history of drug use.    Health Maintenance Topics with due status: Not Due       Topic Last Completion Date    TETANUS VACCINE 03/30/2019    Lipid Panel 08/17/2021    Foot Exam 10/20/2021    Hemoglobin A1c 06/02/2022     Immunization History   Administered Date(s) Administered    COVID-19, MRNA, LN-S, PF (Pfizer) (Gray Cap) 05/19/2022    COVID-19, MRNA, LN-S,  PF (Pfizer) (Purple Cap) 03/05/2021, 03/26/2021, 10/18/2021    Influenza - High Dose - PF (65 years and older) 09/19/2015, 09/10/2016, 09/22/2018, 10/01/2019    Influenza - Quadrivalent - High Dose - PF (65 years and older) 09/02/2020, 10/04/2021    Influenza - Quadrivalent - PF *Preferred* (6 months and older) 09/03/2013    Influenza - Trivalent (ADULT) 08/22/2010    Influenza Whole 09/18/2014    Pneumococcal Conjugate - 13 Valent 03/30/2019    Pneumococcal Polysaccharide - 23 Valent 01/15/2014    Tdap 03/30/2019    Zoster Recombinant 03/30/2019, 06/27/2019       Review of patient's allergies indicates:   Allergen Reactions    Iodinated contrast media      Betadine ok       Current Outpatient Medications:     acetaminophen-codeine 300-60mg (TYLENOL #4) 300-60 mg Tab, TAKE 1 TABLET BY MOUTH EVERY 12 HOURS AS NEEDED FOR KNEE PAIN, Disp: , Rfl:     benazepriL (LOTENSIN) 10 MG tablet, , Disp: , Rfl:     benazepriL (LOTENSIN) 20 MG tablet, Take 20 mg by mouth once daily., Disp: , Rfl:     blood sugar diagnostic Strp, To check BG 3 times daily, to use with insurance preferred meter, Disp: 100 each, Rfl: 0    blood-glucose meter kit, To check BG 3 times daily, to use with insurance preferred meter, Disp: 1 each, Rfl: 0    clobetasoL (TEMOVATE) 0.05 % external solution, APPLY TWICE A DAY TO HEAD WHEN ITCHING, Disp: , Rfl:     furosemide (LASIX) 40 MG tablet, Take 1 tablet (40 mg total) by mouth daily as needed. (Patient not taking: Reported on 6/6/2022), Disp: 30 tablet, Rfl: 3    insulin glargine, TOUJEO, (TOUJEO SOLOSTAR U-300 INSULIN) 300 unit/mL (1.5 mL) InPn pen, Inject 14 Units into the skin once daily., Disp: 1 pen, Rfl: 0    lancets Misc, To check BG 3 times daily, to use with insurance preferred meter, Disp: 100 each, Rfl: 0    lovastatin (MEVACOR) 20 MG tablet, Take 1 tablet (20 mg total) by mouth every evening., Disp: 90 tablet, Rfl: 1    metFORMIN (GLUCOPHAGE) 500 MG tablet, Take 1 tablet  "(500 mg total) by mouth 2 (two) times daily with meals., Disp: 180 tablet, Rfl: 3    pen needle, diabetic 32 gauge x 5/32" Ndle, 1 each by Misc.(Non-Drug; Combo Route) route once daily at 6am., Disp: 100 each, Rfl: 3    semaglutide (OZEMPIC) 0.25 mg or 0.5 mg(2 mg/1.5 mL) pen injector, Inject 0.5 mg into the skin once a week. (Patient not taking: Reported on 6/6/2022), Disp: 1 pen, Rfl: 5    SSD 1 % cream, APPLY 1 APPLICATION TOPICALLY TWICE A DAY, Disp: , Rfl:     triamcinolone acetonide 0.1% (KENALOG) 0.1 % cream, APPLY 1 APPLICATION ON THE SKIN TWICE A DAY AS NEEDED DO NOT APPLY TO FACE, AXILLA OR GROIN, Disp: , Rfl:     warfarin (COUMADIN) 5 MG tablet, Take 7 mg by mouth once daily., Disp: , Rfl:     zolpidem (AMBIEN) 10 mg Tab, Take 1 tablet (10 mg total) by mouth nightly as needed (sleep)., Disp: 90 tablet, Rfl: 0    Review of Systems   Constitutional: Positive for appetite change and chills. Negative for diaphoresis and fever.   HENT: Negative for dental problem, facial swelling, mouth sores, sinus pain, sore throat and trouble swallowing.    Respiratory: Negative for cough, shortness of breath and wheezing.    Cardiovascular: Negative for chest pain, palpitations and leg swelling.   Gastrointestinal: Positive for constipation. Negative for abdominal pain, blood in stool, diarrhea, nausea and vomiting.   Genitourinary: Positive for flank pain (resolved with hematuria) and hematuria (see HPI). Negative for difficulty urinating, dysuria, scrotal swelling and testicular pain.   Musculoskeletal: Negative for joint swelling and neck pain.   Neurological: Negative for dizziness, syncope, speech difficulty and headaches.   Hematological: Bruises/bleeds easily.          Objective:      Vitals:    06/06/22 0850   BP: 128/72   Pulse: 76   SpO2: 98%   Weight: 103.6 kg (228 lb 6.4 oz)   Height: 5' 11" (1.803 m)     Physical Exam  Vitals and nursing note reviewed.   Constitutional:       General: He is not in acute " distress.     Appearance: He is well-developed. He is obese. He is not toxic-appearing.   HENT:      Head: Normocephalic and atraumatic.      Right Ear: Tympanic membrane and ear canal normal.      Left Ear: Tympanic membrane and ear canal normal.      Mouth/Throat:      Mouth: Mucous membranes are moist.      Pharynx: No posterior oropharyngeal erythema.   Neck:      Vascular: No carotid bruit.   Cardiovascular:      Rate and Rhythm: Normal rate and regular rhythm.      Heart sounds: No murmur heard.    No friction rub. No gallop.   Pulmonary:      Effort: Pulmonary effort is normal. No respiratory distress.      Breath sounds: Normal breath sounds. No wheezing or rales.   Abdominal:      General: There is no distension.      Palpations: Abdomen is soft.      Tenderness: There is no abdominal tenderness. There is no right CVA tenderness, left CVA tenderness, guarding or rebound.   Musculoskeletal:      Cervical back: Neck supple.      Right lower leg: No edema.      Left lower leg: No edema.   Lymphadenopathy:      Cervical: No cervical adenopathy.   Skin:     General: Skin is warm and dry.      Findings: No rash.   Neurological:      General: No focal deficit present.      Mental Status: He is alert and oriented to person, place, and time.      Gait: Gait normal.           Assessment:       1. Uncontrolled type 2 diabetes mellitus with hyperglycemia    2. Leukocytosis, unspecified type    3. Elevated INR    4. Anorexia     5. Gross hematuria    6. JUAN J (acute kidney injury)    7. Constipation, unspecified constipation type           Plan:       Uncontrolled type 2 diabetes mellitus with hyperglycemia  Comments:  reviewed labs with A1C up to 13.9%- pt advised he needs to resume insulin, given sample of toujeo- restart 14units daily, monitor sugars BID, f/u 1wk with log  Orders:  -     insulin glargine, TOUJEO, (TOUJEO SOLOSTAR U-300 INSULIN) 300 unit/mL (1.5 mL) InPn pen; Inject 14 Units into the skin once daily.   "Dispense: 1 pen; Refill: 0  -     pen needle, diabetic 32 gauge x 5/32" Ndle; 1 each by Misc.(Non-Drug; Combo Route) route once daily at 6am.  Dispense: 100 each; Refill: 3    Leukocytosis, unspecified type  Comments:  Unclear source of elevated white count though patient does report he is beginning to feel better, no obvious source on physical exam or history and I do not see indication today to order antibiotics or CT scan unless elevated WBC is persistent-   Will recheck white count today though cautioned if he develops any worsening symptoms to call, f/u 1 week with Leana  Orders:  -     CBC Auto Differential; Future; Expected date: 06/06/2022  -     Hepatitis Panel, Acute; Future; Expected date: 06/06/2022  -     Comprehensive Metabolic Panel; Future; Expected date: 06/06/2022    Elevated INR  Comments:  INR improved today 4.6, warfarin has been hold since Thursday.  Will send INR results to Dr. Lux who manages his INR    Anorexia   -     Hepatitis Panel, Acute; Future; Expected date: 06/06/2022    Gross hematuria  Comments:  Patient reports gross hematuria has resolved since last week, ?kidney stone.  No significant infection on urine culture from last week.  Orders:  -     X-Ray KUB; Future; Expected date: 06/06/2022    JUAN J (acute kidney injury)  Comments:  Stressed to increase fluids and do not take furosemide- will recheck lab today    Constipation, unspecified constipation type  Comments:  abd soft/nontender-  pt advised to use dulcolax suppository or fleets enema today, call if he develops abd pain, n/v or fever.      Follow up in about 1 week (around 6/13/2022) for Leana next visit, labs to be drawn today.            6/6/2022 Kaity Broussard NP      "

## 2022-06-06 NOTE — TELEPHONE ENCOUNTER
----- Message from Kita Alvarez MA sent at 6/6/2022  7:30 AM CDT -----  Regarding: discuss labs and potential hospitalization  Please call patient to work patient in for appt. Cardiologist would like for you to see this patient  [phone].

## 2022-06-06 NOTE — PATIENT INSTRUCTIONS
Start Toujeo insulin 14units once a day.  Monitor blood sugar first thing in the morning and before your evening meal.  Use suppository and/or enema today  Call for any worsening sympotms

## 2022-06-07 ENCOUNTER — TELEPHONE (OUTPATIENT)
Dept: FAMILY MEDICINE | Facility: CLINIC | Age: 87
End: 2022-06-07

## 2022-06-07 LAB
ALBUMIN SERPL-MCNC: 3.1 G/DL (ref 3.6–5.1)
ALBUMIN/GLOB SERPL: 0.8 (CALC) (ref 1–2.5)
ALP SERPL-CCNC: 91 U/L (ref 35–144)
ALT SERPL-CCNC: 11 U/L (ref 9–46)
AST SERPL-CCNC: 13 U/L (ref 10–35)
BASOPHILS # BLD AUTO: 95 CELLS/UL (ref 0–200)
BASOPHILS NFR BLD AUTO: 0.7 %
BILIRUB SERPL-MCNC: 0.9 MG/DL (ref 0.2–1.2)
BUN SERPL-MCNC: 22 MG/DL (ref 7–25)
BUN/CREAT SERPL: 12 (CALC) (ref 6–22)
CALCIUM SERPL-MCNC: 8.4 MG/DL (ref 8.6–10.3)
CHLORIDE SERPL-SCNC: 97 MMOL/L (ref 98–110)
CO2 SERPL-SCNC: 26 MMOL/L (ref 20–32)
COMMENT: NORMAL
CREAT SERPL-MCNC: 1.84 MG/DL (ref 0.7–1.11)
EOSINOPHIL # BLD AUTO: 204 CELLS/UL (ref 15–500)
EOSINOPHIL NFR BLD AUTO: 1.5 %
ERYTHROCYTE [DISTWIDTH] IN BLOOD BY AUTOMATED COUNT: 13.5 % (ref 11–15)
GLOBULIN SER CALC-MCNC: 4 G/DL (CALC) (ref 1.9–3.7)
GLUCOSE SERPL-MCNC: 231 MG/DL (ref 65–99)
HAV IGM SERPL QL IA: NORMAL
HBV CORE IGM SERPL QL IA: NORMAL
HBV SURFACE AG SERPL QL IA: NORMAL
HCT VFR BLD AUTO: 38.2 % (ref 38.5–50)
HCV AB S/CO SERPL IA: 0.06
HCV AB SERPL QL IA: NORMAL
HGB BLD-MCNC: 12.1 G/DL (ref 13.2–17.1)
LYMPHOCYTES # BLD AUTO: 1550 CELLS/UL (ref 850–3900)
LYMPHOCYTES NFR BLD AUTO: 11.4 %
MCH RBC QN AUTO: 26.4 PG (ref 27–33)
MCHC RBC AUTO-ENTMCNC: 31.7 G/DL (ref 32–36)
MCV RBC AUTO: 83.4 FL (ref 80–100)
MONOCYTES # BLD AUTO: 1129 CELLS/UL (ref 200–950)
MONOCYTES NFR BLD AUTO: 8.3 %
NEUTROPHILS # BLD AUTO: ABNORMAL CELLS/UL (ref 1500–7800)
NEUTROPHILS NFR BLD AUTO: 78.1 %
PLATELET # BLD AUTO: 377 THOUSAND/UL (ref 140–400)
PMV BLD REES-ECKER: 10.2 FL (ref 7.5–12.5)
POTASSIUM SERPL-SCNC: 4 MMOL/L (ref 3.5–5.3)
PROT SERPL-MCNC: 7.1 G/DL (ref 6.1–8.1)
RBC # BLD AUTO: 4.58 MILLION/UL (ref 4.2–5.8)
SODIUM SERPL-SCNC: 136 MMOL/L (ref 135–146)
WBC # BLD AUTO: 13.6 THOUSAND/UL (ref 3.8–10.8)

## 2022-06-07 NOTE — TELEPHONE ENCOUNTER
----- Message from Kaity Broussard NP sent at 6/7/2022 11:34 AM CDT -----  Please call patient and let him know labs from yesterday have improved some.  His white blood cell count is down from 17.4 to 13.6.  Kidney function also improved slightly.  His blood sugar remained high at 231 some make sure he started the insulin as we discussed.  Ask how he is feeling today and if he has had any further chills and also was he able to have a bowel movement after suppository/enema?

## 2022-06-07 NOTE — TELEPHONE ENCOUNTER
Spoke with pt in regards to recent blood work. Verbalized per Kaity labs from yesterday have improved some. Pt's white blood cell count is down from 17.4 to 13.6. Kidney function also improved slightly. His blood sugar remained high at 231 some make sure he started the insulin as we discussed. Pt stated that yes he has started the insulin. Pt states that he has had no further chills and he had a good B/M yesterday. Pt acknowledge understanding.

## 2022-06-08 ENCOUNTER — TELEPHONE (OUTPATIENT)
Dept: FAMILY MEDICINE | Facility: CLINIC | Age: 87
End: 2022-06-08

## 2022-06-08 NOTE — TELEPHONE ENCOUNTER
Spoke with pt in regards to recent blood work. Verbalized per Kaity hepatitis panel is negative on recent labs. Pt acknowledge understanding.

## 2022-06-08 NOTE — TELEPHONE ENCOUNTER
----- Message from Kaity Broussard NP sent at 6/7/2022  8:52 PM CDT -----  Please let pt know hepatitis panel is negative on recent labs

## 2022-06-10 ENCOUNTER — HOSPITAL ENCOUNTER (OUTPATIENT)
Dept: RADIOLOGY | Facility: HOSPITAL | Age: 87
Discharge: HOME | End: 2022-06-10
Attending: NURSE PRACTITIONER
Payer: MEDICARE

## 2022-06-10 DIAGNOSIS — R31.0 GROSS HEMATURIA: ICD-10-CM

## 2022-06-10 PROCEDURE — 74018 RADEX ABDOMEN 1 VIEW: CPT | Mod: TC,PO

## 2022-06-13 ENCOUNTER — TELEPHONE (OUTPATIENT)
Dept: FAMILY MEDICINE | Facility: CLINIC | Age: 87
End: 2022-06-13

## 2022-06-13 NOTE — TELEPHONE ENCOUNTER
----- Message from Kaity Broussard NP sent at 6/11/2022  9:48 PM CDT -----  Please call patient and let him know x-ray does show moderate amount of stool throughout the colon indicating constipatio- recommend he use a suppository today and again MiraLax daily-

## 2022-06-13 NOTE — TELEPHONE ENCOUNTER
Spoke with pt in regards recent x-ray. Verbalized per Kaity x-ray does show moderate amount of stool throughout the colon indicating constipatio- recommend he use a suppository today and again MiraLax daily. Pt acknowledge understanding

## 2022-06-14 ENCOUNTER — TELEPHONE (OUTPATIENT)
Dept: UROLOGY | Facility: CLINIC | Age: 87
End: 2022-06-14
Payer: MEDICARE

## 2022-06-14 ENCOUNTER — OFFICE VISIT (OUTPATIENT)
Dept: FAMILY MEDICINE | Facility: CLINIC | Age: 87
End: 2022-06-14
Payer: MEDICARE

## 2022-06-14 VITALS
WEIGHT: 220 LBS | BODY MASS INDEX: 30.8 KG/M2 | HEART RATE: 84 BPM | SYSTOLIC BLOOD PRESSURE: 112 MMHG | HEIGHT: 71 IN | DIASTOLIC BLOOD PRESSURE: 66 MMHG

## 2022-06-14 DIAGNOSIS — K59.00 CONSTIPATION, UNSPECIFIED CONSTIPATION TYPE: Primary | ICD-10-CM

## 2022-06-14 DIAGNOSIS — E78.5 HYPERLIPIDEMIA, UNSPECIFIED HYPERLIPIDEMIA TYPE: ICD-10-CM

## 2022-06-14 DIAGNOSIS — I48.0 PAROXYSMAL ATRIAL FIBRILLATION: ICD-10-CM

## 2022-06-14 DIAGNOSIS — I25.10 CORONARY ARTERY DISEASE, UNSPECIFIED VESSEL OR LESION TYPE, UNSPECIFIED WHETHER ANGINA PRESENT, UNSPECIFIED WHETHER NATIVE OR TRANSPLANTED HEART: ICD-10-CM

## 2022-06-14 DIAGNOSIS — I10 HYPERTENSION, UNSPECIFIED TYPE: ICD-10-CM

## 2022-06-14 DIAGNOSIS — D72.829 LEUKOCYTOSIS, UNSPECIFIED TYPE: ICD-10-CM

## 2022-06-14 PROCEDURE — 99214 PR OFFICE/OUTPT VISIT, EST, LEVL IV, 30-39 MIN: ICD-10-PCS | Mod: S$GLB,,, | Performed by: NURSE PRACTITIONER

## 2022-06-14 PROCEDURE — 99214 OFFICE O/P EST MOD 30 MIN: CPT | Mod: S$GLB,,, | Performed by: NURSE PRACTITIONER

## 2022-06-14 RX ORDER — DOCUSATE SODIUM 100 MG/1
100 CAPSULE, LIQUID FILLED ORAL 2 TIMES DAILY
Qty: 60 CAPSULE | Refills: 2 | Status: SHIPPED | OUTPATIENT
Start: 2022-06-14 | End: 2023-10-02

## 2022-06-14 NOTE — TELEPHONE ENCOUNTER
----- Message from Cristy Cazares sent at 6/14/2022 12:34 PM CDT -----  Patient Call Back    Who Called:  Pt     What is the request in detail:Pt calling to speak with someone regarding his last visit. Pls advise.    Can the clinic reply by [facility]?    Best Call Back Number: [phone]

## 2022-06-14 NOTE — TELEPHONE ENCOUNTER
Called and spoke to patient. Patient states that at last OV there was misunderstanding regarding lab work. Reviewed last OV note. Informed that MD recommended follow up with PCP regarding recent labs for leukocytosis and JUAN J. Patient states that he did follow up with PCP and asking if he needs to come back in for an appt. Informed that he is due for a follow up in 1 year. Will route to MD to see if appt is needed sooner to discuss. Patient voiced understanding.

## 2022-06-14 NOTE — PROGRESS NOTES
SUBJECTIVE:    Patient ID: Alex Hatch is a 86 y.o. male.    Chief Complaint: Diabetes (Patient is here for a 1 week follow up due to elevated A1C he states he has been taking the Toujeo samples but does not check his sugar at home//bs) and Constipation (Has been having constipation for years but It has gotten worse)    86 year old male presents for follow up. Patient was seen last week by priyanka moore for urgent visit. Patient had labs at dr salazar office and was found to have inr of 8.9 and an a1c of 13.9. [place]bc [place] 712[address]. He was started on toujeo 14 units. He has not started monitoring at home. He took bactrim per dr. Skaggs. He has since followed by with paul santacruz regarding inr was was started back on 5mg daily. He does report that he is feeling much better overall.        Anti-coag visit on 06/06/2022   Component Date Value Ref Range Status    INR 06/06/2022 4.6   Final   Office Visit on 06/06/2022   Component Date Value Ref Range Status    WBC 06/06/2022 13.6 (A) 3.8 - 10.8 Thousand/uL Final    RBC 06/06/2022 4.58  4.20 - 5.80 Million/uL Final    Hemoglobin 06/06/2022 12.1 (A) 13.2 - 17.1 g/dL Final    Hematocrit 06/06/2022 38.2 (A) 38.5 - 50.0 % Final    MCV 06/06/2022 83.4  80.0 - 100.0 fL Final    MCH 06/06/2022 26.4 (A) 27.0 - 33.0 pg Final    MCHC 06/06/2022 31.7 (A) 32.0 - 36.0 g/dL Final    RDW 06/06/2022 13.5  11.0 - 15.0 % Final    Platelets 06/06/2022 377  140 - 400 Thousand/uL Final    MPV 06/06/2022 10.2  7.5 - 12.5 fL Final    Neutrophils, Abs 06/06/2022 10,622 (A) 1,500 - 7,800 cells/uL Final    Lymph # 06/06/2022 1,550  850 - 3,900 cells/uL Final    Mono # 06/06/2022 1,129 (A) 200 - 950 cells/uL Final    Eos # 06/06/2022 204  15 - 500 cells/uL Final    Baso # 06/06/2022 95  0 - 200 cells/uL Final    Neutrophils Relative 06/06/2022 78.1  % Final    Lymph % 06/06/2022 11.4  % Final    Mono % 06/06/2022 8.3  % Final    Eosinophil % 06/06/2022 1.5  % Final     Basophil % 06/06/2022 0.7  % Final    Hep A IgM 06/06/2022 NON-REACTIVE  NON-REACTIVE Final    Comment 06/06/2022 For additional information, please refer to [email or web address] (This link is being provided for informational/ educational purposes only.)   Final    Hepatitis B Surface Ag 06/06/2022 NON-REACTIVE  NON-REACTIVE Final    Hep B C IgM 06/06/2022 NON-REACTIVE  NON-REACTIVE Final    Hepatitis C Ab 06/06/2022 NON-REACTIVE  NON-REACTIVE Final    Signal/Cutoff 06/06/2022 0.06  <1.00 Final    Glucose 06/06/2022 231 (A) 65 - 99 mg/dL Final    BUN 06/06/2022 22  7 - 25 mg/dL Final    Creatinine 06/06/2022 1.84 (A) 0.70 - 1.11 mg/dL Final    eGFR if non  06/06/2022 33 (A) > OR = 60 mL/min/1.73m2 Final    eGFR if  06/06/2022 38 (A) > OR = 60 mL/min/1.73m2 Final    BUN/Creatinine Ratio 06/06/2022 12  6 - 22 (calc) Final    Sodium 06/06/2022 136  135 - 146 mmol/L Final    Potassium 06/06/2022 4.0  3.5 - 5.3 mmol/L Final    Chloride 06/06/2022 97 (A) 98 - 110 mmol/L Final    CO2 06/06/2022 26  20 - 32 mmol/L Final    Calcium 06/06/2022 8.4 (A) 8.6 - 10.3 mg/dL Final    Total Protein 06/06/2022 7.1  6.1 - 8.1 g/dL Final    Albumin 06/06/2022 3.1 (A) 3.6 - 5.1 g/dL Final    Globulin, Total 06/06/2022 4.0 (A) 1.9 - 3.7 g/dL (calc) Final    Albumin/Globulin Ratio 06/06/2022 0.8 (A) 1.0 - 2.5 (calc) Final    Total Bilirubin 06/06/2022 0.9  0.2 - 1.2 mg/dL Final    Alkaline Phosphatase 06/06/2022 91  35 - 144 U/L Final    AST 06/06/2022 13  10 - 35 U/L Final    ALT 06/06/2022 11  9 - 46 U/L Final   Office Visit on 06/03/2022   Component Date Value Ref Range Status    Urine Culture, Routine 06/03/2022  (A)  Final                    Value:CITROBACTER KOSERI  10,000 - 49,999 cfu/ml     Lab Visit on 06/02/2022   Component Date Value Ref Range Status    Sodium 06/02/2022 135 (A) 136 - 145 mmol/L Final    Potassium 06/02/2022 4.0  3.5 - 5.1  mmol/L Final    Chloride 06/02/2022 99  95 - 110 mmol/L Final    CO2 06/02/2022 25  23 - 29 mmol/L Final    Glucose 06/02/2022 252 (A) 70 - 110 mg/dL Final    BUN 06/02/2022 30 (A) 8 - 23 mg/dL Final    Creatinine 06/02/2022 2.0 (A) 0.5 - 1.4 mg/dL Final    Calcium 06/02/2022 8.4 (A) 8.7 - 10.5 mg/dL Final    Total Protein 06/02/2022 7.6  6.0 - 8.4 g/dL Final    Albumin 06/02/2022 2.9 (A) 3.5 - 5.2 g/dL Final    Total Bilirubin 06/02/2022 0.8  0.1 - 1.0 mg/dL Final    Alkaline Phosphatase 06/02/2022 90  55 - 135 U/L Final    AST 06/02/2022 18  10 - 40 U/L Final    ALT 06/02/2022 17  10 - 44 U/L Final    Anion Gap 06/02/2022 11  8 - 16 mmol/L Final    eGFR if  06/02/2022 34.4 (A) >60 mL/min/1.73 m^2 Final    eGFR if non African American 06/02/2022 29.8 (A) >60 mL/min/1.73 m^2 Final    WBC 06/02/2022 17.40 (A) 3.90 - 12.70 K/uL Final    RBC 06/02/2022 4.63  4.60 - 6.20 M/uL Final    Hemoglobin 06/02/2022 12.2 (A) 14.0 - 18.0 g/dL Final    Hematocrit 06/02/2022 38.5 (A) 40.0 - 54.0 % Final    MCV 06/02/2022 83  82 - 98 fL Final    MCH 06/02/2022 26.3 (A) 27.0 - 31.0 pg Final    MCHC 06/02/2022 31.7 (A) 32.0 - 36.0 g/dL Final    RDW 06/02/2022 14.5  11.5 - 14.5 % Final    Platelets 06/02/2022 323  150 - 450 K/uL Final    MPV 06/02/2022 9.9  9.2 - 12.9 fL Final    Immature Granulocytes 06/02/2022 1.1 (A) 0.0 - 0.5 % Final    Gran # (ANC) 06/02/2022 14.3 (A) 1.8 - 7.7 K/uL Final    Immature Grans (Abs) 06/02/2022 0.20 (A) 0.00 - 0.04 K/uL Final    Lymph # 06/02/2022 1.5  1.0 - 4.8 K/uL Final    Mono # 06/02/2022 1.2 (A) 0.3 - 1.0 K/uL Final    Eos # 06/02/2022 0.2  0.0 - 0.5 K/uL Final    Baso # 06/02/2022 0.08  0.00 - 0.20 K/uL Final    nRBC 06/02/2022 0  0 /100 WBC Final    Gran % 06/02/2022 82.3 (A) 38.0 - 73.0 % Final    Lymph % 06/02/2022 8.3 (A) 18.0 - 48.0 % Final    Mono % 06/02/2022 6.8  4.0 - 15.0 % Final    Eosinophil % 06/02/2022 1.0  0.0 - 8.0 % Final     Basophil % 06/02/2022 0.5  0.0 - 1.9 % Final    Differential Method 06/02/2022 Automated   Final    Magnesium 06/02/2022 1.8  1.6 - 2.6 mg/dL Final    Hemoglobin A1C 06/02/2022 13.9 (A) 4.5 - 6.2 % Final    Estimated Avg Glucose 06/02/2022 352 (A) 68 - 131 mg/dL Final    PT 06/02/2022 69.2 (A) 11.4 - 13.7 sec Final    INR 06/02/2022 8.9 (A)  Final       Past Medical History:   Diagnosis Date    Atrial fibrillation     Dr Lux    CKD (chronic kidney disease), stage III     Coronary artery disease     Diabetes mellitus, type 2     Hyperlipidemia     Hypertension      Social History     Socioeconomic History    Marital status:    Tobacco Use    Smoking status: Never Smoker    Smokeless tobacco: Never Used   Substance and Sexual Activity    Alcohol use: No    Drug use: No     Past Surgical History:   Procedure Laterality Date    CARDIAC PACEMAKER PLACEMENT  5/2013    Dr Lux    COLONOSCOPY W/ POLYPECTOMY  2011    Dr Mar, repeat in 3 years    CORONARY ARTERY BYPASS GRAFT  1993    CYSTOSCOPY N/A 9/29/2021    Procedure: CYSTOSCOPY;  Surgeon: Ramona Skaggs Jr., MD;  Location: [facility] OR;  Service: Urology;  Laterality: N/A;    cassandra      TRANSRECTAL ULTRASOUND EXAMINATION N/A 9/29/2021    Procedure: ULTRASOUND, RECTAL APPROACH;  Surgeon: Ramona Skaggs Jr., MD;  Location: [facility] OR;  Service: Urology;  Laterality: N/A;    TRANSURETHRAL RESECTION OF PROSTATE N/A 10/26/2021    Procedure: TURP (TRANSURETHRAL RESECTION OF PROSTATE);  Surgeon: Ramona Skaggs Jr., MD;  Location: [facility] OR;  Service: Urology;  Laterality: N/A;     History reviewed. No pertinent family history.    Review of patient's allergies indicates:   Allergen Reactions    Macrobid [nitrofurantoin monohyd/m-cryst]     Iodinated contrast media      Betadine ok       Current Outpatient Medications:     acetaminophen-codeine 300-60mg (TYLENOL #4) 300-60 mg Tab, TAKE 1 TABLET BY MOUTH EVERY 12 HOURS AS NEEDED FOR KNEE PAIN,  "Disp: , Rfl:     benazepriL (LOTENSIN) 10 MG tablet, , Disp: , Rfl:     benazepriL (LOTENSIN) 20 MG tablet, Take 20 mg by mouth once daily., Disp: , Rfl:     blood sugar diagnostic Strp, To check BG 3 times daily, to use with insurance preferred meter, Disp: 100 each, Rfl: 0    blood-glucose meter kit, To check BG 3 times daily, to use with insurance preferred meter, Disp: 1 each, Rfl: 0    clobetasoL (TEMOVATE) 0.05 % external solution, APPLY TWICE A DAY TO HEAD WHEN ITCHING, Disp: , Rfl:     docusate sodium (COLACE) 100 MG capsule, Take 1 capsule (100 mg total) by mouth 2 (two) times daily., Disp: 60 capsule, Rfl: 2    furosemide (LASIX) 40 MG tablet, Take 1 tablet (40 mg total) by mouth daily as needed. (Patient not taking: Reported on 6/6/2022), Disp: 30 tablet, Rfl: 3    insulin glargine, TOUJEO, (TOUJEO SOLOSTAR U-300 INSULIN) 300 unit/mL (1.5 mL) InPn pen, Inject 14 Units into the skin once daily., Disp: 1 pen, Rfl: 0    lancets Misc, To check BG 3 times daily, to use with insurance preferred meter, Disp: 100 each, Rfl: 0    lovastatin (MEVACOR) 20 MG tablet, Take 1 tablet (20 mg total) by mouth every evening., Disp: 90 tablet, Rfl: 1    metFORMIN (GLUCOPHAGE) 500 MG tablet, Take 1 tablet (500 mg total) by mouth 2 (two) times daily with meals., Disp: 180 tablet, Rfl: 3    pen needle, diabetic 32 gauge x 5/32" Ndle, 1 each by Misc.(Non-Drug; Combo Route) route once daily at 6am., Disp: 100 each, Rfl: 3    semaglutide (OZEMPIC) 0.25 mg or 0.5 mg(2 mg/1.5 mL) pen injector, Inject 0.5 mg into the skin once a week. (Patient not taking: Reported on 6/6/2022), Disp: 1 pen, Rfl: 5    SSD 1 % cream, APPLY 1 APPLICATION TOPICALLY TWICE A DAY, Disp: , Rfl:     triamcinolone acetonide 0.1% (KENALOG) 0.1 % cream, APPLY 1 APPLICATION ON THE SKIN TWICE A DAY AS NEEDED DO NOT APPLY TO FACE, AXILLA OR GROIN, Disp: , Rfl:     warfarin (COUMADIN) 5 MG tablet, Take 7 mg by mouth once daily., Disp: , Rfl:     " "zolpidem (AMBIEN) 10 mg Tab, Take 1 tablet (10 mg total) by mouth nightly as needed (sleep)., Disp: 90 tablet, Rfl: 0    Review of Systems   Constitutional: Negative for appetite change, chills, diaphoresis and fever.   HENT: Negative for dental problem, facial swelling, mouth sores, sinus pain, sore throat and trouble swallowing.    Respiratory: Negative for cough, shortness of breath and wheezing.    Cardiovascular: Negative for chest pain, palpitations and leg swelling.   Gastrointestinal: Positive for constipation. Negative for abdominal pain, blood in stool, diarrhea, nausea and vomiting.   Genitourinary: Negative for difficulty urinating, dysuria, flank pain (resolved with hematuria), hematuria (see HPI), scrotal swelling and testicular pain.   Musculoskeletal: Negative for joint swelling and neck pain.   Neurological: Negative for dizziness, syncope, speech difficulty and headaches.   Hematological: Bruises/bleeds easily.          Objective:      Vitals:    06/14/22 1007   BP: 112/66   Pulse: 84   Weight: 99.8 kg (220 lb)   Height: 5' 11" (1.803 m)     Physical Exam  Vitals and nursing note reviewed.   Constitutional:       General: He is not in acute distress.     Appearance: He is well-developed. He is obese. He is not ill-appearing or toxic-appearing.      Comments: Cane with ambulation   HENT:      Head: Normocephalic and atraumatic.      Mouth/Throat:      Mouth: Mucous membranes are moist.      Pharynx: No posterior oropharyngeal erythema.   Neck:      Vascular: No carotid bruit.   Cardiovascular:      Rate and Rhythm: Normal rate and regular rhythm.      Heart sounds: No murmur heard.    No friction rub. No gallop.   Pulmonary:      Effort: Pulmonary effort is normal. No respiratory distress.      Breath sounds: Normal breath sounds. No wheezing or rales.   Abdominal:      General: There is no distension.      Palpations: Abdomen is soft.      Tenderness: There is no abdominal tenderness. There is no " right CVA tenderness, left CVA tenderness, guarding or rebound.   Musculoskeletal:      Cervical back: Neck supple.      Right lower leg: No edema.      Left lower leg: No edema.   Lymphadenopathy:      Cervical: No cervical adenopathy.   Skin:     General: Skin is warm and dry.      Findings: No rash.   Neurological:      General: No focal deficit present.      Mental Status: He is alert and oriented to person, place, and time.      Gait: Gait normal.           Assessment:       1. Constipation, unspecified constipation type    2. Coronary artery disease, unspecified vessel or lesion type, unspecified whether angina present, unspecified whether native or transplanted heart    3. Paroxysmal atrial fibrillation    4. Hyperlipidemia, unspecified hyperlipidemia type    5. Hypertension, unspecified type    6. Diabetes mellitus type 2, uncontrolled, with complications    7. Leukocytosis, unspecified type         Plan:       Constipation, unspecified constipation type  -     docusate sodium (COLACE) 100 MG capsule; Take 1 capsule (100 mg total) by mouth 2 (two) times daily.  Dispense: 60 capsule; Refill: 2    Coronary artery disease, unspecified vessel or lesion type, unspecified whether angina present, unspecified whether native or transplanted heart    Paroxysmal atrial fibrillation    Hyperlipidemia, unspecified hyperlipidemia type    Hypertension, unspecified type    Diabetes mellitus type 2, uncontrolled, with complications  Comments:  increase toujeo to 20 units daily    Leukocytosis, unspecified type  -     CBC Auto Differential; Future; Expected date: 06/20/2022      Follow up in about 2 weeks (around 6/28/2022).        6/20/2022 Leana Perez

## 2022-06-28 ENCOUNTER — OFFICE VISIT (OUTPATIENT)
Dept: FAMILY MEDICINE | Facility: CLINIC | Age: 87
End: 2022-06-28
Payer: MEDICARE

## 2022-06-28 VITALS
DIASTOLIC BLOOD PRESSURE: 86 MMHG | HEIGHT: 71 IN | OXYGEN SATURATION: 99 % | SYSTOLIC BLOOD PRESSURE: 132 MMHG | RESPIRATION RATE: 19 BRPM | WEIGHT: 218 LBS | HEART RATE: 85 BPM | BODY MASS INDEX: 30.52 KG/M2

## 2022-06-28 DIAGNOSIS — K59.00 CONSTIPATION, UNSPECIFIED CONSTIPATION TYPE: ICD-10-CM

## 2022-06-28 DIAGNOSIS — D72.829 LEUKOCYTOSIS, UNSPECIFIED TYPE: ICD-10-CM

## 2022-06-28 DIAGNOSIS — E78.5 HYPERLIPIDEMIA, UNSPECIFIED HYPERLIPIDEMIA TYPE: ICD-10-CM

## 2022-06-28 DIAGNOSIS — N18.31 STAGE 3A CHRONIC KIDNEY DISEASE: Primary | ICD-10-CM

## 2022-06-28 DIAGNOSIS — I25.10 CORONARY ARTERY DISEASE, UNSPECIFIED VESSEL OR LESION TYPE, UNSPECIFIED WHETHER ANGINA PRESENT, UNSPECIFIED WHETHER NATIVE OR TRANSPLANTED HEART: ICD-10-CM

## 2022-06-28 DIAGNOSIS — I48.0 PAROXYSMAL ATRIAL FIBRILLATION: ICD-10-CM

## 2022-06-28 DIAGNOSIS — E11.65 UNCONTROLLED TYPE 2 DIABETES MELLITUS WITH HYPERGLYCEMIA: ICD-10-CM

## 2022-06-28 DIAGNOSIS — I10 HYPERTENSION, UNSPECIFIED TYPE: ICD-10-CM

## 2022-06-28 PROCEDURE — 99214 OFFICE O/P EST MOD 30 MIN: CPT | Mod: S$GLB,,, | Performed by: NURSE PRACTITIONER

## 2022-06-28 PROCEDURE — 99214 PR OFFICE/OUTPT VISIT, EST, LEVL IV, 30-39 MIN: ICD-10-PCS | Mod: S$GLB,,, | Performed by: NURSE PRACTITIONER

## 2022-06-28 RX ORDER — INSULIN GLARGINE 300 U/ML
24 INJECTION, SOLUTION SUBCUTANEOUS DAILY
Qty: 1 PEN | Refills: 0
Start: 2022-06-28 | End: 2022-11-03 | Stop reason: SDUPTHER

## 2022-06-28 RX ORDER — POLYETHYLENE GLYCOL 3350 17 G/17G
17 POWDER, FOR SOLUTION ORAL DAILY
Qty: 100 EACH | Refills: 2 | Status: SHIPPED | OUTPATIENT
Start: 2022-06-28

## 2022-07-06 NOTE — PROGRESS NOTES
SUBJECTIVE:    Patient ID: Alex Hatch is a 87 y.o. male.    Chief Complaint: Diabetes and Constipation    86 year old male presents for follow up. Patient has been seen on 2 different occasions for abnormal labs with dr. Skaggs. At that time hiswas found to have inr of 8.9 and an a1c of 13.9. [place] wbc [place] 232[address]. His inr is monitored at dr. salazar office and reports it is back in 'normal limits' he is taking toujeo 20 units at home. He started monitoring his sugar about 4 days ago. Numbers are still elevated. Still having issues with constipation. Took colace with minimal improvement.      Anti-coag visit on 06/06/2022   Component Date Value Ref Range Status    INR 06/06/2022 4.6   Final   Office Visit on 06/06/2022   Component Date Value Ref Range Status    WBC 06/06/2022 13.6 (A) 3.8 - 10.8 Thousand/uL Final    RBC 06/06/2022 4.58  4.20 - 5.80 Million/uL Final    Hemoglobin 06/06/2022 12.1 (A) 13.2 - 17.1 g/dL Final    Hematocrit 06/06/2022 38.2 (A) 38.5 - 50.0 % Final    MCV 06/06/2022 83.4  80.0 - 100.0 fL Final    MCH 06/06/2022 26.4 (A) 27.0 - 33.0 pg Final    MCHC 06/06/2022 31.7 (A) 32.0 - 36.0 g/dL Final    RDW 06/06/2022 13.5  11.0 - 15.0 % Final    Platelets 06/06/2022 377  140 - 400 Thousand/uL Final    MPV 06/06/2022 10.2  7.5 - 12.5 fL Final    Neutrophils, Abs 06/06/2022 10,622 (A) 1,500 - 7,800 cells/uL Final    Lymph # 06/06/2022 1,550  850 - 3,900 cells/uL Final    Mono # 06/06/2022 1,129 (A) 200 - 950 cells/uL Final    Eos # 06/06/2022 204  15 - 500 cells/uL Final    Baso # 06/06/2022 95  0 - 200 cells/uL Final    Neutrophils Relative 06/06/2022 78.1  % Final    Lymph % 06/06/2022 11.4  % Final    Mono % 06/06/2022 8.3  % Final    Eosinophil % 06/06/2022 1.5  % Final    Basophil % 06/06/2022 0.7  % Final    Hep A IgM 06/06/2022 NON-REACTIVE  NON-REACTIVE Final    Comment 06/06/2022 For additional information, please refer to  [email or web address]/faq/[email or web address] (This link is being provided for informational/ educational purposes only.)   Final    Hepatitis B Surface Ag 06/06/2022 NON-REACTIVE  NON-REACTIVE Final    Hep B C IgM 06/06/2022 NON-REACTIVE  NON-REACTIVE Final    Hepatitis C Ab 06/06/2022 NON-REACTIVE  NON-REACTIVE Final    Signal/Cutoff 06/06/2022 0.06  <1.00 Final    Glucose 06/06/2022 231 (A) 65 - 99 mg/dL Final    BUN 06/06/2022 22  7 - 25 mg/dL Final    Creatinine 06/06/2022 1.84 (A) 0.70 - 1.11 mg/dL Final    eGFR if non  06/06/2022 33 (A) > OR = 60 mL/min/1.73m2 Final    eGFR if  06/06/2022 38 (A) > OR = 60 mL/min/1.73m2 Final    BUN/Creatinine Ratio 06/06/2022 12  6 - 22 (calc) Final    Sodium 06/06/2022 136  135 - 146 mmol/L Final    Potassium 06/06/2022 4.0  3.5 - 5.3 mmol/L Final    Chloride 06/06/2022 97 (A) 98 - 110 mmol/L Final    CO2 06/06/2022 26  20 - 32 mmol/L Final    Calcium 06/06/2022 8.4 (A) 8.6 - 10.3 mg/dL Final    Total Protein 06/06/2022 7.1  6.1 - 8.1 g/dL Final    Albumin 06/06/2022 3.1 (A) 3.6 - 5.1 g/dL Final    Globulin, Total 06/06/2022 4.0 (A) 1.9 - 3.7 g/dL (calc) Final    Albumin/Globulin Ratio 06/06/2022 0.8 (A) 1.0 - 2.5 (calc) Final    Total Bilirubin 06/06/2022 0.9  0.2 - 1.2 mg/dL Final    Alkaline Phosphatase 06/06/2022 91  35 - 144 U/L Final    AST 06/06/2022 13  10 - 35 U/L Final    ALT 06/06/2022 11  9 - 46 U/L Final   Office Visit on 06/03/2022   Component Date Value Ref Range Status    Urine Culture, Routine 06/03/2022  (A)  Final                    Value:CITROBACTER KOSERI  10,000 - 49,999 cfu/ml     Lab Visit on 06/02/2022   Component Date Value Ref Range Status    Sodium 06/02/2022 135 (A) 136 - 145 mmol/L Final    Potassium 06/02/2022 4.0  3.5 - 5.1 mmol/L Final    Chloride 06/02/2022 99  95 - 110 mmol/L Final    CO2 06/02/2022 25  23 - 29 mmol/L Final    Glucose 06/02/2022 252 (A) 70 - 110 mg/dL Final     BUN 06/02/2022 30 (A) 8 - 23 mg/dL Final    Creatinine 06/02/2022 2.0 (A) 0.5 - 1.4 mg/dL Final    Calcium 06/02/2022 8.4 (A) 8.7 - 10.5 mg/dL Final    Total Protein 06/02/2022 7.6  6.0 - 8.4 g/dL Final    Albumin 06/02/2022 2.9 (A) 3.5 - 5.2 g/dL Final    Total Bilirubin 06/02/2022 0.8  0.1 - 1.0 mg/dL Final    Alkaline Phosphatase 06/02/2022 90  55 - 135 U/L Final    AST 06/02/2022 18  10 - 40 U/L Final    ALT 06/02/2022 17  10 - 44 U/L Final    Anion Gap 06/02/2022 11  8 - 16 mmol/L Final    eGFR if  06/02/2022 34.4 (A) >60 mL/min/1.73 m^2 Final    eGFR if non African American 06/02/2022 29.8 (A) >60 mL/min/1.73 m^2 Final    WBC 06/02/2022 17.40 (A) 3.90 - 12.70 K/uL Final    RBC 06/02/2022 4.63  4.60 - 6.20 M/uL Final    Hemoglobin 06/02/2022 12.2 (A) 14.0 - 18.0 g/dL Final    Hematocrit 06/02/2022 38.5 (A) 40.0 - 54.0 % Final    MCV 06/02/2022 83  82 - 98 fL Final    MCH 06/02/2022 26.3 (A) 27.0 - 31.0 pg Final    MCHC 06/02/2022 31.7 (A) 32.0 - 36.0 g/dL Final    RDW 06/02/2022 14.5  11.5 - 14.5 % Final    Platelets 06/02/2022 323  150 - 450 K/uL Final    MPV 06/02/2022 9.9  9.2 - 12.9 fL Final    Immature Granulocytes 06/02/2022 1.1 (A) 0.0 - 0.5 % Final    Gran # (ANC) 06/02/2022 14.3 (A) 1.8 - 7.7 K/uL Final    Immature Grans (Abs) 06/02/2022 0.20 (A) 0.00 - 0.04 K/uL Final    Lymph # 06/02/2022 1.5  1.0 - 4.8 K/uL Final    Mono # 06/02/2022 1.2 (A) 0.3 - 1.0 K/uL Final    Eos # 06/02/2022 0.2  0.0 - 0.5 K/uL Final    Baso # 06/02/2022 0.08  0.00 - 0.20 K/uL Final    nRBC 06/02/2022 0  0 /100 WBC Final    Gran % 06/02/2022 82.3 (A) 38.0 - 73.0 % Final    Lymph % 06/02/2022 8.3 (A) 18.0 - 48.0 % Final    Mono % 06/02/2022 6.8  4.0 - 15.0 % Final    Eosinophil % 06/02/2022 1.0  0.0 - 8.0 % Final    Basophil % 06/02/2022 0.5  0.0 - 1.9 % Final    Differential Method 06/02/2022 Automated   Final    Magnesium 06/02/2022 1.8  1.6 - 2.6 mg/dL Final    Hemoglobin  A1C 06/02/2022 13.9 (A) 4.5 - 6.2 % Final    Estimated Avg Glucose 06/02/2022 352 (A) 68 - 131 mg/dL Final    PT 06/02/2022 69.2 (A) 11.4 - 13.7 sec Final    INR 06/02/2022 8.9 (A)  Final       Past Medical History:   Diagnosis Date    Atrial fibrillation     Dr Lux    CKD (chronic kidney disease), stage III     Coronary artery disease     Diabetes mellitus, type 2     Hyperlipidemia     Hypertension      Social History     Socioeconomic History    Marital status:    Tobacco Use    Smoking status: Never Smoker    Smokeless tobacco: Never Used   Substance and Sexual Activity    Alcohol use: No    Drug use: No     Past Surgical History:   Procedure Laterality Date    CARDIAC PACEMAKER PLACEMENT  5/2013    Dr Lux    COLONOSCOPY W/ POLYPECTOMY  2011    Dr Mar, repeat in 3 years    CORONARY ARTERY BYPASS GRAFT  1993    CYSTOSCOPY N/A 9/29/2021    Procedure: CYSTOSCOPY;  Surgeon: Ramona Skaggs Jr., MD;  Location: [facility] OR;  Service: Urology;  Laterality: N/A;    cassandra      TRANSRECTAL ULTRASOUND EXAMINATION N/A 9/29/2021    Procedure: ULTRASOUND, RECTAL APPROACH;  Surgeon: Ramona Skaggs Jr., MD;  Location: [facility] OR;  Service: Urology;  Laterality: N/A;    TRANSURETHRAL RESECTION OF PROSTATE N/A 10/26/2021    Procedure: TURP (TRANSURETHRAL RESECTION OF PROSTATE);  Surgeon: Ramona Skaggs Jr., MD;  Location: [facility] OR;  Service: Urology;  Laterality: N/A;     History reviewed. No pertinent family history.    Review of patient's allergies indicates:   Allergen Reactions    Macrobid [nitrofurantoin monohyd/m-cryst]     Iodinated contrast media      Betadine ok       Current Outpatient Medications:     acetaminophen-codeine 300-60mg (TYLENOL #4) 300-60 mg Tab, TAKE 1 TABLET BY MOUTH EVERY 12 HOURS AS NEEDED FOR KNEE PAIN, Disp: , Rfl:     benazepriL (LOTENSIN) 20 MG tablet, Take 20 mg by mouth once daily., Disp: , Rfl:     blood sugar diagnostic Strp, To check BG 3 times daily, to use  "with insurance preferred meter, Disp: 100 each, Rfl: 0    clobetasoL (TEMOVATE) 0.05 % external solution, APPLY TWICE A DAY TO HEAD WHEN ITCHING, Disp: , Rfl:     docusate sodium (COLACE) 100 MG capsule, Take 1 capsule (100 mg total) by mouth 2 (two) times daily., Disp: 60 capsule, Rfl: 2    lancets Misc, To check BG 3 times daily, to use with insurance preferred meter, Disp: 100 each, Rfl: 0    lovastatin (MEVACOR) 20 MG tablet, Take 1 tablet (20 mg total) by mouth every evening., Disp: 90 tablet, Rfl: 1    pen needle, diabetic 32 gauge x 5/32" Ndle, 1 each by Misc.(Non-Drug; Combo Route) route once daily at 6am., Disp: 100 each, Rfl: 3    warfarin (COUMADIN) 5 MG tablet, Take 2.5 mg by mouth once daily., Disp: , Rfl:     zolpidem (AMBIEN) 10 mg Tab, Take 1 tablet (10 mg total) by mouth nightly as needed (sleep)., Disp: 90 tablet, Rfl: 0    blood-glucose meter kit, To check BG 3 times daily, to use with insurance preferred meter, Disp: 1 each, Rfl: 0    furosemide (LASIX) 40 MG tablet, Take 1 tablet (40 mg total) by mouth daily as needed. (Patient not taking: No sig reported), Disp: 30 tablet, Rfl: 3    insulin glargine, TOUJEO, (TOUJEO SOLOSTAR U-300 INSULIN) 300 unit/mL (1.5 mL) InPn pen, Inject 24 Units into the skin once daily., Disp: 1 pen, Rfl: 0    metFORMIN (GLUCOPHAGE) 500 MG tablet, Take 1 tablet (500 mg total) by mouth 2 (two) times daily with meals. (Patient not taking: Reported on 6/28/2022), Disp: 180 tablet, Rfl: 3    polyethylene glycol (GLYCOLAX) 17 gram PwPk, Take 17 g by mouth once daily., Disp: 100 each, Rfl: 2    SSD 1 % cream, APPLY 1 APPLICATION TOPICALLY TWICE A DAY, Disp: , Rfl:     triamcinolone acetonide 0.1% (KENALOG) 0.1 % cream, APPLY 1 APPLICATION ON THE SKIN TWICE A DAY AS NEEDED DO NOT APPLY TO FACE, AXILLA OR GROIN, Disp: , Rfl:     Review of Systems   Constitutional: Negative for fatigue, fever and unexpected weight change.   Respiratory: Negative for cough and " "shortness of breath.    Cardiovascular: Negative for chest pain and leg swelling.   Gastrointestinal: Positive for constipation. Negative for abdominal pain, nausea and vomiting.   Genitourinary: Negative for dysuria, frequency and urgency.   Musculoskeletal: Negative for arthralgias.   Skin: Negative for rash.   Neurological: Negative for dizziness, weakness and headaches.   Psychiatric/Behavioral: Negative for sleep disturbance.          Objective:      Vitals:    06/28/22 1017   BP: 132/86   Pulse: 85   Resp: 19   SpO2: 99%   Weight: 98.9 kg (218 lb)   Height: 5' 11" (1.803 m)     Physical Exam  Vitals and nursing note reviewed.   Constitutional:       General: He is not in acute distress.     Appearance: Normal appearance. He is well-developed. He is not ill-appearing or diaphoretic.      Comments: Cane for ambulation   Eyes:      Pupils: Pupils are equal, round, and reactive to light.   Neck:      Trachea: No tracheal deviation.   Cardiovascular:      Rate and Rhythm: Normal rate and regular rhythm.      Heart sounds: No murmur heard.    No friction rub. No gallop.   Pulmonary:      Breath sounds: Normal breath sounds. No stridor. No wheezing or rales.   Abdominal:      Palpations: Abdomen is soft. There is no mass.      Tenderness: There is no abdominal tenderness.   Musculoskeletal:         General: No tenderness or deformity.      Cervical back: Neck supple.   Lymphadenopathy:      Cervical: No cervical adenopathy.   Skin:     General: Skin is warm and dry.   Neurological:      Mental Status: He is alert and oriented to person, place, and time.      Coordination: Coordination normal.   Psychiatric:         Thought Content: Thought content normal.           Assessment:       1. Stage 3a chronic kidney disease    2. Uncontrolled type 2 diabetes mellitus with hyperglycemia    3. Hypertension, unspecified type    4. Paroxysmal atrial fibrillation    5. Hyperlipidemia, unspecified hyperlipidemia type    6. " Coronary artery disease, unspecified vessel or lesion type, unspecified whether angina present, unspecified whether native or transplanted heart    7. Constipation, unspecified constipation type    8. Leukocytosis, unspecified type         Plan:       Stage 3a chronic kidney disease    Uncontrolled type 2 diabetes mellitus with hyperglycemia  Comments:  increase toujeo to 24 units  Orders:  -     insulin glargine, TOUJEO, (TOUJEO SOLOSTAR U-300 INSULIN) 300 unit/mL (1.5 mL) InPn pen; Inject 24 Units into the skin once daily.  Dispense: 1 pen; Refill: 0    Hypertension, unspecified type    Paroxysmal atrial fibrillation    Hyperlipidemia, unspecified hyperlipidemia type    Coronary artery disease, unspecified vessel or lesion type, unspecified whether angina present, unspecified whether native or transplanted heart    Constipation, unspecified constipation type  Comments:  add glycolax    Leukocytosis, unspecified type  Comments:  repeat cbc next week    Other orders  -     polyethylene glycol (GLYCOLAX) 17 gram PwPk; Take 17 g by mouth once daily.  Dispense: 100 each; Refill: 2      Follow up in about 2 weeks (around 7/12/2022), or if symptoms worsen or fail to improve, for medication management.        7/6/2022 Leana Perez

## 2022-07-25 ENCOUNTER — CLINICAL SUPPORT (OUTPATIENT)
Dept: FAMILY MEDICINE | Facility: CLINIC | Age: 87
End: 2022-07-25
Payer: MEDICARE

## 2022-07-25 NOTE — PROGRESS NOTES
Pt here today for a Nurse Visit to follow up on his diabetes. On 6/28/22 he was told to increase his Toujeo 20 24 units daily. He did so on 7/15. His readings since his last OV have been fluctuating. Patient was told to come back in 2 weeks for a OV and continue his current regimen. Appt is scheduled for 8/8.

## 2022-08-08 ENCOUNTER — OFFICE VISIT (OUTPATIENT)
Dept: FAMILY MEDICINE | Facility: CLINIC | Age: 87
End: 2022-08-08
Payer: MEDICARE

## 2022-08-08 VITALS
HEIGHT: 71 IN | WEIGHT: 223 LBS | DIASTOLIC BLOOD PRESSURE: 68 MMHG | BODY MASS INDEX: 31.22 KG/M2 | SYSTOLIC BLOOD PRESSURE: 118 MMHG | HEART RATE: 76 BPM

## 2022-08-08 DIAGNOSIS — I48.0 PAROXYSMAL ATRIAL FIBRILLATION: ICD-10-CM

## 2022-08-08 DIAGNOSIS — I10 HYPERTENSION, UNSPECIFIED TYPE: ICD-10-CM

## 2022-08-08 DIAGNOSIS — E11.65 UNCONTROLLED TYPE 2 DIABETES MELLITUS WITH HYPERGLYCEMIA: ICD-10-CM

## 2022-08-08 DIAGNOSIS — I25.10 CORONARY ARTERY DISEASE, UNSPECIFIED VESSEL OR LESION TYPE, UNSPECIFIED WHETHER ANGINA PRESENT, UNSPECIFIED WHETHER NATIVE OR TRANSPLANTED HEART: ICD-10-CM

## 2022-08-08 LAB — HBA1C MFR BLD: 8.1 %

## 2022-08-08 PROCEDURE — 99214 PR OFFICE/OUTPT VISIT, EST, LEVL IV, 30-39 MIN: ICD-10-PCS | Mod: S$GLB,,, | Performed by: NURSE PRACTITIONER

## 2022-08-08 PROCEDURE — 99214 OFFICE O/P EST MOD 30 MIN: CPT | Mod: S$GLB,,, | Performed by: NURSE PRACTITIONER

## 2022-08-08 PROCEDURE — 83036 HEMOGLOBIN GLYCOSYLATED A1C: CPT | Mod: QW,,, | Performed by: NURSE PRACTITIONER

## 2022-08-08 PROCEDURE — 83036 POCT HEMOGLOBIN A1C: ICD-10-PCS | Mod: QW,,, | Performed by: NURSE PRACTITIONER

## 2022-08-09 LAB
ALBUMIN/CREAT UR: 637 MCG/MG CREAT
BASOPHILS # BLD AUTO: 88 CELLS/UL (ref 0–200)
BASOPHILS NFR BLD AUTO: 1.2 %
CREAT UR-MCNC: 27 MG/DL (ref 20–320)
EOSINOPHIL # BLD AUTO: 212 CELLS/UL (ref 15–500)
EOSINOPHIL NFR BLD AUTO: 2.9 %
ERYTHROCYTE [DISTWIDTH] IN BLOOD BY AUTOMATED COUNT: 14.3 % (ref 11–15)
HCT VFR BLD AUTO: 36.5 % (ref 38.5–50)
HGB BLD-MCNC: 11.3 G/DL (ref 13.2–17.1)
LYMPHOCYTES # BLD AUTO: 2059 CELLS/UL (ref 850–3900)
LYMPHOCYTES NFR BLD AUTO: 28.2 %
MCH RBC QN AUTO: 25.9 PG (ref 27–33)
MCHC RBC AUTO-ENTMCNC: 31 G/DL (ref 32–36)
MCV RBC AUTO: 83.7 FL (ref 80–100)
MICROALBUMIN UR-MCNC: 17.2 MG/DL
MONOCYTES # BLD AUTO: 832 CELLS/UL (ref 200–950)
MONOCYTES NFR BLD AUTO: 11.4 %
NEUTROPHILS # BLD AUTO: 4110 CELLS/UL (ref 1500–7800)
NEUTROPHILS NFR BLD AUTO: 56.3 %
PLATELET # BLD AUTO: 390 THOUSAND/UL (ref 140–400)
PMV BLD REES-ECKER: 9.2 FL (ref 7.5–12.5)
RBC # BLD AUTO: 4.36 MILLION/UL (ref 4.2–5.8)
WBC # BLD AUTO: 7.3 THOUSAND/UL (ref 3.8–10.8)

## 2022-08-10 ENCOUNTER — TELEPHONE (OUTPATIENT)
Dept: FAMILY MEDICINE | Facility: CLINIC | Age: 87
End: 2022-08-10

## 2022-08-31 ENCOUNTER — TELEPHONE (OUTPATIENT)
Dept: FAMILY MEDICINE | Facility: CLINIC | Age: 87
End: 2022-08-31

## 2022-08-31 DIAGNOSIS — Z79.899 HIGH RISK MEDICATION USE: ICD-10-CM

## 2022-08-31 DIAGNOSIS — E78.5 HYPERLIPIDEMIA, UNSPECIFIED HYPERLIPIDEMIA TYPE: ICD-10-CM

## 2022-08-31 RX ORDER — BENAZEPRIL HYDROCHLORIDE 20 MG/1
20 TABLET ORAL DAILY
Qty: 90 TABLET | Refills: 1 | Status: SHIPPED | OUTPATIENT
Start: 2022-08-31 | End: 2023-06-22

## 2022-08-31 RX ORDER — LOVASTATIN 20 MG/1
20 TABLET ORAL NIGHTLY
Qty: 90 TABLET | Refills: 1 | Status: SHIPPED | OUTPATIENT
Start: 2022-08-31 | End: 2023-06-22 | Stop reason: SDUPTHER

## 2022-08-31 NOTE — TELEPHONE ENCOUNTER
----- Message from Yuri Law MA sent at 8/31/2022  8:33 AM CDT -----  Pt calling for a refill on his lovastatin. # [phone]

## 2022-08-31 NOTE — TELEPHONE ENCOUNTER
----- Message from Kyleigh Quintana sent at 8/31/2022  8:29 AM CDT -----  Patient called and stated that he need a refill of his benazepril called into [facility] on [address] if any questions please give him a call at [phone]

## 2022-10-06 ENCOUNTER — OFFICE VISIT (OUTPATIENT)
Dept: FAMILY MEDICINE | Facility: CLINIC | Age: 87
End: 2022-10-06
Payer: MEDICARE

## 2022-10-06 VITALS
DIASTOLIC BLOOD PRESSURE: 64 MMHG | SYSTOLIC BLOOD PRESSURE: 124 MMHG | BODY MASS INDEX: 32.62 KG/M2 | HEIGHT: 71 IN | WEIGHT: 233 LBS | HEART RATE: 76 BPM

## 2022-10-06 DIAGNOSIS — E78.5 HYPERLIPIDEMIA, UNSPECIFIED HYPERLIPIDEMIA TYPE: ICD-10-CM

## 2022-10-06 DIAGNOSIS — N18.31 STAGE 3A CHRONIC KIDNEY DISEASE: ICD-10-CM

## 2022-10-06 DIAGNOSIS — K59.00 CONSTIPATION, UNSPECIFIED CONSTIPATION TYPE: Primary | ICD-10-CM

## 2022-10-06 DIAGNOSIS — I10 HYPERTENSION, UNSPECIFIED TYPE: ICD-10-CM

## 2022-10-06 DIAGNOSIS — Z23 NEED FOR INFLUENZA VACCINATION: Primary | ICD-10-CM

## 2022-10-06 DIAGNOSIS — I48.0 PAROXYSMAL ATRIAL FIBRILLATION: ICD-10-CM

## 2022-10-06 DIAGNOSIS — Z79.899 HIGH RISK MEDICATION USE: ICD-10-CM

## 2022-10-06 DIAGNOSIS — E11.65 UNCONTROLLED TYPE 2 DIABETES MELLITUS WITH HYPERGLYCEMIA: ICD-10-CM

## 2022-10-06 DIAGNOSIS — I25.10 CORONARY ARTERY DISEASE, UNSPECIFIED VESSEL OR LESION TYPE, UNSPECIFIED WHETHER ANGINA PRESENT, UNSPECIFIED WHETHER NATIVE OR TRANSPLANTED HEART: ICD-10-CM

## 2022-10-06 LAB — HBA1C MFR BLD: 7.5 %

## 2022-10-06 PROCEDURE — 99214 OFFICE O/P EST MOD 30 MIN: CPT | Mod: 25,S$GLB,, | Performed by: NURSE PRACTITIONER

## 2022-10-06 PROCEDURE — 90662 FLU VACCINE - QUADRIVALENT - HIGH DOSE (65+) PRESERVATIVE FREE IM: ICD-10-PCS | Mod: S$GLB,,, | Performed by: NURSE PRACTITIONER

## 2022-10-06 PROCEDURE — 83036 POCT HEMOGLOBIN A1C: ICD-10-PCS | Mod: QW,,, | Performed by: NURSE PRACTITIONER

## 2022-10-06 PROCEDURE — G0008 ADMIN INFLUENZA VIRUS VAC: HCPCS | Mod: S$GLB,,, | Performed by: NURSE PRACTITIONER

## 2022-10-06 PROCEDURE — 99214 PR OFFICE/OUTPT VISIT, EST, LEVL IV, 30-39 MIN: ICD-10-PCS | Mod: 25,S$GLB,, | Performed by: NURSE PRACTITIONER

## 2022-10-06 PROCEDURE — 83036 HEMOGLOBIN GLYCOSYLATED A1C: CPT | Mod: QW,,, | Performed by: NURSE PRACTITIONER

## 2022-10-06 PROCEDURE — G0008 FLU VACCINE - QUADRIVALENT - HIGH DOSE (65+) PRESERVATIVE FREE IM: ICD-10-PCS | Mod: S$GLB,,, | Performed by: NURSE PRACTITIONER

## 2022-10-06 PROCEDURE — 90662 IIV NO PRSV INCREASED AG IM: CPT | Mod: S$GLB,,, | Performed by: NURSE PRACTITIONER

## 2022-10-06 NOTE — TELEPHONE ENCOUNTER
----- Message from Yuri Law MA sent at 10/6/2022  2:12 PM CDT -----  Pt is calling for a refill on his linzess. [phone]

## 2022-10-06 NOTE — PROGRESS NOTES
SUBJECTIVE:    Patient ID: Alex Hatch is a 87 y.o. male.    Chief Complaint: Diabetes (2mth, no bottles, Flu wants// SW)    87-year-old male presents for check up.  Treated for htn, hyperlipidemia, atrial fibrillation, cad, ckd, dm2 and bph. He is following up for uncontrolled diabetes. Today a1c is down to 7.5mg/dl. this is down from 8.1mg/dl. taking meds as prescribed. Does not watch diet  . Inr is monitored by dr. Lux. Scheduled for check today. Overall doing okay.  Last labs were 8/8      Office Visit on 10/06/2022   Component Date Value Ref Range Status    Hemoglobin A1C 10/06/2022 7.5  % Final   Office Visit on 08/08/2022   Component Date Value Ref Range Status    Hemoglobin A1C 08/08/2022 8.1  % Final    Creatinine, Urine 08/08/2022 27  20 - 320 mg/dL Final    Microalb, Ur 08/08/2022 17.2  See Note: mg/dL Final    Microalb/Creat Ratio 08/08/2022 637 (H)  <30 mcg/mg creat Final   Office Visit on 06/14/2022   Component Date Value Ref Range Status    WBC 08/08/2022 7.3  3.8 - 10.8 Thousand/uL Final    RBC 08/08/2022 4.36  4.20 - 5.80 Million/uL Final    Hemoglobin 08/08/2022 11.3 (L)  13.2 - 17.1 g/dL Final    Hematocrit 08/08/2022 36.5 (L)  38.5 - 50.0 % Final    MCV 08/08/2022 83.7  80.0 - 100.0 fL Final    MCH 08/08/2022 25.9 (L)  27.0 - 33.0 pg Final    MCHC 08/08/2022 31.0 (L)  32.0 - 36.0 g/dL Final    RDW 08/08/2022 14.3  11.0 - 15.0 % Final    Platelets 08/08/2022 390  140 - 400 Thousand/uL Final    MPV 08/08/2022 9.2  7.5 - 12.5 fL Final    Neutrophils, Abs 08/08/2022 4,110  1,500 - 7,800 cells/uL Final    Lymph # 08/08/2022 2,059  850 - 3,900 cells/uL Final    Mono # 08/08/2022 832  200 - 950 cells/uL Final    Eos # 08/08/2022 212  15 - 500 cells/uL Final    Baso # 08/08/2022 88  0 - 200 cells/uL Final    Neutrophils Relative 08/08/2022 56.3  % Final    Lymph % 08/08/2022 28.2  % Final    Mono % 08/08/2022 11.4  % Final    Eosinophil % 08/08/2022 2.9  % Final    Basophil % 08/08/2022 1.2  %  Final   Anti-coag visit on 06/06/2022   Component Date Value Ref Range Status    INR 06/06/2022 4.6   Final   Office Visit on 06/06/2022   Component Date Value Ref Range Status    WBC 06/06/2022 13.6 (H)  3.8 - 10.8 Thousand/uL Final    RBC 06/06/2022 4.58  4.20 - 5.80 Million/uL Final    Hemoglobin 06/06/2022 12.1 (L)  13.2 - 17.1 g/dL Final    Hematocrit 06/06/2022 38.2 (L)  38.5 - 50.0 % Final    MCV 06/06/2022 83.4  80.0 - 100.0 fL Final    MCH 06/06/2022 26.4 (L)  27.0 - 33.0 pg Final    MCHC 06/06/2022 31.7 (L)  32.0 - 36.0 g/dL Final    RDW 06/06/2022 13.5  11.0 - 15.0 % Final    Platelets 06/06/2022 377  140 - 400 Thousand/uL Final    MPV 06/06/2022 10.2  7.5 - 12.5 fL Final    Neutrophils, Abs 06/06/2022 10,622 (H)  1,500 - 7,800 cells/uL Final    Lymph # 06/06/2022 1,550  850 - 3,900 cells/uL Final    Mono # 06/06/2022 1,129 (H)  200 - 950 cells/uL Final    Eos # 06/06/2022 204  15 - 500 cells/uL Final    Baso # 06/06/2022 95  0 - 200 cells/uL Final    Neutrophils Relative 06/06/2022 78.1  % Final    Lymph % 06/06/2022 11.4  % Final    Mono % 06/06/2022 8.3  % Final    Eosinophil % 06/06/2022 1.5  % Final    Basophil % 06/06/2022 0.7  % Final    Hep A IgM 06/06/2022 NON-REACTIVE  NON-REACTIVE Final    Comment 06/06/2022 For additional information, please refer to http://education.Grow[employer]/faq/GQH222 (This link is being provided for informational/ educational purposes only.)   Final    Hepatitis B Surface Ag 06/06/2022 NON-REACTIVE  NON-REACTIVE Final    Hep B C IgM 06/06/2022 NON-REACTIVE  NON-REACTIVE Final    Hepatitis C Ab 06/06/2022 NON-REACTIVE  NON-REACTIVE Final    Signal/Cutoff 06/06/2022 0.06  <1.00 Final    Glucose 06/06/2022 231 (H)  65 - 99 mg/dL Final    BUN 06/06/2022 22  7 - 25 mg/dL Final    Creatinine 06/06/2022 1.84 (H)  0.70 - 1.11 mg/dL Final    eGFR if non  06/06/2022 33 (L)  > OR = 60 mL/min/1.73m2 Final    eGFR if  06/06/2022 38 (L)  > OR =  60 mL/min/1.73m2 Final    BUN/Creatinine Ratio 06/06/2022 12  6 - 22 (calc) Final    Sodium 06/06/2022 136  135 - 146 mmol/L Final    Potassium 06/06/2022 4.0  3.5 - 5.3 mmol/L Final    Chloride 06/06/2022 97 (L)  98 - 110 mmol/L Final    CO2 06/06/2022 26  20 - 32 mmol/L Final    Calcium 06/06/2022 8.4 (L)  8.6 - 10.3 mg/dL Final    Total Protein 06/06/2022 7.1  6.1 - 8.1 g/dL Final    Albumin 06/06/2022 3.1 (L)  3.6 - 5.1 g/dL Final    Globulin, Total 06/06/2022 4.0 (H)  1.9 - 3.7 g/dL (calc) Final    Albumin/Globulin Ratio 06/06/2022 0.8 (L)  1.0 - 2.5 (calc) Final    Total Bilirubin 06/06/2022 0.9  0.2 - 1.2 mg/dL Final    Alkaline Phosphatase 06/06/2022 91  35 - 144 U/L Final    AST 06/06/2022 13  10 - 35 U/L Final    ALT 06/06/2022 11  9 - 46 U/L Final   Office Visit on 06/03/2022   Component Date Value Ref Range Status    Urine Culture, Routine 06/03/2022  (A)   Final                    Value:CITROBACTER KOSERI  10,000 - 49,999 cfu/ml     Lab Visit on 06/02/2022   Component Date Value Ref Range Status    Sodium 06/02/2022 135 (L)  136 - 145 mmol/L Final    Potassium 06/02/2022 4.0  3.5 - 5.1 mmol/L Final    Chloride 06/02/2022 99  95 - 110 mmol/L Final    CO2 06/02/2022 25  23 - 29 mmol/L Final    Glucose 06/02/2022 252 (H)  70 - 110 mg/dL Final    BUN 06/02/2022 30 (H)  8 - 23 mg/dL Final    Creatinine 06/02/2022 2.0 (H)  0.5 - 1.4 mg/dL Final    Calcium 06/02/2022 8.4 (L)  8.7 - 10.5 mg/dL Final    Total Protein 06/02/2022 7.6  6.0 - 8.4 g/dL Final    Albumin 06/02/2022 2.9 (L)  3.5 - 5.2 g/dL Final    Total Bilirubin 06/02/2022 0.8  0.1 - 1.0 mg/dL Final    Alkaline Phosphatase 06/02/2022 90  55 - 135 U/L Final    AST 06/02/2022 18  10 - 40 U/L Final    ALT 06/02/2022 17  10 - 44 U/L Final    Anion Gap 06/02/2022 11  8 - 16 mmol/L Final    eGFR if African American 06/02/2022 34.4 (A)  >60 mL/min/1.73 m^2 Final    eGFR if non African American 06/02/2022 29.8 (A)  >60 mL/min/1.73 m^2 Final    WBC 06/02/2022  17.40 (H)  3.90 - 12.70 K/uL Final    RBC 06/02/2022 4.63  4.60 - 6.20 M/uL Final    Hemoglobin 06/02/2022 12.2 (L)  14.0 - 18.0 g/dL Final    Hematocrit 06/02/2022 38.5 (L)  40.0 - 54.0 % Final    MCV 06/02/2022 83  82 - 98 fL Final    MCH 06/02/2022 26.3 (L)  27.0 - 31.0 pg Final    MCHC 06/02/2022 31.7 (L)  32.0 - 36.0 g/dL Final    RDW 06/02/2022 14.5  11.5 - 14.5 % Final    Platelets 06/02/2022 323  150 - 450 K/uL Final    MPV 06/02/2022 9.9  9.2 - 12.9 fL Final    Immature Granulocytes 06/02/2022 1.1 (H)  0.0 - 0.5 % Final    Gran # (ANC) 06/02/2022 14.3 (H)  1.8 - 7.7 K/uL Final    Immature Grans (Abs) 06/02/2022 0.20 (H)  0.00 - 0.04 K/uL Final    Lymph # 06/02/2022 1.5  1.0 - 4.8 K/uL Final    Mono # 06/02/2022 1.2 (H)  0.3 - 1.0 K/uL Final    Eos # 06/02/2022 0.2  0.0 - 0.5 K/uL Final    Baso # 06/02/2022 0.08  0.00 - 0.20 K/uL Final    nRBC 06/02/2022 0  0 /100 WBC Final    Gran % 06/02/2022 82.3 (H)  38.0 - 73.0 % Final    Lymph % 06/02/2022 8.3 (L)  18.0 - 48.0 % Final    Mono % 06/02/2022 6.8  4.0 - 15.0 % Final    Eosinophil % 06/02/2022 1.0  0.0 - 8.0 % Final    Basophil % 06/02/2022 0.5  0.0 - 1.9 % Final    Differential Method 06/02/2022 Automated   Final    Magnesium 06/02/2022 1.8  1.6 - 2.6 mg/dL Final    Hemoglobin A1C 06/02/2022 13.9 (H)  4.5 - 6.2 % Final    Estimated Avg Glucose 06/02/2022 352 (H)  68 - 131 mg/dL Final    PT 06/02/2022 69.2 (H)  11.4 - 13.7 sec Final    INR 06/02/2022 8.9 (HH)   Final       Past Medical History:   Diagnosis Date    Atrial fibrillation     Dr Lux    CKD (chronic kidney disease), stage III     Coronary artery disease     Diabetes mellitus, type 2     Hyperlipidemia     Hypertension      Social History     Socioeconomic History    Marital status:    Tobacco Use    Smoking status: Never    Smokeless tobacco: Never   Substance and Sexual Activity    Alcohol use: No    Drug use: No     Past Surgical History:   Procedure Laterality Date    CARDIAC  PACEMAKER PLACEMENT  5/2013    Dr Lux    COLONOSCOPY W/ POLYPECTOMY  2011    Dr Mar, repeat in 3 years    CORONARY ARTERY BYPASS GRAFT  1993    CYSTOSCOPY N/A 9/29/2021    Procedure: CYSTOSCOPY;  Surgeon: Ramona Skaggs Jr., MD;  Location: [facility] OR;  Service: Urology;  Laterality: N/A;    cassandra      TRANSRECTAL ULTRASOUND EXAMINATION N/A 9/29/2021    Procedure: ULTRASOUND, RECTAL APPROACH;  Surgeon: Ramona Skaggs Jr., MD;  Location: [facility] OR;  Service: Urology;  Laterality: N/A;    TRANSURETHRAL RESECTION OF PROSTATE N/A 10/26/2021    Procedure: TURP (TRANSURETHRAL RESECTION OF PROSTATE);  Surgeon: Ramona Skaggs Jr., MD;  Location: [facility] OR;  Service: Urology;  Laterality: N/A;     History reviewed. No pertinent family history.    Review of patient's allergies indicates:   Allergen Reactions    Macrobid [nitrofurantoin monohyd/m-cryst]     Iodinated contrast media      Betadine ok       Current Outpatient Medications:     acetaminophen-codeine 300-60mg (TYLENOL #4) 300-60 mg Tab, TAKE 1 TABLET BY MOUTH EVERY 12 HOURS AS NEEDED FOR KNEE PAIN, Disp: , Rfl:     benazepriL (LOTENSIN) 20 MG tablet, Take 1 tablet (20 mg total) by mouth once daily., Disp: 90 tablet, Rfl: 1    blood sugar diagnostic Strp, To check BG 3 times daily, to use with insurance preferred meter, Disp: 100 each, Rfl: 0    blood-glucose meter kit, To check BG 3 times daily, to use with insurance preferred meter, Disp: 1 each, Rfl: 0    clobetasoL (TEMOVATE) 0.05 % external solution, APPLY TWICE A DAY TO HEAD WHEN ITCHING, Disp: , Rfl:     docusate sodium (COLACE) 100 MG capsule, Take 1 capsule (100 mg total) by mouth 2 (two) times daily., Disp: 60 capsule, Rfl: 2    furosemide (LASIX) 40 MG tablet, Take 1 tablet (40 mg total) by mouth daily as needed. (Patient not taking: No sig reported), Disp: 30 tablet, Rfl: 3    insulin glargine, TOUJEO, (TOUJEO SOLOSTAR U-300 INSULIN) 300 unit/mL (1.5 mL) InPn pen, Inject 24 Units into the skin once daily.,  "Disp: 1 pen, Rfl: 0    lancets Misc, To check BG 3 times daily, to use with insurance preferred meter, Disp: 100 each, Rfl: 0    linaCLOtide (LINZESS) 145 mcg Cap capsule, Take 1 capsule (145 mcg total) by mouth before breakfast., Disp: 90 capsule, Rfl: 1    lovastatin (MEVACOR) 20 MG tablet, Take 1 tablet (20 mg total) by mouth every evening., Disp: 90 tablet, Rfl: 1    metFORMIN (GLUCOPHAGE) 500 MG tablet, Take 1 tablet (500 mg total) by mouth 2 (two) times daily with meals. (Patient not taking: Reported on 6/28/2022), Disp: 180 tablet, Rfl: 3    pen needle, diabetic 32 gauge x 5/32" Ndle, 1 each by Misc.(Non-Drug; Combo Route) route once daily at 6am., Disp: 100 each, Rfl: 3    polyethylene glycol (GLYCOLAX) 17 gram PwPk, Take 17 g by mouth once daily., Disp: 100 each, Rfl: 2    SSD 1 % cream, APPLY 1 APPLICATION TOPICALLY TWICE A DAY, Disp: , Rfl:     triamcinolone acetonide 0.1% (KENALOG) 0.1 % cream, APPLY 1 APPLICATION ON THE SKIN TWICE A DAY AS NEEDED DO NOT APPLY TO FACE, AXILLA OR GROIN, Disp: , Rfl:     warfarin (COUMADIN) 5 MG tablet, Take 2.5 mg by mouth once daily., Disp: , Rfl:     zolpidem (AMBIEN) 10 mg Tab, Take 1 tablet (10 mg total) by mouth nightly as needed (sleep)., Disp: 90 tablet, Rfl: 0    Review of Systems   Constitutional:  Negative for fatigue, fever and unexpected weight change.   HENT:  Negative for ear pain, sinus pressure and sore throat.    Eyes:  Negative for pain.   Respiratory:  Negative for cough and shortness of breath.    Cardiovascular:  Negative for chest pain and leg swelling.   Gastrointestinal:  Negative for abdominal pain, constipation, nausea and vomiting.   Genitourinary:  Negative for dysuria, frequency and urgency.   Musculoskeletal:  Negative for arthralgias.   Skin:  Negative for rash.   Neurological:  Negative for dizziness, weakness and headaches.   Psychiatric/Behavioral:  Negative for sleep disturbance.         Objective:      Vitals:    10/06/22 0921   BP: " "124/64   Pulse: 76   Weight: 105.7 kg (233 lb)   Height: 5' 11" (1.803 m)     Physical Exam  Vitals and nursing note reviewed.   Constitutional:       General: He is not in acute distress.     Appearance: Normal appearance. He is well-developed.      Comments: cane   HENT:      Right Ear: External ear normal.      Left Ear: External ear normal.   Eyes:      Pupils: Pupils are equal, round, and reactive to light.   Neck:      Trachea: No tracheal deviation.   Cardiovascular:      Rate and Rhythm: Normal rate. Rhythm irregular.      Heart sounds: No murmur heard.    No friction rub. No gallop.   Pulmonary:      Breath sounds: Normal breath sounds. No stridor. No wheezing or rales.   Abdominal:      Palpations: Abdomen is soft. There is no mass.      Tenderness: There is no abdominal tenderness.   Musculoskeletal:         General: No tenderness or deformity.      Cervical back: Neck supple.   Feet:      Right foot:      Protective Sensation: 5 sites tested.  5 sites sensed.      Left foot:      Protective Sensation: 5 sites tested.  5 sites sensed.   Lymphadenopathy:      Cervical: No cervical adenopathy.   Skin:     General: Skin is warm and dry.   Neurological:      Mental Status: He is alert and oriented to person, place, and time.      Coordination: Coordination normal.   Psychiatric:         Thought Content: Thought content normal.         Assessment:       1. Need for influenza vaccination    2. Uncontrolled type 2 diabetes mellitus with hyperglycemia    3. Stage 3a chronic kidney disease    4. Hypertension, unspecified type    5. Hyperlipidemia, unspecified hyperlipidemia type    6. Coronary artery disease, unspecified vessel or lesion type, unspecified whether angina present, unspecified whether native or transplanted heart    7. Paroxysmal atrial fibrillation    8. High risk medication use           Plan:       Need for influenza vaccination  -     Influenza - Quadrivalent - High Dose (65+) (PF) " (IM)    Uncontrolled type 2 diabetes mellitus with hyperglycemia  -     Hemoglobin A1C, POCT    Stage 3a chronic kidney disease    Hypertension, unspecified type    Hyperlipidemia, unspecified hyperlipidemia type    Coronary artery disease, unspecified vessel or lesion type, unspecified whether angina present, unspecified whether native or transplanted heart    Paroxysmal atrial fibrillation    High risk medication use    Follow up in about 3 months (around 1/6/2023), or if symptoms worsen or fail to improve, for medication management.        10/16/2022 Leana Perez

## 2022-10-17 DIAGNOSIS — G47.00 INSOMNIA, UNSPECIFIED TYPE: ICD-10-CM

## 2022-10-17 RX ORDER — ZOLPIDEM TARTRATE 10 MG/1
10 TABLET ORAL NIGHTLY PRN
Qty: 90 TABLET | Refills: 0 | Status: SHIPPED | OUTPATIENT
Start: 2022-10-17 | End: 2023-01-20 | Stop reason: SDUPTHER

## 2022-10-17 NOTE — TELEPHONE ENCOUNTER
----- Message from Kita Alvarez MA sent at 10/17/2022 12:48 PM CDT -----  Regarding: rx not sent  Leana was supposed to send rx for ambien upon last visit. It hasn't been sent  [facility] [facility] [address]  [phone]

## 2022-11-03 DIAGNOSIS — E11.65 UNCONTROLLED TYPE 2 DIABETES MELLITUS WITH HYPERGLYCEMIA: ICD-10-CM

## 2022-11-03 RX ORDER — INSULIN GLARGINE 300 U/ML
24 INJECTION, SOLUTION SUBCUTANEOUS DAILY
Qty: 2 PEN | Refills: 0 | Status: SHIPPED | OUTPATIENT
Start: 2022-11-03 | End: 2023-02-22 | Stop reason: SDUPTHER

## 2022-11-03 NOTE — TELEPHONE ENCOUNTER
----- Message from Kyleigh Quintana sent at 11/3/2022 12:38 PM CDT -----  Patient called and stated that he need a refill of his insulin called into [facility] on [address] if any questions please give him a call back at [phone]

## 2023-01-03 ENCOUNTER — OFFICE VISIT (OUTPATIENT)
Dept: FAMILY MEDICINE | Facility: CLINIC | Age: 88
End: 2023-01-03
Payer: MEDICARE

## 2023-01-03 VITALS
SYSTOLIC BLOOD PRESSURE: 128 MMHG | WEIGHT: 242 LBS | HEART RATE: 80 BPM | BODY MASS INDEX: 33.88 KG/M2 | HEIGHT: 71 IN | DIASTOLIC BLOOD PRESSURE: 70 MMHG

## 2023-01-03 DIAGNOSIS — M19.90 OSTEOARTHRITIS, UNSPECIFIED OSTEOARTHRITIS TYPE, UNSPECIFIED SITE: ICD-10-CM

## 2023-01-03 DIAGNOSIS — I25.10 CORONARY ARTERY DISEASE, UNSPECIFIED VESSEL OR LESION TYPE, UNSPECIFIED WHETHER ANGINA PRESENT, UNSPECIFIED WHETHER NATIVE OR TRANSPLANTED HEART: ICD-10-CM

## 2023-01-03 DIAGNOSIS — I10 HYPERTENSION, UNSPECIFIED TYPE: ICD-10-CM

## 2023-01-03 DIAGNOSIS — E78.5 HYPERLIPIDEMIA, UNSPECIFIED HYPERLIPIDEMIA TYPE: ICD-10-CM

## 2023-01-03 DIAGNOSIS — I48.0 PAROXYSMAL ATRIAL FIBRILLATION: ICD-10-CM

## 2023-01-03 DIAGNOSIS — E11.65 UNCONTROLLED TYPE 2 DIABETES MELLITUS WITH HYPERGLYCEMIA: Primary | ICD-10-CM

## 2023-01-03 DIAGNOSIS — Z79.899 HIGH RISK MEDICATION USE: ICD-10-CM

## 2023-01-03 LAB — HBA1C MFR BLD: 8.4 %

## 2023-01-03 PROCEDURE — 83036 HEMOGLOBIN GLYCOSYLATED A1C: CPT | Mod: QW,,, | Performed by: NURSE PRACTITIONER

## 2023-01-03 PROCEDURE — 99214 OFFICE O/P EST MOD 30 MIN: CPT | Mod: S$GLB,,, | Performed by: NURSE PRACTITIONER

## 2023-01-03 PROCEDURE — 83036 POCT HEMOGLOBIN A1C: ICD-10-PCS | Mod: QW,,, | Performed by: NURSE PRACTITIONER

## 2023-01-03 PROCEDURE — 99214 PR OFFICE/OUTPT VISIT, EST, LEVL IV, 30-39 MIN: ICD-10-PCS | Mod: S$GLB,,, | Performed by: NURSE PRACTITIONER

## 2023-01-03 NOTE — PROGRESS NOTES
SUBJECTIVE:    Patient ID: Alex Hatch is a 87 y.o. male.    Chief Complaint: Follow-up (3mth, no bottles// SW)    87-year-old male presents for check up.  Treated for htn, hyperlipidemia, atrial fibrillation, cad, ckd, dm2 and bph. He is following up for uncontrolled diabetes. Today a1c is up to 8.4mg/dl he reports not watching diet as much. Wife is unable to cook. Sees dr. Lux regularly. Inr is monitored at his office. Using cane for ambulation. Due for labs.     Office Visit on 01/03/2023   Component Date Value Ref Range Status    Hemoglobin A1C, POC 01/03/2023 8.4  % Final   Office Visit on 10/06/2022   Component Date Value Ref Range Status    Hemoglobin A1C, POC 10/06/2022 7.5  % Final   Office Visit on 08/08/2022   Component Date Value Ref Range Status    Hemoglobin A1C, POC 08/08/2022 8.1  % Final    Creatinine, Urine 08/08/2022 27  20 - 320 mg/dL Final    Microalb, Ur 08/08/2022 17.2  See Note: mg/dL Final    Microalb/Creat Ratio 08/08/2022 637 (H)  <30 mcg/mg creat Final       Past Medical History:   Diagnosis Date    Atrial fibrillation     Dr Lux    CKD (chronic kidney disease), stage III     Coronary artery disease     Diabetes mellitus, type 2     Hyperlipidemia     Hypertension      Social History     Socioeconomic History    Marital status:    Tobacco Use    Smoking status: Never    Smokeless tobacco: Never   Substance and Sexual Activity    Alcohol use: No    Drug use: No     Past Surgical History:   Procedure Laterality Date    CARDIAC PACEMAKER PLACEMENT  5/2013    Dr Lux    COLONOSCOPY W/ POLYPECTOMY  2011    Dr Mar, repeat in 3 years    CORONARY ARTERY BYPASS GRAFT  1993    CYSTOSCOPY N/A 9/29/2021    Procedure: CYSTOSCOPY;  Surgeon: Ramona Skaggs Jr., MD;  Location: [facility] OR;  Service: Urology;  Laterality: N/A;    cassandra      TRANSRECTAL ULTRASOUND EXAMINATION N/A 9/29/2021    Procedure: ULTRASOUND, RECTAL APPROACH;  Surgeon: Ramona Skaggs Jr., MD;  Location: [facility] OR;   Service: Urology;  Laterality: N/A;    TRANSURETHRAL RESECTION OF PROSTATE N/A 10/26/2021    Procedure: TURP (TRANSURETHRAL RESECTION OF PROSTATE);  Surgeon: Ramona Skaggs Jr., MD;  Location: [facility];  Service: Urology;  Laterality: N/A;     History reviewed. No pertinent family history.    Review of patient's allergies indicates:   Allergen Reactions    Macrobid [nitrofurantoin monohyd/m-cryst]     Iodinated contrast media      Betadine ok       Current Outpatient Medications:     acetaminophen-codeine 300-60mg (TYLENOL #4) 300-60 mg Tab, TAKE 1 TABLET BY MOUTH EVERY 12 HOURS AS NEEDED FOR KNEE PAIN, Disp: , Rfl:     benazepriL (LOTENSIN) 20 MG tablet, Take 1 tablet (20 mg total) by mouth once daily., Disp: 90 tablet, Rfl: 1    blood sugar diagnostic Strp, To check BG 3 times daily, to use with insurance preferred meter, Disp: 100 each, Rfl: 0    blood-glucose meter kit, To check BG 3 times daily, to use with insurance preferred meter, Disp: 1 each, Rfl: 0    clobetasoL (TEMOVATE) 0.05 % external solution, APPLY TWICE A DAY TO HEAD WHEN ITCHING, Disp: , Rfl:     docusate sodium (COLACE) 100 MG capsule, Take 1 capsule (100 mg total) by mouth 2 (two) times daily., Disp: 60 capsule, Rfl: 2    furosemide (LASIX) 40 MG tablet, Take 1 tablet (40 mg total) by mouth daily as needed. (Patient not taking: No sig reported), Disp: 30 tablet, Rfl: 3    insulin glargine, TOUJEO, (TOUJEO SOLOSTAR U-300 INSULIN) 300 unit/mL (1.5 mL) InPn pen, Inject 24 Units into the skin once daily., Disp: 2 pen, Rfl: 0    lancets Misc, To check BG 3 times daily, to use with insurance preferred meter, Disp: 100 each, Rfl: 0    linaCLOtide (LINZESS) 145 mcg Cap capsule, Take 1 capsule (145 mcg total) by mouth before breakfast., Disp: 90 capsule, Rfl: 1    lovastatin (MEVACOR) 20 MG tablet, Take 1 tablet (20 mg total) by mouth every evening., Disp: 90 tablet, Rfl: 1    metFORMIN (GLUCOPHAGE) 500 MG tablet, Take 1 tablet (500 mg total) by mouth 2  "(two) times daily with meals. (Patient not taking: Reported on 6/28/2022), Disp: 180 tablet, Rfl: 3    pen needle, diabetic 32 gauge x 5/32" Ndle, 1 each by Misc.(Non-Drug; Combo Route) route once daily at 6am., Disp: 100 each, Rfl: 3    polyethylene glycol (GLYCOLAX) 17 gram PwPk, Take 17 g by mouth once daily., Disp: 100 each, Rfl: 2    SSD 1 % cream, APPLY 1 APPLICATION TOPICALLY TWICE A DAY, Disp: , Rfl:     triamcinolone acetonide 0.1% (KENALOG) 0.1 % cream, APPLY 1 APPLICATION ON THE SKIN TWICE A DAY AS NEEDED DO NOT APPLY TO FACE, AXILLA OR GROIN, Disp: , Rfl:     warfarin (COUMADIN) 5 MG tablet, Take 2.5 mg by mouth once daily., Disp: , Rfl:     zolpidem (AMBIEN) 10 mg Tab, Take 1 tablet (10 mg total) by mouth nightly as needed (sleep)., Disp: 90 tablet, Rfl: 0    Review of Systems   Constitutional:  Negative for fatigue, fever and unexpected weight change.   HENT:  Negative for ear pain, sinus pressure and sore throat.    Eyes:  Negative for pain.   Respiratory:  Negative for cough and shortness of breath.    Cardiovascular:  Negative for chest pain and leg swelling.   Gastrointestinal:  Negative for abdominal pain, constipation, nausea and vomiting.   Genitourinary:  Negative for dysuria, frequency and urgency.   Musculoskeletal:  Negative for arthralgias.   Skin:  Negative for rash.   Neurological:  Negative for dizziness, weakness and headaches.   Psychiatric/Behavioral:  Negative for sleep disturbance.         Objective:      Vitals:    01/03/23 0906   BP: 128/70   Pulse: 80   Weight: 109.8 kg (242 lb)   Height: 5' 11" (1.803 m)     Physical Exam  Vitals and nursing note reviewed.   Constitutional:       Appearance: Normal appearance. He is well-developed.      Comments: cane   HENT:      Head: Normocephalic and atraumatic.      Right Ear: External ear normal.      Left Ear: External ear normal.   Eyes:      Pupils: Pupils are equal, round, and reactive to light.   Neck:      Trachea: No tracheal " deviation.   Cardiovascular:      Rate and Rhythm: Normal rate and regular rhythm.      Heart sounds: No murmur heard.    No friction rub. No gallop.   Pulmonary:      Breath sounds: Normal breath sounds. No stridor. No wheezing or rales.   Abdominal:      Palpations: Abdomen is soft. There is no mass.      Tenderness: There is no abdominal tenderness.   Musculoskeletal:         General: No tenderness or deformity.      Cervical back: Neck supple.   Lymphadenopathy:      Cervical: No cervical adenopathy.   Skin:     General: Skin is warm and dry.   Neurological:      Mental Status: He is alert and oriented to person, place, and time.      Coordination: Coordination normal.   Psychiatric:         Thought Content: Thought content normal.         Assessment:       1. Uncontrolled type 2 diabetes mellitus with hyperglycemia    2. Paroxysmal atrial fibrillation    3. Coronary artery disease, unspecified vessel or lesion type, unspecified whether angina present, unspecified whether native or transplanted heart    4. Hyperlipidemia, unspecified hyperlipidemia type    5. Hypertension, unspecified type    6. Osteoarthritis, unspecified osteoarthritis type, unspecified site    7. High risk medication use           Plan:       Uncontrolled type 2 diabetes mellitus with hyperglycemia  Comments:  discussed following diabetic diet  Orders:  -     Hemoglobin A1C, POCT  -     CBC Auto Differential; Future; Expected date: 01/05/2023  -     Comprehensive Metabolic Panel; Future; Expected date: 01/05/2023  -     Lipid Panel; Future; Expected date: 01/05/2023  -     TSH w/reflex to FT4; Future; Expected date: 01/05/2023  -     Microalbumin/Creatinine Ratio, Urine; Future; Expected date: 01/05/2023  -     Urinalysis, Reflex to Urine Culture Urine, Clean Catch; Future; Expected date: 01/05/2023    Paroxysmal atrial fibrillation  -     CBC Auto Differential; Future; Expected date: 01/05/2023  -     Comprehensive Metabolic Panel; Future;  Expected date: 01/05/2023  -     Lipid Panel; Future; Expected date: 01/05/2023  -     TSH w/reflex to FT4; Future; Expected date: 01/05/2023  -     Microalbumin/Creatinine Ratio, Urine; Future; Expected date: 01/05/2023  -     Urinalysis, Reflex to Urine Culture Urine, Clean Catch; Future; Expected date: 01/05/2023    Coronary artery disease, unspecified vessel or lesion type, unspecified whether angina present, unspecified whether native or transplanted heart  -     CBC Auto Differential; Future; Expected date: 01/05/2023  -     Comprehensive Metabolic Panel; Future; Expected date: 01/05/2023  -     Lipid Panel; Future; Expected date: 01/05/2023  -     TSH w/reflex to FT4; Future; Expected date: 01/05/2023  -     Microalbumin/Creatinine Ratio, Urine; Future; Expected date: 01/05/2023  -     Urinalysis, Reflex to Urine Culture Urine, Clean Catch; Future; Expected date: 01/05/2023    Hyperlipidemia, unspecified hyperlipidemia type  -     Lipid Panel; Future; Expected date: 01/05/2023    Hypertension, unspecified type  -     CBC Auto Differential; Future; Expected date: 01/05/2023  -     Comprehensive Metabolic Panel; Future; Expected date: 01/05/2023    Osteoarthritis, unspecified osteoarthritis type, unspecified site    High risk medication use  -     CBC Auto Differential; Future; Expected date: 01/05/2023  -     Comprehensive Metabolic Panel; Future; Expected date: 01/05/2023  -     Lipid Panel; Future; Expected date: 01/05/2023  -     TSH w/reflex to FT4; Future; Expected date: 01/05/2023  -     Microalbumin/Creatinine Ratio, Urine; Future; Expected date: 01/05/2023  -     Urinalysis, Reflex to Urine Culture Urine, Clean Catch; Future; Expected date: 01/05/2023      Follow up in about 3 months (around 4/3/2023) for medication management.        1/5/2023 Leana Perez

## 2023-01-20 DIAGNOSIS — G47.00 INSOMNIA, UNSPECIFIED TYPE: ICD-10-CM

## 2023-01-20 RX ORDER — ZOLPIDEM TARTRATE 10 MG/1
10 TABLET ORAL NIGHTLY PRN
Qty: 90 TABLET | Refills: 0 | Status: SHIPPED | OUTPATIENT
Start: 2023-01-20 | End: 2023-11-20 | Stop reason: SDUPTHER

## 2023-01-20 NOTE — TELEPHONE ENCOUNTER
----- Message from Kyleigh Quintana sent at 1/20/2023 10:14 AM CST -----  Patient called and stated that he need a refill of his zolpidem called into [facility] on [address] if any question please give him a call at [phone]

## 2023-02-22 DIAGNOSIS — E11.65 UNCONTROLLED TYPE 2 DIABETES MELLITUS WITH HYPERGLYCEMIA: ICD-10-CM

## 2023-02-22 RX ORDER — INSULIN GLARGINE 300 U/ML
24 INJECTION, SOLUTION SUBCUTANEOUS DAILY
Qty: 2 PEN | Refills: 0 | Status: SHIPPED | OUTPATIENT
Start: 2023-02-22 | End: 2023-04-13 | Stop reason: SDUPTHER

## 2023-02-22 NOTE — TELEPHONE ENCOUNTER
----- Message from Claudia Chung sent at 2/22/2023  9:16 AM CST -----  Refill Toujeo Can you request 1 box for right now. [facility] [address] [employer]. Pt's # [phone]

## 2023-03-15 ENCOUNTER — TELEPHONE (OUTPATIENT)
Dept: FAMILY MEDICINE | Facility: CLINIC | Age: 88
End: 2023-03-15

## 2023-04-07 LAB
ALBUMIN SERPL-MCNC: 4 G/DL (ref 3.6–5.1)
ALBUMIN/CREAT UR: 278 MCG/MG CREAT
ALBUMIN/GLOB SERPL: 1.3 (CALC) (ref 1–2.5)
ALP SERPL-CCNC: 90 U/L (ref 35–144)
ALT SERPL-CCNC: 7 U/L (ref 9–46)
APPEARANCE UR: ABNORMAL
AST SERPL-CCNC: 9 U/L (ref 10–35)
BACTERIA #/AREA URNS HPF: ABNORMAL /HPF
BACTERIA UR CULT: ABNORMAL
BACTERIA UR CULT: ABNORMAL
BASOPHILS # BLD AUTO: 79 CELLS/UL (ref 0–200)
BASOPHILS NFR BLD AUTO: 1 %
BILIRUB SERPL-MCNC: 0.4 MG/DL (ref 0.2–1.2)
BILIRUB UR QL STRIP: NEGATIVE
BUN SERPL-MCNC: 43 MG/DL (ref 7–25)
BUN/CREAT SERPL: 18 (CALC) (ref 6–22)
CALCIUM SERPL-MCNC: 8.8 MG/DL (ref 8.6–10.3)
CHLORIDE SERPL-SCNC: 105 MMOL/L (ref 98–110)
CHOLEST SERPL-MCNC: 128 MG/DL
CHOLEST/HDLC SERPL: 3 (CALC)
CO2 SERPL-SCNC: 26 MMOL/L (ref 20–32)
COLOR UR: YELLOW
CREAT SERPL-MCNC: 2.42 MG/DL (ref 0.7–1.22)
CREAT UR-MCNC: 96 MG/DL (ref 20–320)
EGFR: 26 ML/MIN/1.73M2
EOSINOPHIL # BLD AUTO: 379 CELLS/UL (ref 15–500)
EOSINOPHIL NFR BLD AUTO: 4.8 %
ERYTHROCYTE [DISTWIDTH] IN BLOOD BY AUTOMATED COUNT: 13.6 % (ref 11–15)
GLOBULIN SER CALC-MCNC: 3 G/DL (CALC) (ref 1.9–3.7)
GLUCOSE SERPL-MCNC: 174 MG/DL (ref 65–99)
GLUCOSE UR QL STRIP: ABNORMAL
HCT VFR BLD AUTO: 40.6 % (ref 38.5–50)
HDLC SERPL-MCNC: 42 MG/DL
HGB BLD-MCNC: 13 G/DL (ref 13.2–17.1)
HGB UR QL STRIP: ABNORMAL
HYALINE CASTS #/AREA URNS LPF: ABNORMAL /LPF
KETONES UR QL STRIP: NEGATIVE
LDLC SERPL CALC-MCNC: 71 MG/DL (CALC)
LEUKOCYTE ESTERASE UR QL STRIP: ABNORMAL
LYMPHOCYTES # BLD AUTO: 2275 CELLS/UL (ref 850–3900)
LYMPHOCYTES NFR BLD AUTO: 28.8 %
MCH RBC QN AUTO: 27 PG (ref 27–33)
MCHC RBC AUTO-ENTMCNC: 32 G/DL (ref 32–36)
MCV RBC AUTO: 84.4 FL (ref 80–100)
MICROALBUMIN UR-MCNC: 26.7 MG/DL
MONOCYTES # BLD AUTO: 743 CELLS/UL (ref 200–950)
MONOCYTES NFR BLD AUTO: 9.4 %
NEUTROPHILS # BLD AUTO: 4424 CELLS/UL (ref 1500–7800)
NEUTROPHILS NFR BLD AUTO: 56 %
NITRITE UR QL STRIP: NEGATIVE
NONHDLC SERPL-MCNC: 86 MG/DL (CALC)
PH UR STRIP: ABNORMAL [PH] (ref 5–8)
PLATELET # BLD AUTO: 215 THOUSAND/UL (ref 140–400)
PMV BLD REES-ECKER: 9.9 FL (ref 7.5–12.5)
POTASSIUM SERPL-SCNC: 4.7 MMOL/L (ref 3.5–5.3)
PROT SERPL-MCNC: 7 G/DL (ref 6.1–8.1)
PROT UR QL STRIP: ABNORMAL
RBC # BLD AUTO: 4.81 MILLION/UL (ref 4.2–5.8)
RBC #/AREA URNS HPF: ABNORMAL /HPF
SERVICE CMNT-IMP: ABNORMAL
SODIUM SERPL-SCNC: 140 MMOL/L (ref 135–146)
SP GR UR STRIP: 1.02 (ref 1–1.03)
SQUAMOUS #/AREA URNS HPF: ABNORMAL /HPF
T4 FREE SERPL-MCNC: 1.4 NG/DL (ref 0.8–1.8)
TRIGL SERPL-MCNC: 72 MG/DL
TSH SERPL-ACNC: 6.12 MIU/L (ref 0.4–4.5)
WBC # BLD AUTO: 7.9 THOUSAND/UL (ref 3.8–10.8)
WBC #/AREA URNS HPF: ABNORMAL /HPF
YEAST #/AREA URNS HPF: ABNORMAL /HPF

## 2023-04-10 ENCOUNTER — TELEPHONE (OUTPATIENT)
Dept: FAMILY MEDICINE | Facility: CLINIC | Age: 88
End: 2023-04-10

## 2023-04-10 RX ORDER — AMOXICILLIN AND CLAVULANATE POTASSIUM 875; 125 MG/1; MG/1
1 TABLET, FILM COATED ORAL 2 TIMES DAILY
Qty: 14 TABLET | Refills: 0 | Status: SHIPPED | OUTPATIENT
Start: 2023-04-10 | End: 2023-08-09

## 2023-04-13 ENCOUNTER — OFFICE VISIT (OUTPATIENT)
Dept: FAMILY MEDICINE | Facility: CLINIC | Age: 88
End: 2023-04-13
Payer: MEDICARE

## 2023-04-13 VITALS — HEIGHT: 71 IN | BODY MASS INDEX: 34.6 KG/M2 | OXYGEN SATURATION: 95 % | WEIGHT: 247.19 LBS | HEART RATE: 94 BPM

## 2023-04-13 DIAGNOSIS — M19.90 OSTEOARTHRITIS, UNSPECIFIED OSTEOARTHRITIS TYPE, UNSPECIFIED SITE: ICD-10-CM

## 2023-04-13 DIAGNOSIS — E11.65 UNCONTROLLED TYPE 2 DIABETES MELLITUS WITH HYPERGLYCEMIA: ICD-10-CM

## 2023-04-13 DIAGNOSIS — C61 PROSTATE CANCER: ICD-10-CM

## 2023-04-13 DIAGNOSIS — N18.31 STAGE 3A CHRONIC KIDNEY DISEASE: ICD-10-CM

## 2023-04-13 DIAGNOSIS — E78.5 HYPERLIPIDEMIA, UNSPECIFIED HYPERLIPIDEMIA TYPE: ICD-10-CM

## 2023-04-13 DIAGNOSIS — E11.69 TYPE 2 DIABETES MELLITUS WITH OTHER SPECIFIED COMPLICATION, WITH LONG-TERM CURRENT USE OF INSULIN: ICD-10-CM

## 2023-04-13 DIAGNOSIS — E11.65 UNCONTROLLED TYPE 2 DIABETES MELLITUS WITH HYPERGLYCEMIA: Primary | ICD-10-CM

## 2023-04-13 DIAGNOSIS — I25.10 CORONARY ARTERY DISEASE, UNSPECIFIED VESSEL OR LESION TYPE, UNSPECIFIED WHETHER ANGINA PRESENT, UNSPECIFIED WHETHER NATIVE OR TRANSPLANTED HEART: ICD-10-CM

## 2023-04-13 DIAGNOSIS — I10 HYPERTENSION, UNSPECIFIED TYPE: ICD-10-CM

## 2023-04-13 DIAGNOSIS — Z79.899 HIGH RISK MEDICATION USE: ICD-10-CM

## 2023-04-13 DIAGNOSIS — R79.89 ELEVATED TSH: ICD-10-CM

## 2023-04-13 DIAGNOSIS — Z79.4 TYPE 2 DIABETES MELLITUS WITH OTHER SPECIFIED COMPLICATION, WITH LONG-TERM CURRENT USE OF INSULIN: ICD-10-CM

## 2023-04-13 DIAGNOSIS — I48.0 PAROXYSMAL ATRIAL FIBRILLATION: ICD-10-CM

## 2023-04-13 DIAGNOSIS — N18.4 CHRONIC KIDNEY DISEASE (CKD), STAGE IV (SEVERE): ICD-10-CM

## 2023-04-13 LAB — HBA1C MFR BLD: 9.9 %

## 2023-04-13 PROCEDURE — 83036 POCT HEMOGLOBIN A1C: ICD-10-PCS | Mod: QW,,, | Performed by: NURSE PRACTITIONER

## 2023-04-13 PROCEDURE — 99214 PR OFFICE/OUTPT VISIT, EST, LEVL IV, 30-39 MIN: ICD-10-PCS | Mod: S$GLB,,, | Performed by: NURSE PRACTITIONER

## 2023-04-13 PROCEDURE — 83036 HEMOGLOBIN GLYCOSYLATED A1C: CPT | Mod: QW,,, | Performed by: NURSE PRACTITIONER

## 2023-04-13 PROCEDURE — 99214 OFFICE O/P EST MOD 30 MIN: CPT | Mod: S$GLB,,, | Performed by: NURSE PRACTITIONER

## 2023-04-13 NOTE — PROGRESS NOTES
SUBJECTIVE:    Patient ID: Alex Hatch is a 87 y.o. male.    Chief Complaint: Follow-up (No bottles/ 3 month f/u no new complaints/lab review/bg)    87-year-old male presents for check up.  Treated for htn, hyperlipidemia, atrial fibrillation, cad, ckd, dm2 and bph. He is following up for uncontrolled diabetes. Today a1c is up to 9.9mg/dl he reports not watching diet as much. Wife does not cook. Sees dr. Lux regularly. Inr is monitored at his office. Using cane for ambulation. Recently had labs done. Would like to discuss results.       Office Visit on 04/13/2023   Component Date Value Ref Range Status    Hemoglobin A1C, POC 04/13/2023 9.9  % Final   Office Visit on 01/03/2023   Component Date Value Ref Range Status    Hemoglobin A1C, POC 01/03/2023 8.4  % Final    WBC 04/03/2023 7.9  3.8 - 10.8 Thousand/uL Final    RBC 04/03/2023 4.81  4.20 - 5.80 Million/uL Final    Hemoglobin 04/03/2023 13.0 (L)  13.2 - 17.1 g/dL Final    Hematocrit 04/03/2023 40.6  38.5 - 50.0 % Final    MCV 04/03/2023 84.4  80.0 - 100.0 fL Final    MCH 04/03/2023 27.0  27.0 - 33.0 pg Final    MCHC 04/03/2023 32.0  32.0 - 36.0 g/dL Final    RDW 04/03/2023 13.6  11.0 - 15.0 % Final    Platelets 04/03/2023 215  140 - 400 Thousand/uL Final    MPV 04/03/2023 9.9  7.5 - 12.5 fL Final    Neutrophils, Abs 04/03/2023 4,424  1,500 - 7,800 cells/uL Final    Lymph # 04/03/2023 2,275  850 - 3,900 cells/uL Final    Mono # 04/03/2023 743  200 - 950 cells/uL Final    Eos # 04/03/2023 379  15 - 500 cells/uL Final    Baso # 04/03/2023 79  0 - 200 cells/uL Final    Neutrophils Relative 04/03/2023 56  % Final    Lymph % 04/03/2023 28.8  % Final    Mono % 04/03/2023 9.4  % Final    Eosinophil % 04/03/2023 4.8  % Final    Basophil % 04/03/2023 1.0  % Final    Glucose 04/03/2023 174 (H)  65 - 99 mg/dL Final    BUN 04/03/2023 43 (H)  7 - 25 mg/dL Final    Creatinine 04/03/2023 2.42 (H)  0.70 - 1.22 mg/dL Final    eGFR 04/03/2023 26 (L)  > OR = 60  mL/min/1.73m2 Final    BUN/Creatinine Ratio 04/03/2023 18  6 - 22 (calc) Final    Sodium 04/03/2023 140  135 - 146 mmol/L Final    Potassium 04/03/2023 4.7  3.5 - 5.3 mmol/L Final    Chloride 04/03/2023 105  98 - 110 mmol/L Final    CO2 04/03/2023 26  20 - 32 mmol/L Final    Calcium 04/03/2023 8.8  8.6 - 10.3 mg/dL Final    Total Protein 04/03/2023 7.0  6.1 - 8.1 g/dL Final    Albumin 04/03/2023 4.0  3.6 - 5.1 g/dL Final    Globulin, Total 04/03/2023 3.0  1.9 - 3.7 g/dL (calc) Final    Albumin/Globulin Ratio 04/03/2023 1.3  1.0 - 2.5 (calc) Final    Total Bilirubin 04/03/2023 0.4  0.2 - 1.2 mg/dL Final    Alkaline Phosphatase 04/03/2023 90  35 - 144 U/L Final    AST 04/03/2023 9 (L)  10 - 35 U/L Final    ALT 04/03/2023 7 (L)  9 - 46 U/L Final    Cholesterol 04/03/2023 128  <200 mg/dL Final    HDL 04/03/2023 42  > OR = 40 mg/dL Final    Triglycerides 04/03/2023 72  <150 mg/dL Final    LDL Cholesterol 04/03/2023 71  mg/dL (calc) Final    HDL/Cholesterol Ratio 04/03/2023 3.0  <5.0 (calc) Final    Non HDL Chol. (LDL+VLDL) 04/03/2023 86  <130 mg/dL (calc) Final    TSH w/reflex to FT4 04/03/2023 6.12 (H)  0.40 - 4.50 mIU/L Final    T4, Free 04/03/2023 1.4  0.8 - 1.8 ng/dL Final    Creatinine, Urine 04/03/2023 96  20 - 320 mg/dL Final    Microalb, Ur 04/03/2023 26.7  See Note: mg/dL Final    Microalb/Creat Ratio 04/03/2023 278 (H)  <30 mcg/mg creat Final    Color, UA 04/03/2023 YELLOW  YELLOW Final    Appearance, UA 04/03/2023 CLOUDY (A)  CLEAR Final    Specific Gravity, UA 04/03/2023 1.017  1.001 - 1.035 Final    pH, UA 04/03/2023 < OR = 5.0  5.0 - 8.0 Final    Glucose, UA 04/03/2023 1+ (A)  NEGATIVE Final    Bilirubin, UA 04/03/2023 NEGATIVE  NEGATIVE Final    Ketones, UA 04/03/2023 NEGATIVE  NEGATIVE Final    Occult Blood UA 04/03/2023 2+ (A)  NEGATIVE Final    Protein, UA 04/03/2023 2+ (A)  NEGATIVE Final    Nitrite, UA 04/03/2023 NEGATIVE  NEGATIVE Final    Leukocytes, UA 04/03/2023 2+ (A)  NEGATIVE Final    WBC  Casts, UA 04/03/2023 > OR = 60 (A)  < OR = 5 /HPF Final    RBC Casts, UA 04/03/2023 20-40 (A)  < OR = 2 /HPF Final    Squam Epithel, UA 04/03/2023 NONE SEEN  < OR = 5 /HPF Final    Bacteria, UA 04/03/2023 MODERATE (A)  NONE SEEN /HPF Final    Hyaline Casts, UA 04/03/2023 NONE SEEN  NONE SEEN /LPF Final    Yeast, UA 04/03/2023 NONE SEEN  NONE SEEN /HPF Final    Service Cmt: 04/03/2023    Final    Reflexive Urine Culture 04/03/2023    Final    Urine Culture, Routine 04/03/2023  (A)   Final       Past Medical History:   Diagnosis Date    Atrial fibrillation     Dr Lux    CKD (chronic kidney disease), stage III     Coronary artery disease     Diabetes mellitus, type 2     Hyperlipidemia     Hypertension      Social History     Socioeconomic History    Marital status:    Tobacco Use    Smoking status: Never    Smokeless tobacco: Never   Substance and Sexual Activity    Alcohol use: No    Drug use: No     Past Surgical History:   Procedure Laterality Date    CARDIAC PACEMAKER PLACEMENT  5/2013    Dr Lux    COLONOSCOPY W/ POLYPECTOMY  2011    Dr Mar, repeat in 3 years    CORONARY ARTERY BYPASS GRAFT  1993    CYSTOSCOPY N/A 9/29/2021    Procedure: CYSTOSCOPY;  Surgeon: Ramona Skaggs Jr., MD;  Location: [facility] OR;  Service: Urology;  Laterality: N/A;    cassandra      TRANSRECTAL ULTRASOUND EXAMINATION N/A 9/29/2021    Procedure: ULTRASOUND, RECTAL APPROACH;  Surgeon: Ramona Skaggs Jr., MD;  Location: [facility] OR;  Service: Urology;  Laterality: N/A;    TRANSURETHRAL RESECTION OF PROSTATE N/A 10/26/2021    Procedure: TURP (TRANSURETHRAL RESECTION OF PROSTATE);  Surgeon: Ramona Skaggs Jr., MD;  Location: [facility] OR;  Service: Urology;  Laterality: N/A;     History reviewed. No pertinent family history.    Review of patient's allergies indicates:   Allergen Reactions    Macrobid [nitrofurantoin monohyd/m-cryst]     Iodinated contrast media      Betadine ok       Current Outpatient Medications:     acetaminophen-codeine  "300-60mg (TYLENOL #4) 300-60 mg Tab, TAKE 1 TABLET BY MOUTH EVERY 12 HOURS AS NEEDED FOR KNEE PAIN, Disp: , Rfl:     amoxicillin-clavulanate 875-125mg (AUGMENTIN) 875-125 mg per tablet, Take 1 tablet by mouth 2 (two) times daily., Disp: 14 tablet, Rfl: 0    benazepriL (LOTENSIN) 20 MG tablet, Take 1 tablet (20 mg total) by mouth once daily., Disp: 90 tablet, Rfl: 1    blood sugar diagnostic Strp, To check BG 3 times daily, to use with insurance preferred meter, Disp: 100 each, Rfl: 0    blood-glucose meter kit, To check BG 3 times daily, to use with insurance preferred meter, Disp: 1 each, Rfl: 0    clobetasoL (TEMOVATE) 0.05 % external solution, APPLY TWICE A DAY TO HEAD WHEN ITCHING, Disp: , Rfl:     docusate sodium (COLACE) 100 MG capsule, Take 1 capsule (100 mg total) by mouth 2 (two) times daily., Disp: 60 capsule, Rfl: 2    furosemide (LASIX) 40 MG tablet, Take 1 tablet (40 mg total) by mouth daily as needed. (Patient not taking: No sig reported), Disp: 30 tablet, Rfl: 3    insulin glargine, TOUJEO, (TOUJEO SOLOSTAR U-300 INSULIN) 300 unit/mL (1.5 mL) InPn pen, Inject 24 Units into the skin once daily., Disp: 3 pen, Rfl: 0    lancets Misc, To check BG 3 times daily, to use with insurance preferred meter, Disp: 100 each, Rfl: 0    linaCLOtide (LINZESS) 145 mcg Cap capsule, Take 1 capsule (145 mcg total) by mouth before breakfast., Disp: 90 capsule, Rfl: 1    lovastatin (MEVACOR) 20 MG tablet, Take 1 tablet (20 mg total) by mouth every evening., Disp: 90 tablet, Rfl: 1    pen needle, diabetic 32 gauge x 5/32" Ndle, 1 each by Misc.(Non-Drug; Combo Route) route once daily at 6am., Disp: 100 each, Rfl: 3    polyethylene glycol (GLYCOLAX) 17 gram PwPk, Take 17 g by mouth once daily., Disp: 100 each, Rfl: 2    SSD 1 % cream, APPLY 1 APPLICATION TOPICALLY TWICE A DAY, Disp: , Rfl:     triamcinolone acetonide 0.1% (KENALOG) 0.1 % cream, APPLY 1 APPLICATION ON THE SKIN TWICE A DAY AS NEEDED DO NOT APPLY TO FACE, AXILLA " "OR GROIN, Disp: , Rfl:     warfarin (COUMADIN) 5 MG tablet, Take 2.5 mg by mouth once daily., Disp: , Rfl:     zolpidem (AMBIEN) 10 mg Tab, Take 1 tablet (10 mg total) by mouth nightly as needed (sleep)., Disp: 90 tablet, Rfl: 0    Review of Systems   Constitutional:  Negative for fatigue, fever and unexpected weight change.   HENT:  Negative for ear pain, sinus pressure and sore throat.    Eyes:  Negative for pain.   Respiratory:  Negative for cough and shortness of breath.    Cardiovascular:  Negative for chest pain and leg swelling.   Gastrointestinal:  Negative for abdominal pain, constipation, nausea and vomiting.   Genitourinary:  Negative for dysuria, frequency and urgency.   Musculoskeletal:  Negative for arthralgias.   Skin:  Negative for rash.   Neurological:  Negative for dizziness, weakness and headaches.   Psychiatric/Behavioral:  Negative for sleep disturbance.         Objective:      Vitals:    04/13/23 1021   Pulse: 94   SpO2: 95%   Weight: 112.1 kg (247 lb 3.2 oz)   Height: 5' 11" (1.803 m)     Physical Exam  Vitals and nursing note reviewed.   Constitutional:       General: He is not in acute distress.     Appearance: Normal appearance. He is well-developed. He is obese.   HENT:      Head: Normocephalic and atraumatic.      Right Ear: External ear normal.      Left Ear: External ear normal.   Eyes:      Pupils: Pupils are equal, round, and reactive to light.   Neck:      Trachea: No tracheal deviation.   Cardiovascular:      Rate and Rhythm: Normal rate and regular rhythm.      Heart sounds: No murmur heard.    No friction rub. No gallop.   Pulmonary:      Breath sounds: Normal breath sounds. No stridor. No wheezing or rales.   Abdominal:      Palpations: Abdomen is soft. There is no mass.      Tenderness: There is no abdominal tenderness.   Musculoskeletal:         General: No tenderness or deformity.      Cervical back: Neck supple.   Feet:      Right foot:      Protective Sensation: 5 sites " tested.  3 sites sensed.      Left foot:      Protective Sensation: 5 sites tested.  3 sites sensed.   Lymphadenopathy:      Cervical: No cervical adenopathy.   Skin:     General: Skin is warm and dry.   Neurological:      Mental Status: He is alert and oriented to person, place, and time.      Coordination: Coordination normal.   Psychiatric:         Thought Content: Thought content normal.         Assessment:       1. Uncontrolled type 2 diabetes mellitus with hyperglycemia    2. Uncontrolled type 2 diabetes mellitus with hyperglycemia    3. Paroxysmal atrial fibrillation    4. Coronary artery disease, unspecified vessel or lesion type, unspecified whether angina present, unspecified whether native or transplanted heart    5. Hyperlipidemia, unspecified hyperlipidemia type    6. Hypertension, unspecified type    7. Stage 3a chronic kidney disease    8. Type 2 diabetes mellitus with other specified complication, with long-term current use of insulin    9. Prostate cancer    10. Chronic kidney disease (CKD), stage IV (severe)    11. High risk medication use    12. Osteoarthritis, unspecified osteoarthritis type, unspecified site    13. Elevated TSH         Plan:       Uncontrolled type 2 diabetes mellitus with hyperglycemia  -     Hemoglobin A1C, POCT  -     insulin glargine, TOUJEO, (TOUJEO SOLOSTAR U-300 INSULIN) 300 unit/mL (1.5 mL) InPn pen; Inject 24 Units into the skin once daily.  Dispense: 3 pen; Refill: 0    Uncontrolled type 2 diabetes mellitus with hyperglycemia  Comments:  increase toujeo to 24 units  Orders:  -     Hemoglobin A1C, POCT  -     insulin glargine, TOUJEO, (TOUJEO SOLOSTAR U-300 INSULIN) 300 unit/mL (1.5 mL) InPn pen; Inject 24 Units into the skin once daily.  Dispense: 3 pen; Refill: 0    Paroxysmal atrial fibrillation    Coronary artery disease, unspecified vessel or lesion type, unspecified whether angina present, unspecified whether native or transplanted heart    Hyperlipidemia,  unspecified hyperlipidemia type    Hypertension, unspecified type    Stage 3a chronic kidney disease    Type 2 diabetes mellitus with other specified complication, with long-term current use of insulin    Prostate cancer    Chronic kidney disease (CKD), stage IV (severe)    High risk medication use    Osteoarthritis, unspecified osteoarthritis type, unspecified site    Elevated TSH  Comments:  does not want to start medication at this time      Follow up in about 6 weeks (around 5/25/2023), or if symptoms worsen or fail to improve, for medication management.        4/27/2023 Leana Perez

## 2023-04-14 ENCOUNTER — TELEPHONE (OUTPATIENT)
Dept: FAMILY MEDICINE | Facility: CLINIC | Age: 88
End: 2023-04-14

## 2023-04-14 DIAGNOSIS — E11.65 UNCONTROLLED TYPE 2 DIABETES MELLITUS WITH HYPERGLYCEMIA: Primary | ICD-10-CM

## 2023-04-14 NOTE — TELEPHONE ENCOUNTER
----- Message from Kita Loera sent at 4/14/2023  9:29 AM CDT -----  Pt would like to know if he qualify for a glucose monitor. He states he forgot to mention it on yesterday. [phone]

## 2023-04-17 ENCOUNTER — TELEPHONE (OUTPATIENT)
Dept: FAMILY MEDICINE | Facility: CLINIC | Age: 88
End: 2023-04-17

## 2023-04-17 NOTE — TELEPHONE ENCOUNTER
----- Message from RT Sisi sent at 4/17/2023  8:19 AM CDT -----  Looks like you had called him.     ----- Message -----  From: Kyleigh Quintana  Sent: 4/11/2023  10:45 AM CDT  To: RT Sisi    Patient called and stated that he missed a call. Please give him a call back at [phone]

## 2023-04-27 PROBLEM — N18.4 CHRONIC KIDNEY DISEASE (CKD), STAGE IV (SEVERE): Status: ACTIVE | Noted: 2023-04-27

## 2023-04-27 PROBLEM — C61 PROSTATE CANCER: Status: ACTIVE | Noted: 2023-04-27

## 2023-04-27 RX ORDER — INSULIN GLARGINE 300 U/ML
24 INJECTION, SOLUTION SUBCUTANEOUS DAILY
Qty: 3 PEN | Refills: 0 | Status: SHIPPED | OUTPATIENT
Start: 2023-04-27 | End: 2023-07-20 | Stop reason: SDUPTHER

## 2023-05-11 ENCOUNTER — TELEPHONE (OUTPATIENT)
Dept: FAMILY MEDICINE | Facility: CLINIC | Age: 88
End: 2023-05-11

## 2023-05-11 ENCOUNTER — OFFICE VISIT (OUTPATIENT)
Dept: FAMILY MEDICINE | Facility: CLINIC | Age: 88
End: 2023-05-11
Payer: MEDICARE

## 2023-05-11 VITALS
WEIGHT: 247 LBS | HEART RATE: 86 BPM | BODY MASS INDEX: 34.58 KG/M2 | DIASTOLIC BLOOD PRESSURE: 60 MMHG | HEIGHT: 71 IN | SYSTOLIC BLOOD PRESSURE: 110 MMHG | OXYGEN SATURATION: 98 %

## 2023-05-11 DIAGNOSIS — N18.4 CHRONIC KIDNEY DISEASE (CKD), STAGE IV (SEVERE): ICD-10-CM

## 2023-05-11 DIAGNOSIS — E66.9 OBESITY, UNSPECIFIED CLASSIFICATION, UNSPECIFIED OBESITY TYPE, UNSPECIFIED WHETHER SERIOUS COMORBIDITY PRESENT: ICD-10-CM

## 2023-05-11 DIAGNOSIS — I25.10 CORONARY ARTERY DISEASE, UNSPECIFIED VESSEL OR LESION TYPE, UNSPECIFIED WHETHER ANGINA PRESENT, UNSPECIFIED WHETHER NATIVE OR TRANSPLANTED HEART: ICD-10-CM

## 2023-05-11 DIAGNOSIS — E11.69 TYPE 2 DIABETES MELLITUS WITH OTHER SPECIFIED COMPLICATION, WITH LONG-TERM CURRENT USE OF INSULIN: ICD-10-CM

## 2023-05-11 DIAGNOSIS — Z79.4 TYPE 2 DIABETES MELLITUS WITH OTHER SPECIFIED COMPLICATION, WITH LONG-TERM CURRENT USE OF INSULIN: ICD-10-CM

## 2023-05-11 DIAGNOSIS — I10 HYPERTENSION, UNSPECIFIED TYPE: ICD-10-CM

## 2023-05-11 DIAGNOSIS — E78.5 HYPERLIPIDEMIA, UNSPECIFIED HYPERLIPIDEMIA TYPE: ICD-10-CM

## 2023-05-11 DIAGNOSIS — I48.0 PAROXYSMAL ATRIAL FIBRILLATION: Primary | ICD-10-CM

## 2023-05-11 LAB
CTP QC/QA: YES
HBA1C MFR BLD: 9 %
INR PPP: 4.1 (ref 2–3)
PROTHROMBIN TIME, POC: 48.6 (ref 2–3)

## 2023-05-11 PROCEDURE — 83036 HEMOGLOBIN GLYCOSYLATED A1C: CPT | Mod: QW,,, | Performed by: NURSE PRACTITIONER

## 2023-05-11 PROCEDURE — 99214 OFFICE O/P EST MOD 30 MIN: CPT | Mod: S$GLB,,, | Performed by: NURSE PRACTITIONER

## 2023-05-11 PROCEDURE — 99214 PR OFFICE/OUTPT VISIT, EST, LEVL IV, 30-39 MIN: ICD-10-PCS | Mod: S$GLB,,, | Performed by: NURSE PRACTITIONER

## 2023-05-11 PROCEDURE — 85610 PROTHROMBIN TIME: CPT | Mod: QW,,, | Performed by: NURSE PRACTITIONER

## 2023-05-11 PROCEDURE — 83036 POCT HEMOGLOBIN A1C: ICD-10-PCS | Mod: QW,,, | Performed by: NURSE PRACTITIONER

## 2023-05-11 PROCEDURE — 85610 POCT PT/INR: ICD-10-PCS | Mod: QW,,, | Performed by: NURSE PRACTITIONER

## 2023-05-11 NOTE — TELEPHONE ENCOUNTER
----- Message from Yareli Max MA sent at 2023 11:51 AM CDT -----  Regardin weekf/u  [phone] per Leana Perez she wants to see this pt back 6 week f/u  please contact this pt when the slot assigned

## 2023-05-21 NOTE — PROGRESS NOTES
SUBJECTIVE:    Patient ID: Alex Hatch is a 87 y.o. male.    Chief Complaint: Follow-up (No bottles/ 4 week f/u with no complaints/bg)    87-year-old male presents for check up.  Treated for htn, hyperlipidemia, atrial fibrillation, cad, ckd, dm2 and bph. He is following up for uncontrolled diabetes. Today a1c is down to 9.0mg/dl he reports trying to watch diet.  Wife does not cook. Sees dr. Lux regularly. Inr is monitored at his office. Using cane for ambulation. Seeing dr. Scott for rash on backside. 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 Started on doxycycline . Has follow up in 1 month. Does feel like improving        F      Office Visit on 05/11/2023   Component Date Value Ref Range Status    INR 05/11/2023 4.1 (A)  2.0 - 3.0 Final     Acceptable 05/11/2023 Yes   Final    Protime 05/11/2023 48.6 (A)  2 - 3 Final    Hemoglobin A1C, POC 05/11/2023 9.0  % Final   Office Visit on 04/13/2023   Component Date Value Ref Range Status    Hemoglobin A1C, POC 04/13/2023 9.9  % Final   Office Visit on 01/03/2023   Component Date Value Ref Range Status    Hemoglobin A1C, POC 01/03/2023 8.4  % Final    WBC 04/03/2023 7.9  3.8 - 10.8 Thousand/uL Final    RBC 04/03/2023 4.81  4.20 - 5.80 Million/uL Final    Hemoglobin 04/03/2023 13.0 (L)  13.2 - 17.1 g/dL Final    Hematocrit 04/03/2023 40.6  38.5 - 50.0 % Final    MCV 04/03/2023 84.4  80.0 - 100.0 fL Final    MCH 04/03/2023 27.0  27.0 - 33.0 pg Final    MCHC 04/03/2023 32.0  32.0 - 36.0 g/dL Final    RDW 04/03/2023 13.6  11.0 - 15.0 % Final    Platelets 04/03/2023 215  140 - 400 Thousand/uL Final    MPV 04/03/2023 9.9  7.5 - 12.5 fL Final    Neutrophils, Abs 04/03/2023 4,424  1,500 - 7,800 cells/uL Final    Lymph # 04/03/2023 2,275  850 - 3,900 cells/uL Final    Mono # 04/03/2023 743  200 - 950 cells/uL Final    Eos # 04/03/2023 379  15 - 500 cells/uL Final     Yojanao # 04/03/2023 79  0 - 200 cells/uL Final    Neutrophils Relative 04/03/2023 56  % Final    Lymph % 04/03/2023 28.8  % Final    Mono % 04/03/2023 9.4  % Final    Eosinophil % 04/03/2023 4.8  % Final    Basophil % 04/03/2023 1.0  % Final    Glucose 04/03/2023 174 (H)  65 - 99 mg/dL Final    BUN 04/03/2023 43 (H)  7 - 25 mg/dL Final    Creatinine 04/03/2023 2.42 (H)  0.70 - 1.22 mg/dL Final    eGFR 04/03/2023 26 (L)  > OR = 60 mL/min/1.73m2 Final    BUN/Creatinine Ratio 04/03/2023 18  6 - 22 (calc) Final    Sodium 04/03/2023 140  135 - 146 mmol/L Final    Potassium 04/03/2023 4.7  3.5 - 5.3 mmol/L Final    Chloride 04/03/2023 105  98 - 110 mmol/L Final    CO2 04/03/2023 26  20 - 32 mmol/L Final    Calcium 04/03/2023 8.8  8.6 - 10.3 mg/dL Final    Total Protein 04/03/2023 7.0  6.1 - 8.1 g/dL Final    Albumin 04/03/2023 4.0  3.6 - 5.1 g/dL Final    Globulin, Total 04/03/2023 3.0  1.9 - 3.7 g/dL (calc) Final    Albumin/Globulin Ratio 04/03/2023 1.3  1.0 - 2.5 (calc) Final    Total Bilirubin 04/03/2023 0.4  0.2 - 1.2 mg/dL Final    Alkaline Phosphatase 04/03/2023 90  35 - 144 U/L Final    AST 04/03/2023 9 (L)  10 - 35 U/L Final    ALT 04/03/2023 7 (L)  9 - 46 U/L Final    Cholesterol 04/03/2023 128  <200 mg/dL Final    HDL 04/03/2023 42  > OR = 40 mg/dL Final    Triglycerides 04/03/2023 72  <150 mg/dL Final    LDL Cholesterol 04/03/2023 71  mg/dL (calc) Final    HDL/Cholesterol Ratio 04/03/2023 3.0  <5.0 (calc) Final    Non HDL Chol. (LDL+VLDL) 04/03/2023 86  <130 mg/dL (calc) Final    TSH w/reflex to FT4 04/03/2023 6.12 (H)  0.40 - 4.50 mIU/L Final    T4, Free 04/03/2023 1.4  0.8 - 1.8 ng/dL Final    Creatinine, Urine 04/03/2023 96  20 - 320 mg/dL Final    Microalb, Ur 04/03/2023 26.7  See Note: mg/dL Final    Microalb/Creat Ratio 04/03/2023 278 (H)  <30 mcg/mg creat Final    Color, UA 04/03/2023 YELLOW  YELLOW Final    Appearance, UA 04/03/2023 CLOUDY (A)  CLEAR Final    Specific Gravity, UA 04/03/2023 1.017  1.001 -  1.035 Final    pH, UA 04/03/2023 < OR = 5.0  5.0 - 8.0 Final    Glucose, UA 04/03/2023 1+ (A)  NEGATIVE Final    Bilirubin, UA 04/03/2023 NEGATIVE  NEGATIVE Final    Ketones, UA 04/03/2023 NEGATIVE  NEGATIVE Final    Occult Blood UA 04/03/2023 2+ (A)  NEGATIVE Final    Protein, UA 04/03/2023 2+ (A)  NEGATIVE Final    Nitrite, UA 04/03/2023 NEGATIVE  NEGATIVE Final    Leukocytes, UA 04/03/2023 2+ (A)  NEGATIVE Final    WBC Casts, UA 04/03/2023 > OR = 60 (A)  < OR = 5 /HPF Final    RBC Casts, UA 04/03/2023 20-40 (A)  < OR = 2 /HPF Final    Squam Epithel, UA 04/03/2023 NONE SEEN  < OR = 5 /HPF Final    Bacteria, UA 04/03/2023 MODERATE (A)  NONE SEEN /HPF Final    Hyaline Casts, UA 04/03/2023 NONE SEEN  NONE SEEN /LPF Final    Yeast, UA 04/03/2023 NONE SEEN  NONE SEEN /HPF Final    Service Cmt: 04/03/2023    Final    Reflexive Urine Culture 04/03/2023    Final    Urine Culture, Routine 04/03/2023  (A)   Final       Past Medical History:   Diagnosis Date    Atrial fibrillation     Dr Lux    CKD (chronic kidney disease), stage III     Coronary artery disease     Diabetes mellitus, type 2     Hyperlipidemia     Hypertension      Social History     Socioeconomic History    Marital status:    Tobacco Use    Smoking status: Never    Smokeless tobacco: Never   Substance and Sexual Activity    Alcohol use: No    Drug use: No     Past Surgical History:   Procedure Laterality Date    CARDIAC PACEMAKER PLACEMENT  5/2013    Dr Lux    COLONOSCOPY W/ POLYPECTOMY  2011    Dr Mar, repeat in 3 years    CORONARY ARTERY BYPASS GRAFT  1993    CYSTOSCOPY N/A 9/29/2021    Procedure: CYSTOSCOPY;  Surgeon: Ramona Skaggs Jr., MD;  Location: [facility] OR;  Service: Urology;  Laterality: N/A;    cassandra      TRANSRECTAL ULTRASOUND EXAMINATION N/A 9/29/2021    Procedure: ULTRASOUND, RECTAL APPROACH;  Surgeon: Ramona Skaggs Jr., MD;  Location: [facility] OR;  Service: Urology;  Laterality: N/A;    TRANSURETHRAL RESECTION OF PROSTATE N/A  10/26/2021    Procedure: TURP (TRANSURETHRAL RESECTION OF PROSTATE);  Surgeon: Ramona Skaggs Jr., MD;  Location: [facility];  Service: Urology;  Laterality: N/A;     History reviewed. No pertinent family history.    Review of patient's allergies indicates:   Allergen Reactions    Macrobid [nitrofurantoin monohyd/m-cryst]     Iodinated contrast media      Betadine ok       Current Outpatient Medications:     acetaminophen-codeine 300-60mg (TYLENOL #4) 300-60 mg Tab, TAKE 1 TABLET BY MOUTH EVERY 12 HOURS AS NEEDED FOR KNEE PAIN, Disp: , Rfl:     amoxicillin-clavulanate 875-125mg (AUGMENTIN) 875-125 mg per tablet, Take 1 tablet by mouth 2 (two) times daily., Disp: 14 tablet, Rfl: 0    augmented betamethasone dipropionate (DIPROLENE-AF) 0.05 % cream, Apply to thickened areas twice daily x 4 wks, tk 1 wk off then repeat until skin returns to normal texture, Disp: 50 g, Rfl: 2    benazepriL (LOTENSIN) 20 MG tablet, Take 1 tablet (20 mg total) by mouth once daily., Disp: 90 tablet, Rfl: 1    blood sugar diagnostic Strp, To check BG 3 times daily, to use with insurance preferred meter, Disp: 100 each, Rfl: 0    blood-glucose meter kit, To check BG 3 times daily, to use with insurance preferred meter, Disp: 1 each, Rfl: 0    ciclopirox 1 % shampoo, Wash scalp let sit 3 minutes then rinse every other night, Disp: 120 mL, Rfl: 11    clobetasoL (TEMOVATE) 0.05 % external solution, APPLY TWICE A DAY TO HEAD WHEN ITCHING, Disp: , Rfl:     docusate sodium (COLACE) 100 MG capsule, Take 1 capsule (100 mg total) by mouth 2 (two) times daily., Disp: 60 capsule, Rfl: 2    doxycycline (VIBRAMYCIN) 100 MG Cap, 1 po bid with food x 7 days prn scalp folliculitis/breakouts, Disp: 14 capsule, Rfl: 3    furosemide (LASIX) 40 MG tablet, Take 1 tablet (40 mg total) by mouth daily as needed. (Patient not taking: No sig reported), Disp: 30 tablet, Rfl: 3    insulin glargine, TOUJEO, (TOUJEO SOLOSTAR U-300 INSULIN) 300 unit/mL (1.5 mL) InPn pen,  "Inject 24 Units into the skin once daily., Disp: 3 pen, Rfl: 0    lancets Misc, To check BG 3 times daily, to use with insurance preferred meter, Disp: 100 each, Rfl: 0    linaCLOtide (LINZESS) 145 mcg Cap capsule, Take 1 capsule (145 mcg total) by mouth before breakfast., Disp: 90 capsule, Rfl: 1    lovastatin (MEVACOR) 20 MG tablet, Take 1 tablet (20 mg total) by mouth every evening., Disp: 90 tablet, Rfl: 1    pen needle, diabetic 32 gauge x 5/32" Ndle, 1 each by Misc.(Non-Drug; Combo Route) route once daily at 6am., Disp: 100 each, Rfl: 3    polyethylene glycol (GLYCOLAX) 17 gram PwPk, Take 17 g by mouth once daily., Disp: 100 each, Rfl: 2    SSD 1 % cream, APPLY 1 APPLICATION TOPICALLY TWICE A DAY, Disp: , Rfl:     triamcinolone acetonide 0.1% (KENALOG) 0.1 % cream, APPLY 1 APPLICATION ON THE SKIN TWICE A DAY AS NEEDED DO NOT APPLY TO FACE, AXILLA OR GROIN, Disp: , Rfl:     warfarin (COUMADIN) 5 MG tablet, Take 2.5 mg by mouth once daily., Disp: , Rfl:     zolpidem (AMBIEN) 10 mg Tab, Take 1 tablet (10 mg total) by mouth nightly as needed (sleep)., Disp: 90 tablet, Rfl: 0    Review of Systems   Constitutional:  Negative for fatigue, fever and unexpected weight change.   HENT:  Negative for ear pain, sinus pressure and sore throat.    Eyes:  Negative for pain.   Respiratory:  Negative for cough and shortness of breath.    Cardiovascular:  Negative for chest pain and leg swelling.   Gastrointestinal:  Negative for abdominal pain, constipation, nausea and vomiting.   Genitourinary:  Negative for dysuria, frequency and urgency.   Musculoskeletal:  Negative for arthralgias.   Skin:  Positive for rash.   Neurological:  Negative for dizziness, weakness and headaches.   Psychiatric/Behavioral:  Negative for sleep disturbance.         Objective:      Vitals:    05/11/23 1044   BP: 110/60   Pulse: 86   SpO2: 98%   Weight: 112 kg (247 lb)   Height: 5' 11" (1.803 m)     Physical Exam  Vitals and nursing note reviewed. " "  Constitutional:       General: He is not in acute distress.     Appearance: Normal appearance. He is well-developed. He is obese.   HENT:      Right Ear: External ear normal.      Left Ear: External ear normal.   Eyes:      Pupils: Pupils are equal, round, and reactive to light.   Neck:      Trachea: No tracheal deviation.   Cardiovascular:      Rate and Rhythm: Normal rate and regular rhythm.      Heart sounds: No murmur heard.    No friction rub. No gallop.   Pulmonary:      Breath sounds: Normal breath sounds. No stridor. No wheezing or rales.   Abdominal:      Palpations: Abdomen is soft. There is no mass.      Tenderness: There is no abdominal tenderness.   Musculoskeletal:         General: No tenderness or deformity.      Cervical back: Neck supple.   Lymphadenopathy:      Cervical: No cervical adenopathy.   Skin:     General: Skin is warm and dry.      Comments: Does not want to show today since "dr hernandez treating'   Neurological:      Mental Status: He is alert and oriented to person, place, and time.      Coordination: Coordination normal.   Psychiatric:         Thought Content: Thought content normal.         Assessment:       1. Paroxysmal atrial fibrillation    2. Type 2 diabetes mellitus with other specified complication, with long-term current use of insulin    3. Chronic kidney disease (CKD), stage IV (severe)    4. Coronary artery disease, unspecified vessel or lesion type, unspecified whether angina present, unspecified whether native or transplanted heart    5. Hyperlipidemia, unspecified hyperlipidemia type    6. Obesity, unspecified classification, unspecified obesity type, unspecified whether serious comorbidity present    7. Hypertension, unspecified type         Plan:       Paroxysmal atrial fibrillation  Comments:  coumadin adjusted. missed last lab draw with dr. salazar  Orders:  -     POCT PT/INR    Type 2 diabetes mellitus with other specified complication, with long-term current use of " insulin  Comments:  toujeo 24 units  Orders:  -     Hemoglobin A1C, POCT    Chronic kidney disease (CKD), stage IV (severe)    Coronary artery disease, unspecified vessel or lesion type, unspecified whether angina present, unspecified whether native or transplanted heart    Hyperlipidemia, unspecified hyperlipidemia type    Obesity, unspecified classification, unspecified obesity type, unspecified whether serious comorbidity present    Hypertension, unspecified type      Follow up in about 2 months (around 7/11/2023).        5/21/2023 Leana Perez

## 2023-06-21 ENCOUNTER — TELEPHONE (OUTPATIENT)
Dept: FAMILY MEDICINE | Facility: CLINIC | Age: 88
End: 2023-06-21

## 2023-06-21 NOTE — TELEPHONE ENCOUNTER
----- Message from Kyleigh Quintana sent at 6/21/2023  9:36 AM CDT -----    FYI : Patient called to confirm his appointment and the time . Advised the patient of his time for his appointment tomorrow and confirm the appointment, and he stated that he will be in tomorrow. No need to contact the patient

## 2023-06-22 ENCOUNTER — OFFICE VISIT (OUTPATIENT)
Dept: FAMILY MEDICINE | Facility: CLINIC | Age: 88
End: 2023-06-22
Payer: MEDICARE

## 2023-06-22 ENCOUNTER — TELEPHONE (OUTPATIENT)
Dept: FAMILY MEDICINE | Facility: CLINIC | Age: 88
End: 2023-06-22

## 2023-06-22 VITALS
DIASTOLIC BLOOD PRESSURE: 70 MMHG | BODY MASS INDEX: 32.9 KG/M2 | SYSTOLIC BLOOD PRESSURE: 130 MMHG | HEIGHT: 71 IN | WEIGHT: 235 LBS | HEART RATE: 72 BPM

## 2023-06-22 DIAGNOSIS — N18.4 CKD (CHRONIC KIDNEY DISEASE) STAGE 4, GFR 15-29 ML/MIN: ICD-10-CM

## 2023-06-22 DIAGNOSIS — E78.5 HYPERLIPIDEMIA, UNSPECIFIED HYPERLIPIDEMIA TYPE: ICD-10-CM

## 2023-06-22 DIAGNOSIS — Z13.5 DIABETIC RETINOPATHY SCREENING: ICD-10-CM

## 2023-06-22 DIAGNOSIS — I25.10 CORONARY ARTERY DISEASE, UNSPECIFIED VESSEL OR LESION TYPE, UNSPECIFIED WHETHER ANGINA PRESENT, UNSPECIFIED WHETHER NATIVE OR TRANSPLANTED HEART: ICD-10-CM

## 2023-06-22 DIAGNOSIS — I48.0 PAROXYSMAL ATRIAL FIBRILLATION: ICD-10-CM

## 2023-06-22 DIAGNOSIS — E11.69 TYPE 2 DIABETES MELLITUS WITH OTHER SPECIFIED COMPLICATION, WITH LONG-TERM CURRENT USE OF INSULIN: Primary | ICD-10-CM

## 2023-06-22 DIAGNOSIS — Z79.4 TYPE 2 DIABETES MELLITUS WITH OTHER SPECIFIED COMPLICATION, WITH LONG-TERM CURRENT USE OF INSULIN: Primary | ICD-10-CM

## 2023-06-22 DIAGNOSIS — C61 PROSTATE CANCER: ICD-10-CM

## 2023-06-22 DIAGNOSIS — Z79.899 HIGH RISK MEDICATION USE: ICD-10-CM

## 2023-06-22 DIAGNOSIS — R60.9 EDEMA, UNSPECIFIED TYPE: ICD-10-CM

## 2023-06-22 LAB — HBA1C MFR BLD: 8.5 %

## 2023-06-22 PROCEDURE — 99214 PR OFFICE/OUTPT VISIT, EST, LEVL IV, 30-39 MIN: ICD-10-PCS | Mod: S$GLB,,, | Performed by: NURSE PRACTITIONER

## 2023-06-22 PROCEDURE — 83036 HEMOGLOBIN GLYCOSYLATED A1C: CPT | Mod: QW,,, | Performed by: NURSE PRACTITIONER

## 2023-06-22 PROCEDURE — 83036 POCT HEMOGLOBIN A1C: ICD-10-PCS | Mod: QW,,, | Performed by: NURSE PRACTITIONER

## 2023-06-22 PROCEDURE — 99214 OFFICE O/P EST MOD 30 MIN: CPT | Mod: S$GLB,,, | Performed by: NURSE PRACTITIONER

## 2023-06-22 RX ORDER — TORSEMIDE 20 MG/1
20 TABLET ORAL DAILY
Qty: 90 TABLET | Refills: 1 | Status: SHIPPED | OUTPATIENT
Start: 2023-06-22

## 2023-06-22 RX ORDER — FUROSEMIDE 40 MG/1
40 TABLET ORAL DAILY PRN
Qty: 30 TABLET | Refills: 3 | Status: CANCELLED | OUTPATIENT
Start: 2023-06-22 | End: 2024-06-21

## 2023-06-22 RX ORDER — LOVASTATIN 20 MG/1
20 TABLET ORAL NIGHTLY
Qty: 90 TABLET | Refills: 1 | Status: SHIPPED | OUTPATIENT
Start: 2023-06-22 | End: 2024-01-08 | Stop reason: SDUPTHER

## 2023-06-22 RX ORDER — BENAZEPRIL HYDROCHLORIDE 10 MG/1
10 TABLET ORAL
COMMUNITY
Start: 2023-05-30

## 2023-06-22 NOTE — TELEPHONE ENCOUNTER
LMOR letting pt know we got him scheduled for the 6 week f/u and to call back if that doesn't work.

## 2023-06-22 NOTE — TELEPHONE ENCOUNTER
----- Message from Claudia Chung sent at 6/22/2023  2:13 PM CDT -----  The patient saw Leana today. She needs to see him in 6 weeks. He likes around 9 jazmin appointments. Pt's #   [phone]

## 2023-06-23 ENCOUNTER — TELEPHONE (OUTPATIENT)
Dept: FAMILY MEDICINE | Facility: CLINIC | Age: 88
End: 2023-06-23
Payer: MEDICARE

## 2023-06-23 ENCOUNTER — TELEPHONE (OUTPATIENT)
Dept: FAMILY MEDICINE | Facility: CLINIC | Age: 88
End: 2023-06-23

## 2023-06-23 LAB
ALBUMIN SERPL-MCNC: 4 G/DL (ref 3.6–5.1)
ALBUMIN/GLOB SERPL: 0.9 (CALC) (ref 1–2.5)
ALP SERPL-CCNC: 86 U/L (ref 35–144)
ALT SERPL-CCNC: 6 U/L (ref 9–46)
APPEARANCE UR: ABNORMAL
AST SERPL-CCNC: 11 U/L (ref 10–35)
BILIRUB SERPL-MCNC: 0.5 MG/DL (ref 0.2–1.2)
BILIRUB UR QL STRIP: NEGATIVE
BUN SERPL-MCNC: 41 MG/DL (ref 7–25)
BUN/CREAT SERPL: 15 (CALC) (ref 6–22)
CALCIUM SERPL-MCNC: 9 MG/DL (ref 8.6–10.3)
CHLORIDE SERPL-SCNC: 102 MMOL/L (ref 98–110)
CO2 SERPL-SCNC: 26 MMOL/L (ref 20–32)
COLOR UR: YELLOW
CREAT SERPL-MCNC: 2.68 MG/DL (ref 0.7–1.22)
EGFR: 23 ML/MIN/1.73M2
GLOBULIN SER CALC-MCNC: 4.4 G/DL (CALC) (ref 1.9–3.7)
GLUCOSE SERPL-MCNC: 193 MG/DL (ref 65–99)
GLUCOSE UR QL STRIP: ABNORMAL
HGB UR QL STRIP: ABNORMAL
KETONES UR QL STRIP: NEGATIVE
LEUKOCYTE ESTERASE UR QL STRIP: ABNORMAL
NITRITE UR QL STRIP: NEGATIVE
PH UR STRIP: 5.5 [PH] (ref 5–8)
POTASSIUM SERPL-SCNC: 4.3 MMOL/L (ref 3.5–5.3)
PROT SERPL-MCNC: 8.4 G/DL (ref 6.1–8.1)
PROT UR QL STRIP: ABNORMAL
SODIUM SERPL-SCNC: 139 MMOL/L (ref 135–146)
SP GR UR STRIP: 1.01 (ref 1–1.03)

## 2023-06-23 NOTE — TELEPHONE ENCOUNTER
Kidney function shows no improvement. Pain with urination. ? Appears has uti? Needs to see dr. Morrison. Do we need to schedule. Send message back.

## 2023-06-23 NOTE — TELEPHONE ENCOUNTER
Spoke with patient who states he did call Dr. Morrison and they have him scheduled mid August. Also states he is not having any pain or burning upon urination at all.

## 2023-06-23 NOTE — TELEPHONE ENCOUNTER
----- Message from Kyleigh Quintana sent at 6/23/2023 11:51 AM CDT -----  Patient called and stated that he missed a call from the office and he was returning a call please give him a call at [phone]

## 2023-06-26 ENCOUNTER — TELEPHONE (OUTPATIENT)
Dept: FAMILY MEDICINE | Facility: CLINIC | Age: 88
End: 2023-06-26

## 2023-06-26 NOTE — TELEPHONE ENCOUNTER
Spoke with patient who states he was scheduled with Dr. Morrison on August 30th and he doesn't know if he needs to be seen sooner.

## 2023-06-26 NOTE — TELEPHONE ENCOUNTER
----- Message from Kaity Cowart sent at 6/26/2023  8:04 AM CDT -----  Pt calling concerning his aug 30 appt. Pt #[phone]

## 2023-06-26 NOTE — TELEPHONE ENCOUNTER
----- Message from Kyleigh Quintana sent at 6/26/2023  9:19 AM CDT -----  FYI:patient called and stated he was calling Caryl back and he missed the call. No patient call back is needed

## 2023-06-28 ENCOUNTER — TELEPHONE (OUTPATIENT)
Dept: FAMILY MEDICINE | Facility: CLINIC | Age: 88
End: 2023-06-28

## 2023-06-28 NOTE — TELEPHONE ENCOUNTER
----- Message from Yuri Crneshaw MA sent at 6/28/2023  8:22 AM CDT -----  Regarding: return call  Pt calling stating he is returning a call to 's nurse and that the office has been trying to reach him since Friday.

## 2023-06-28 NOTE — TELEPHONE ENCOUNTER
LMOR letting pt know we are working on getting him scheduled sooner with someone. Reminder created to do that tomorrow.

## 2023-06-29 ENCOUNTER — TELEPHONE (OUTPATIENT)
Dept: FAMILY MEDICINE | Facility: CLINIC | Age: 88
End: 2023-06-29

## 2023-06-29 NOTE — TELEPHONE ENCOUNTER
----- Message from Caryl Shine MA sent at 6/28/2023  4:43 PM CDT -----  Get patient scheduled with Dr. Martinez

## 2023-06-29 NOTE — TELEPHONE ENCOUNTER
Dr. Martinez's office said they require the patient to call for the appointment but that all 3 providers in that office are also booked until August.

## 2023-07-05 DIAGNOSIS — N18.4 CHRONIC KIDNEY DISEASE, STAGE IV (SEVERE): Primary | ICD-10-CM

## 2023-07-06 NOTE — PROGRESS NOTES
SUBJECTIVE:    Patient ID: Alex Hatch is a 88 y.o. male.    Chief Complaint: Follow-up (6wk, no bottles, brought list and went over verbally, Eye exam referral set up// SW)    88-year-old male presents for check up.  Treated for htn, hyperlipidemia, atrial fibrillation, cad, ckd, dm2 and bph. He is following up for uncontrolled diabetes. Today a1c is down to 8.5mg/dl he reports trying to watch diet.  Sees dr. Lux regularly. Inr is monitored at his office. Using cane for ambulation. Seeing dr. Scott for rash on backside. 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 Started on doxycycline Does feel like improving   sees dr arrieta for Onychomycosis every 6 months.   Followed by dr. Skaggs for prostate cancer. Had turp 10/21     F      Office Visit on 06/22/2023   Component Date Value Ref Range Status    Glucose 06/22/2023 193 (H)  65 - 99 mg/dL Final    BUN 06/22/2023 41 (H)  7 - 25 mg/dL Final    Creatinine 06/22/2023 2.68 (H)  0.70 - 1.22 mg/dL Final    eGFR 06/22/2023 23 (L)  > OR = 60 mL/min/1.73m2 Final    BUN/Creatinine Ratio 06/22/2023 15  6 - 22 (calc) Final    Sodium 06/22/2023 139  135 - 146 mmol/L Final    Potassium 06/22/2023 4.3  3.5 - 5.3 mmol/L Final    Chloride 06/22/2023 102  98 - 110 mmol/L Final    CO2 06/22/2023 26  20 - 32 mmol/L Final    Calcium 06/22/2023 9.0  8.6 - 10.3 mg/dL Final    Total Protein 06/22/2023 8.4 (H)  6.1 - 8.1 g/dL Final    Albumin 06/22/2023 4.0  3.6 - 5.1 g/dL Final    Globulin, Total 06/22/2023 4.4 (H)  1.9 - 3.7 g/dL (calc) Final    Albumin/Globulin Ratio 06/22/2023 0.9 (L)  1.0 - 2.5 (calc) Final    Total Bilirubin 06/22/2023 0.5  0.2 - 1.2 mg/dL Final    Alkaline Phosphatase 06/22/2023 86  35 - 144 U/L Final    AST 06/22/2023 11  10 - 35 U/L Final    ALT 06/22/2023 6 (L)  9 - 46 U/L Final    Color, UA 06/22/2023 YELLOW  YELLOW Final    Appearance, UA 06/22/2023 CLOUDY (A)  CLEAR  Final    Specific Gravity, UA 06/22/2023 1.010  1.001 - 1.035 Final    pH, UA 06/22/2023 5.5  5.0 - 8.0 Final    Glucose, UA 06/22/2023 2+ (A)  NEGATIVE Final    Bilirubin, UA 06/22/2023 NEGATIVE  NEGATIVE Final    Ketones, UA 06/22/2023 NEGATIVE  NEGATIVE Final    Occult Blood UA 06/22/2023 1+ (A)  NEGATIVE Final    Protein, UA 06/22/2023 1+ (A)  NEGATIVE Final    Nitrite, UA 06/22/2023 NEGATIVE  NEGATIVE Final    Leukocytes, UA 06/22/2023 3+ (A)  NEGATIVE Final    Hemoglobin A1C, POC 06/22/2023 8.5  % Final   Office Visit on 05/11/2023   Component Date Value Ref Range Status    INR 05/11/2023 4.1 (A)  2.0 - 3.0 Final     Acceptable 05/11/2023 Yes   Final    Protime 05/11/2023 48.6 (A)  2 - 3 Final    Hemoglobin A1C, POC 05/11/2023 9.0  % Final   Office Visit on 04/13/2023   Component Date Value Ref Range Status    Hemoglobin A1C, POC 04/13/2023 9.9  % Final   Office Visit on 01/03/2023   Component Date Value Ref Range Status    Hemoglobin A1C, POC 01/03/2023 8.4  % Final    WBC 04/03/2023 7.9  3.8 - 10.8 Thousand/uL Final    RBC 04/03/2023 4.81  4.20 - 5.80 Million/uL Final    Hemoglobin 04/03/2023 13.0 (L)  13.2 - 17.1 g/dL Final    Hematocrit 04/03/2023 40.6  38.5 - 50.0 % Final    MCV 04/03/2023 84.4  80.0 - 100.0 fL Final    MCH 04/03/2023 27.0  27.0 - 33.0 pg Final    MCHC 04/03/2023 32.0  32.0 - 36.0 g/dL Final    RDW 04/03/2023 13.6  11.0 - 15.0 % Final    Platelets 04/03/2023 215  140 - 400 Thousand/uL Final    MPV 04/03/2023 9.9  7.5 - 12.5 fL Final    Neutrophils, Abs 04/03/2023 4,424  1,500 - 7,800 cells/uL Final    Lymph # 04/03/2023 2,275  850 - 3,900 cells/uL Final    Mono # 04/03/2023 743  200 - 950 cells/uL Final    Eos # 04/03/2023 379  15 - 500 cells/uL Final    Baso # 04/03/2023 79  0 - 200 cells/uL Final    Neutrophils Relative 04/03/2023 56  % Final    Lymph % 04/03/2023 28.8  % Final    Mono % 04/03/2023 9.4  % Final    Eosinophil % 04/03/2023 4.8  % Final    Basophil %  04/03/2023 1.0  % Final    Glucose 04/03/2023 174 (H)  65 - 99 mg/dL Final    BUN 04/03/2023 43 (H)  7 - 25 mg/dL Final    Creatinine 04/03/2023 2.42 (H)  0.70 - 1.22 mg/dL Final    eGFR 04/03/2023 26 (L)  > OR = 60 mL/min/1.73m2 Final    BUN/Creatinine Ratio 04/03/2023 18  6 - 22 (calc) Final    Sodium 04/03/2023 140  135 - 146 mmol/L Final    Potassium 04/03/2023 4.7  3.5 - 5.3 mmol/L Final    Chloride 04/03/2023 105  98 - 110 mmol/L Final    CO2 04/03/2023 26  20 - 32 mmol/L Final    Calcium 04/03/2023 8.8  8.6 - 10.3 mg/dL Final    Total Protein 04/03/2023 7.0  6.1 - 8.1 g/dL Final    Albumin 04/03/2023 4.0  3.6 - 5.1 g/dL Final    Globulin, Total 04/03/2023 3.0  1.9 - 3.7 g/dL (calc) Final    Albumin/Globulin Ratio 04/03/2023 1.3  1.0 - 2.5 (calc) Final    Total Bilirubin 04/03/2023 0.4  0.2 - 1.2 mg/dL Final    Alkaline Phosphatase 04/03/2023 90  35 - 144 U/L Final    AST 04/03/2023 9 (L)  10 - 35 U/L Final    ALT 04/03/2023 7 (L)  9 - 46 U/L Final    Cholesterol 04/03/2023 128  <200 mg/dL Final    HDL 04/03/2023 42  > OR = 40 mg/dL Final    Triglycerides 04/03/2023 72  <150 mg/dL Final    LDL Cholesterol 04/03/2023 71  mg/dL (calc) Final    HDL/Cholesterol Ratio 04/03/2023 3.0  <5.0 (calc) Final    Non HDL Chol. (LDL+VLDL) 04/03/2023 86  <130 mg/dL (calc) Final    TSH w/reflex to FT4 04/03/2023 6.12 (H)  0.40 - 4.50 mIU/L Final    T4, Free 04/03/2023 1.4  0.8 - 1.8 ng/dL Final    Creatinine, Urine 04/03/2023 96  20 - 320 mg/dL Final    Microalb, Ur 04/03/2023 26.7  See Note: mg/dL Final    Microalb/Creat Ratio 04/03/2023 278 (H)  <30 mcg/mg creat Final    Color, UA 04/03/2023 YELLOW  YELLOW Final    Appearance, UA 04/03/2023 CLOUDY (A)  CLEAR Final    Specific Gravity, UA 04/03/2023 1.017  1.001 - 1.035 Final    pH, UA 04/03/2023 < OR = 5.0  5.0 - 8.0 Final    Glucose, UA 04/03/2023 1+ (A)  NEGATIVE Final    Bilirubin, UA 04/03/2023 NEGATIVE  NEGATIVE Final    Ketones, UA 04/03/2023 NEGATIVE  NEGATIVE Final     Occult Blood UA 04/03/2023 2+ (A)  NEGATIVE Final    Protein, UA 04/03/2023 2+ (A)  NEGATIVE Final    Nitrite, UA 04/03/2023 NEGATIVE  NEGATIVE Final    Leukocytes, UA 04/03/2023 2+ (A)  NEGATIVE Final    WBC Casts, UA 04/03/2023 > OR = 60 (A)  < OR = 5 /HPF Final    RBC Casts, UA 04/03/2023 20-40 (A)  < OR = 2 /HPF Final    Squam Epithel, UA 04/03/2023 NONE SEEN  < OR = 5 /HPF Final    Bacteria, UA 04/03/2023 MODERATE (A)  NONE SEEN /HPF Final    Hyaline Casts, UA 04/03/2023 NONE SEEN  NONE SEEN /LPF Final    Yeast, UA 04/03/2023 NONE SEEN  NONE SEEN /HPF Final    Service Cmt: 04/03/2023    Final    Reflexive Urine Culture 04/03/2023    Final    Urine Culture, Routine 04/03/2023  (A)   Final       Past Medical History:   Diagnosis Date    Atrial fibrillation     Dr Lux    CKD (chronic kidney disease), stage III     Coronary artery disease     Diabetes mellitus, type 2     Hyperlipidemia     Hypertension      Social History     Socioeconomic History    Marital status:    Tobacco Use    Smoking status: Never    Smokeless tobacco: Never   Substance and Sexual Activity    Alcohol use: No    Drug use: No     Past Surgical History:   Procedure Laterality Date    CARDIAC PACEMAKER PLACEMENT  5/2013    Dr Lux    COLONOSCOPY W/ POLYPECTOMY  2011    Dr Mar, repeat in 3 years    CORONARY ARTERY BYPASS GRAFT  1993    CYSTOSCOPY N/A 9/29/2021    Procedure: CYSTOSCOPY;  Surgeon: Ramona Skaggs Jr., MD;  Location: [facility] OR;  Service: Urology;  Laterality: N/A;    cassandra      TRANSRECTAL ULTRASOUND EXAMINATION N/A 9/29/2021    Procedure: ULTRASOUND, RECTAL APPROACH;  Surgeon: Ramona Skaggs Jr., MD;  Location: [facility] OR;  Service: Urology;  Laterality: N/A;    TRANSURETHRAL RESECTION OF PROSTATE N/A 10/26/2021    Procedure: TURP (TRANSURETHRAL RESECTION OF PROSTATE);  Surgeon: Ramona Skaggs Jr., MD;  Location: [facility] OR;  Service: Urology;  Laterality: N/A;     History reviewed. No pertinent family  history.    Review of patient's allergies indicates:   Allergen Reactions    Macrobid [nitrofurantoin monohyd/m-cryst]     Iodinated contrast media      Betadine ok    Sitagliptin phosphate      Other reaction(s): Other (See Comments)       Current Outpatient Medications:     acetaminophen-codeine 300-60mg (TYLENOL #4) 300-60 mg Tab, TAKE 1 TABLET BY MOUTH EVERY 12 HOURS AS NEEDED FOR KNEE PAIN, Disp: , Rfl:     amoxicillin-clavulanate 875-125mg (AUGMENTIN) 875-125 mg per tablet, Take 1 tablet by mouth 2 (two) times daily., Disp: 14 tablet, Rfl: 0    augmented betamethasone dipropionate (DIPROLENE-AF) 0.05 % cream, Apply to thickened areas twice daily x 4 wks, tk 1 wk off then repeat until skin returns to normal texture, Disp: 50 g, Rfl: 2    blood sugar diagnostic Strp, To check BG 3 times daily, to use with insurance preferred meter, Disp: 100 each, Rfl: 0    blood-glucose meter kit, To check BG 3 times daily, to use with insurance preferred meter, Disp: 1 each, Rfl: 0    ciclopirox 1 % shampoo, Wash scalp let sit 3 minutes then rinse every other night, Disp: 120 mL, Rfl: 11    clobetasoL (TEMOVATE) 0.05 % external solution, APPLY TWICE A DAY TO HEAD WHEN ITCHING, Disp: , Rfl:     docusate sodium (COLACE) 100 MG capsule, Take 1 capsule (100 mg total) by mouth 2 (two) times daily., Disp: 60 capsule, Rfl: 2    doxycycline (VIBRAMYCIN) 100 MG Cap, 1 po bid with food x 7 days prn scalp folliculitis/breakouts, Disp: 14 capsule, Rfl: 3    furosemide (LASIX) 40 MG tablet, Take 1 tablet (40 mg total) by mouth daily as needed., Disp: 30 tablet, Rfl: 3    insulin glargine, TOUJEO, (TOUJEO SOLOSTAR U-300 INSULIN) 300 unit/mL (1.5 mL) InPn pen, Inject 24 Units into the skin once daily., Disp: 3 pen, Rfl: 0    lancets Misc, To check BG 3 times daily, to use with insurance preferred meter, Disp: 100 each, Rfl: 0    linaCLOtide (LINZESS) 145 mcg Cap capsule, Take 1 capsule (145 mcg total) by mouth before breakfast., Disp: 90  "capsule, Rfl: 1    pen needle, diabetic 32 gauge x 5/32" Ndle, 1 each by Misc.(Non-Drug; Combo Route) route once daily at 6am., Disp: 100 each, Rfl: 3    polyethylene glycol (GLYCOLAX) 17 gram PwPk, Take 17 g by mouth once daily., Disp: 100 each, Rfl: 2    SSD 1 % cream, APPLY 1 APPLICATION TOPICALLY TWICE A DAY, Disp: , Rfl:     triamcinolone acetonide 0.1% (KENALOG) 0.1 % cream, APPLY 1 APPLICATION ON THE SKIN TWICE A DAY AS NEEDED DO NOT APPLY TO FACE, AXILLA OR GROIN, Disp: , Rfl:     warfarin (COUMADIN) 5 MG tablet, Take 2.5 mg by mouth once daily., Disp: , Rfl:     zolpidem (AMBIEN) 10 mg Tab, Take 1 tablet (10 mg total) by mouth nightly as needed (sleep)., Disp: 90 tablet, Rfl: 0    benazepriL (LOTENSIN) 10 MG tablet, Take 10 mg by mouth., Disp: , Rfl:     lovastatin (MEVACOR) 20 MG tablet, Take 1 tablet (20 mg total) by mouth every evening., Disp: 90 tablet, Rfl: 1    torsemide (DEMADEX) 20 MG Tab, Take 1 tablet (20 mg total) by mouth once daily., Disp: 90 tablet, Rfl: 1    Review of Systems   Constitutional:  Negative for fatigue, fever and unexpected weight change.   HENT:  Negative for ear pain, sinus pressure and sore throat.    Eyes:  Negative for pain.   Respiratory:  Negative for cough and shortness of breath.    Cardiovascular:  Negative for chest pain and leg swelling.   Gastrointestinal:  Negative for abdominal pain, constipation, nausea and vomiting.   Genitourinary:  Negative for dysuria, frequency and urgency.   Musculoskeletal:  Negative for arthralgias.   Skin:  Negative for rash.   Neurological:  Negative for dizziness, weakness and headaches.   Psychiatric/Behavioral:  Negative for sleep disturbance.         Objective:      Vitals:    06/22/23 1306   BP: 130/70   Pulse: 72   Weight: 106.6 kg (235 lb)   Height: 5' 11" (1.803 m)     Physical Exam  Vitals and nursing note reviewed.   Constitutional:       General: He is not in acute distress.     Appearance: Normal appearance. He is " well-developed. He is obese.   HENT:      Head: Normocephalic and atraumatic.      Right Ear: External ear normal.      Left Ear: External ear normal.   Eyes:      Pupils: Pupils are equal, round, and reactive to light.   Neck:      Trachea: No tracheal deviation.   Cardiovascular:      Rate and Rhythm: Normal rate and regular rhythm.      Heart sounds: No murmur heard.    No friction rub. No gallop.   Pulmonary:      Breath sounds: Normal breath sounds. No stridor. No wheezing or rales.   Abdominal:      Palpations: Abdomen is soft. There is no mass.      Tenderness: There is no abdominal tenderness.   Musculoskeletal:         General: No tenderness or deformity.      Cervical back: Neck supple.   Lymphadenopathy:      Cervical: No cervical adenopathy.   Skin:     General: Skin is warm and dry.   Neurological:      Mental Status: He is alert and oriented to person, place, and time.      Coordination: Coordination normal.   Psychiatric:         Thought Content: Thought content normal.         Assessment:       1. Type 2 diabetes mellitus with other specified complication, with long-term current use of insulin    2. Edema, unspecified type    3. Hyperlipidemia, unspecified hyperlipidemia type    4. High risk medication use    5. Diabetic retinopathy screening    6. CKD (chronic kidney disease) stage 4, GFR 15-29 ml/min    7. Paroxysmal atrial fibrillation    8. Coronary artery disease, unspecified vessel or lesion type, unspecified whether angina present, unspecified whether native or transplanted heart    9. Prostate cancer         Plan:       Type 2 diabetes mellitus with other specified complication, with long-term current use of insulin  Comments:  hga1c is down to 8.5  Orders:  -     Hemoglobin A1C, POCT    Edema, unspecified type  Comments:  elevate compression socks  Orders:  -     Ambulatory referral/consult to Ophthalmology; Future; Expected date: 06/29/2023  -     lovastatin (MEVACOR) 20 MG tablet; Take 1  tablet (20 mg total) by mouth every evening.  Dispense: 90 tablet; Refill: 1  -     torsemide (DEMADEX) 20 MG Tab; Take 1 tablet (20 mg total) by mouth once daily.  Dispense: 90 tablet; Refill: 1  -     Comprehensive Metabolic Panel; Future; Expected date: 06/22/2023  -     Urinalysis; Future; Expected date: 06/22/2023    Hyperlipidemia, unspecified hyperlipidemia type  Comments:  continue lovastatin  Orders:  -     lovastatin (MEVACOR) 20 MG tablet; Take 1 tablet (20 mg total) by mouth every evening.  Dispense: 90 tablet; Refill: 1    High risk medication use  -     lovastatin (MEVACOR) 20 MG tablet; Take 1 tablet (20 mg total) by mouth every evening.  Dispense: 90 tablet; Refill: 1    Diabetic retinopathy screening  -     Ambulatory referral/consult to Ophthalmology; Future; Expected date: 06/29/2023    CKD (chronic kidney disease) stage 4, GFR 15-29 ml/min  Comments:  repeat labs today. gfr has continued to decrease   Orders:  -     torsemide (DEMADEX) 20 MG Tab; Take 1 tablet (20 mg total) by mouth once daily.  Dispense: 90 tablet; Refill: 1  -     Comprehensive Metabolic Panel; Future; Expected date: 06/22/2023  -     Urinalysis; Future; Expected date: 06/22/2023  -     Ambulatory referral/consult to Nephrology; Future; Expected date: 06/29/2023    Paroxysmal atrial fibrillation  Comments:  on coumadin. inr monitored by dr. salazar    Coronary artery disease, unspecified vessel or lesion type, unspecified whether angina present, unspecified whether native or transplanted heart  Comments:  followed by dr. salazar    Prostate cancer  Comments:  followed by dr. montoya      Follow up in about 4 weeks (around 7/20/2023), or if symptoms worsen or fail to improve, for medication management.        7/5/2023 Leana Perez

## 2023-07-20 ENCOUNTER — HOSPITAL ENCOUNTER (OUTPATIENT)
Dept: RADIOLOGY | Facility: HOSPITAL | Age: 88
Discharge: HOME | End: 2023-07-20
Attending: INTERNAL MEDICINE
Payer: MEDICARE

## 2023-07-20 DIAGNOSIS — N18.4 CHRONIC KIDNEY DISEASE, STAGE IV (SEVERE): ICD-10-CM

## 2023-07-20 DIAGNOSIS — E11.65 UNCONTROLLED TYPE 2 DIABETES MELLITUS WITH HYPERGLYCEMIA: ICD-10-CM

## 2023-07-20 PROCEDURE — 76770 US EXAM ABDO BACK WALL COMP: CPT | Mod: TC,PO

## 2023-07-20 PROCEDURE — 76857 US EXAM PELVIC LIMITED: CPT | Mod: TC,PO

## 2023-07-20 RX ORDER — INSULIN GLARGINE 300 U/ML
25 INJECTION, SOLUTION SUBCUTANEOUS DAILY
Qty: 3 PEN | Refills: 4 | Status: SHIPPED | OUTPATIENT
Start: 2023-07-20 | End: 2023-09-14 | Stop reason: SDUPTHER

## 2023-07-20 NOTE — TELEPHONE ENCOUNTER
----- Message from Claudia Chung sent at 7/20/2023 12:35 PM CDT -----  Refill his Insulin.  He is using 25 units  per injection. [facility] [address].  Pt's # [phone]

## 2023-08-02 ENCOUNTER — OFFICE VISIT (OUTPATIENT)
Dept: UROLOGY | Facility: CLINIC | Age: 88
End: 2023-08-02
Payer: MEDICARE

## 2023-08-02 VITALS
HEART RATE: 82 BPM | SYSTOLIC BLOOD PRESSURE: 100 MMHG | BODY MASS INDEX: 32.9 KG/M2 | WEIGHT: 235 LBS | DIASTOLIC BLOOD PRESSURE: 63 MMHG | HEIGHT: 71 IN

## 2023-08-02 DIAGNOSIS — R33.9 URINE RETENTION: Primary | ICD-10-CM

## 2023-08-02 DIAGNOSIS — C61 PROSTATE CANCER: ICD-10-CM

## 2023-08-02 LAB — POC RESIDUAL URINE VOLUME: 0 ML (ref 0–100)

## 2023-08-02 PROCEDURE — 99214 OFFICE O/P EST MOD 30 MIN: CPT | Mod: PBBFAC,PO | Performed by: UROLOGY

## 2023-08-02 PROCEDURE — 99214 PR OFFICE/OUTPT VISIT, EST, LEVL IV, 30-39 MIN: ICD-10-PCS | Mod: S$PBB,,, | Performed by: UROLOGY

## 2023-08-02 PROCEDURE — 99999 PR PBB SHADOW E&M-EST. PATIENT-LVL IV: ICD-10-PCS | Mod: PBBFAC,,, | Performed by: UROLOGY

## 2023-08-02 PROCEDURE — 99999PBSHW POCT BLADDER SCAN: Mod: PBBFAC,,,

## 2023-08-02 PROCEDURE — 51798 US URINE CAPACITY MEASURE: CPT | Mod: PBBFAC,PO | Performed by: UROLOGY

## 2023-08-02 PROCEDURE — 99214 OFFICE O/P EST MOD 30 MIN: CPT | Mod: S$PBB,,, | Performed by: UROLOGY

## 2023-08-02 PROCEDURE — 99999 PR PBB SHADOW E&M-EST. PATIENT-LVL IV: CPT | Mod: PBBFAC,,, | Performed by: UROLOGY

## 2023-08-02 PROCEDURE — 99999PBSHW POCT BLADDER SCAN: ICD-10-PCS | Mod: PBBFAC,,,

## 2023-08-02 NOTE — PROGRESS NOTES
[facility] Established Patient/H&P:    Alex Hatch is a 88 y.o. male who presents for follow up for acute urinary retention and prostate cancer.     Patient with a history of atrial fibrillation on Coumadin, DM and HTN who has a history of acute urinary retention. He presented to the emergency department on 9/5/21 with an episode of retention. On review of records, he had a perez catheter placed with 350 cc urine obtained. UA negative.     He reports progressively worsening lower urinary tract symptoms with weak stream. He was started on Flomax 0.4 mg per his PCP. States it improved his symptoms somewhat, but he remained with a weak stream.        Interval History     10/14/21: He is s/p cystoscopy and TRUS on 9/29/21 which revealed a 30 gram prostate with mild obstruction. Moderate to severe trabeculations and several small diverticula. Inflammation from indwelling catheter on posterior bladder wall. Remains with a catheter in place.     12/3/21: He is s/p uncomplicated TURP on 10/26/21. Path with a small focus of Delphi grade 3 prostate adenocarcinoma. States that he is voiding well. Has no significant lower urinary tract complaints.      6/3/22: Patient doing well from a urologic standpoint. IPSS 8. PVR 37 mL today. States he had a few episodes of gross hematuria last week that have since resolved. Evaluated by his PCP and found to have an INR of 9 on 6/2/22. He is currently holding his Coumadin and was told this was likely the cause.      Of note, he also believes he may have had Salmonella after eating recently. WBC 17 yesterday. States he had chills.     8/2/23: Here today for follow up. States he is continuing to void well with no complaints. IPSS 9 and PVR 0 mL today. Only with mild frequency and nocturia. He is undergoing evaluation with nephrology for worsening CKD. Renal ultrasound on 7/20/23 with medical renal disease and no hydronephrosis.      Denies any prior episodes of  "urinary retention, gross hematuria, flank pain,  trauma or history of  malignancy.         PSA  0.42                 1/15/14     A1C  8.5  6/22/23  13.9                 6/2/22  9.1                   8/17/21     IPSS    QoL  9 1 8/2/23  8          1          6/3/22     PVR  0 mL  8/2/23  37 mL              6/3/22  30 mL              12/3/21    Past Medical History:   Diagnosis Date    Atrial fibrillation     Dr Lux    CKD (chronic kidney disease), stage III     Coronary artery disease     Diabetes mellitus, type 2     Hyperlipidemia     Hypertension        Past Surgical History:   Procedure Laterality Date    CARDIAC PACEMAKER PLACEMENT  5/2013    Dr Lux    COLONOSCOPY W/ POLYPECTOMY  2011    Dr Mar, repeat in 3 years    CORONARY ARTERY BYPASS GRAFT  1993    CYSTOSCOPY N/A 9/29/2021    Procedure: CYSTOSCOPY;  Surgeon: Ramona Skaggs Jr., MD;  Location: [facility] OR;  Service: Urology;  Laterality: N/A;    cassandra      TRANSRECTAL ULTRASOUND EXAMINATION N/A 9/29/2021    Procedure: ULTRASOUND, RECTAL APPROACH;  Surgeon: Ramona Skaggs Jr., MD;  Location: [facility] OR;  Service: Urology;  Laterality: N/A;    TRANSURETHRAL RESECTION OF PROSTATE N/A 10/26/2021    Procedure: TURP (TRANSURETHRAL RESECTION OF PROSTATE);  Surgeon: Ramona Skaggs Jr., MD;  Location: [facility] OR;  Service: Urology;  Laterality: N/A;       Review of patient's allergies indicates:   Allergen Reactions    Macrobid [nitrofurantoin monohyd/m-cryst]     Iodinated contrast media      Betadine ok    Sitagliptin phosphate      Other reaction(s): Other (See Comments)       Medications Reviewed: see MAR    FOCUSED PHYSICAL EXAM:    Vitals:    08/02/23 0855   BP: 100/63   Pulse: 82     Body mass index is 32.78 kg/m². Weight: 106.6 kg (235 lb) Height: 5' 11" (180.3 cm)       General: Alert, cooperative, no distress, appears stated age  Abdomen: Soft, non-tender, no CVA tenderness, non-distended      LABS:    Recent Results (from the past 336 hour(s))   POCT " Bladder Scan    Collection Time: 08/02/23  9:05 AM   Result Value Ref Range    POC Residual Urine Volume 0 0 - 100 mL           Assessment/Diagnosis:    1. Urine retention  POCT Bladder Scan          Plans:    - I spent 30 minutes of the day of this encounter preparing for, treating and managing this patient. Doing well from a urologic standpoint regarding his LUTS. IPSS has improved. PVR low. No complaints.   - Follow up with nephrology for his CKD.   - Continue observation for his incidental Drift 6 prostate cancer on TURP specimen.   - RTC in 1 year with symptom score and PVR.

## 2023-08-03 ENCOUNTER — OFFICE VISIT (OUTPATIENT)
Dept: FAMILY MEDICINE | Facility: CLINIC | Age: 88
End: 2023-08-03
Payer: MEDICARE

## 2023-08-03 ENCOUNTER — TELEPHONE (OUTPATIENT)
Dept: FAMILY MEDICINE | Facility: CLINIC | Age: 88
End: 2023-08-03

## 2023-08-03 VITALS
HEIGHT: 71 IN | WEIGHT: 242 LBS | BODY MASS INDEX: 33.88 KG/M2 | DIASTOLIC BLOOD PRESSURE: 78 MMHG | HEART RATE: 77 BPM | SYSTOLIC BLOOD PRESSURE: 120 MMHG

## 2023-08-03 DIAGNOSIS — E78.5 HYPERLIPIDEMIA, UNSPECIFIED HYPERLIPIDEMIA TYPE: ICD-10-CM

## 2023-08-03 DIAGNOSIS — I10 HYPERTENSION, UNSPECIFIED TYPE: ICD-10-CM

## 2023-08-03 DIAGNOSIS — I48.0 PAROXYSMAL ATRIAL FIBRILLATION: ICD-10-CM

## 2023-08-03 DIAGNOSIS — N18.4 CHRONIC KIDNEY DISEASE (CKD), STAGE IV (SEVERE): ICD-10-CM

## 2023-08-03 DIAGNOSIS — Z79.4 TYPE 2 DIABETES MELLITUS WITH OTHER SPECIFIED COMPLICATION, WITH LONG-TERM CURRENT USE OF INSULIN: Primary | ICD-10-CM

## 2023-08-03 DIAGNOSIS — E11.69 TYPE 2 DIABETES MELLITUS WITH OTHER SPECIFIED COMPLICATION, WITH LONG-TERM CURRENT USE OF INSULIN: Primary | ICD-10-CM

## 2023-08-03 DIAGNOSIS — I25.10 CORONARY ARTERY DISEASE, UNSPECIFIED VESSEL OR LESION TYPE, UNSPECIFIED WHETHER ANGINA PRESENT, UNSPECIFIED WHETHER NATIVE OR TRANSPLANTED HEART: ICD-10-CM

## 2023-08-03 LAB — HBA1C MFR BLD: 8 %

## 2023-08-03 PROCEDURE — 83036 HEMOGLOBIN GLYCOSYLATED A1C: CPT | Mod: QW,,, | Performed by: NURSE PRACTITIONER

## 2023-08-03 PROCEDURE — 99214 OFFICE O/P EST MOD 30 MIN: CPT | Mod: S$GLB,,, | Performed by: NURSE PRACTITIONER

## 2023-08-03 PROCEDURE — 99214 PR OFFICE/OUTPT VISIT, EST, LEVL IV, 30-39 MIN: ICD-10-PCS | Mod: S$GLB,,, | Performed by: NURSE PRACTITIONER

## 2023-08-03 PROCEDURE — 83036 POCT HEMOGLOBIN A1C: ICD-10-PCS | Mod: QW,,, | Performed by: NURSE PRACTITIONER

## 2023-08-03 NOTE — TELEPHONE ENCOUNTER
----- Message from Agnes Lynn sent at 8/3/2023 11:24 AM CDT -----  Needs a appointment for 6 weeks.

## 2023-08-03 NOTE — TELEPHONE ENCOUNTER
Pt dropped off a list of labs from nephrology. States he is trying to find out if he can get this doen at [facility]. Per [facility]. Yes, can have labs drawn Monday-Thursday due to it needing to be refrigerated. Pt notified and voiced understanding.

## 2023-08-03 NOTE — TELEPHONE ENCOUNTER
Called up to the front and let them know he was already scheduled  patient was notified.    Prior Authorization Approval    Authorization Effective Date: 8/24/2020  Authorization Expiration Date: 8/24/2021  Medication: Gleevec PA sent   Approved Dose/Quantity: 30/30ds  Reference #: [id number]   Insurance Company: [employer] - Phone [phone] Fax [phone]  Expected CoPay: $0     CoPay Card Available: No    Foundation Assistance Needed:    Which Pharmacy is filling the prescription (Not needed for infusion/clinic administered): [facility] - [place] [address]  Pharmacy Notified: Yes  Patient Notified: Yes    [facility] Prior Authorization Team   Phone: [phone]  Fax: [phone]

## 2023-08-15 ENCOUNTER — TELEPHONE (OUTPATIENT)
Dept: FAMILY MEDICINE | Facility: CLINIC | Age: 88
End: 2023-08-15

## 2023-08-15 DIAGNOSIS — Z79.899 HIGH RISK MEDICATION USE: Primary | ICD-10-CM

## 2023-08-15 DIAGNOSIS — E78.5 HYPERLIPIDEMIA, UNSPECIFIED HYPERLIPIDEMIA TYPE: ICD-10-CM

## 2023-08-15 DIAGNOSIS — N18.4 CHRONIC KIDNEY DISEASE (CKD), STAGE IV (SEVERE): ICD-10-CM

## 2023-08-15 DIAGNOSIS — R79.89 ELEVATED TSH: ICD-10-CM

## 2023-08-16 LAB
ALBUMIN SERPL-MCNC: 4.2 G/DL (ref 3.6–5.1)
ALBUMIN/GLOB SERPL: 1.1 (CALC) (ref 1–2.5)
ALP SERPL-CCNC: 80 U/L (ref 35–144)
ALT SERPL-CCNC: 8 U/L (ref 9–46)
AST SERPL-CCNC: 12 U/L (ref 10–35)
BILIRUB SERPL-MCNC: 0.5 MG/DL (ref 0.2–1.2)
BUN SERPL-MCNC: 79 MG/DL (ref 7–25)
BUN/CREAT SERPL: 26 (CALC) (ref 6–22)
CALCIUM SERPL-MCNC: 9.3 MG/DL (ref 8.6–10.3)
CHLORIDE SERPL-SCNC: 106 MMOL/L (ref 98–110)
CO2 SERPL-SCNC: 25 MMOL/L (ref 20–32)
CREAT SERPL-MCNC: 3.02 MG/DL (ref 0.7–1.22)
EGFR: 19 ML/MIN/1.73M2
GLOBULIN SER CALC-MCNC: 3.9 G/DL (CALC) (ref 1.9–3.7)
GLUCOSE SERPL-MCNC: 97 MG/DL (ref 65–99)
POTASSIUM SERPL-SCNC: 5.5 MMOL/L (ref 3.5–5.3)
PROT SERPL-MCNC: 8.1 G/DL (ref 6.1–8.1)
SODIUM SERPL-SCNC: 142 MMOL/L (ref 135–146)
TSH SERPL-ACNC: 4.21 MIU/L (ref 0.4–4.5)

## 2023-09-14 ENCOUNTER — OFFICE VISIT (OUTPATIENT)
Dept: FAMILY MEDICINE | Facility: CLINIC | Age: 88
End: 2023-09-14
Payer: MEDICARE

## 2023-09-14 VITALS
HEART RATE: 71 BPM | WEIGHT: 242 LBS | BODY MASS INDEX: 35.84 KG/M2 | OXYGEN SATURATION: 97 % | SYSTOLIC BLOOD PRESSURE: 112 MMHG | HEIGHT: 69 IN | DIASTOLIC BLOOD PRESSURE: 68 MMHG

## 2023-09-14 DIAGNOSIS — E11.65 UNCONTROLLED TYPE 2 DIABETES MELLITUS WITH HYPERGLYCEMIA: ICD-10-CM

## 2023-09-14 DIAGNOSIS — E11.69 TYPE 2 DIABETES MELLITUS WITH OTHER SPECIFIED COMPLICATION, WITH LONG-TERM CURRENT USE OF INSULIN: Primary | ICD-10-CM

## 2023-09-14 DIAGNOSIS — Z79.4 TYPE 2 DIABETES MELLITUS WITH OTHER SPECIFIED COMPLICATION, WITH LONG-TERM CURRENT USE OF INSULIN: Primary | ICD-10-CM

## 2023-09-14 DIAGNOSIS — I10 HYPERTENSION, UNSPECIFIED TYPE: ICD-10-CM

## 2023-09-14 DIAGNOSIS — M19.90 OSTEOARTHRITIS, UNSPECIFIED OSTEOARTHRITIS TYPE, UNSPECIFIED SITE: ICD-10-CM

## 2023-09-14 DIAGNOSIS — E66.01 SEVERE OBESITY (BMI 35.0-39.9) WITH COMORBIDITY: ICD-10-CM

## 2023-09-14 DIAGNOSIS — I25.10 CORONARY ARTERY DISEASE, UNSPECIFIED VESSEL OR LESION TYPE, UNSPECIFIED WHETHER ANGINA PRESENT, UNSPECIFIED WHETHER NATIVE OR TRANSPLANTED HEART: ICD-10-CM

## 2023-09-14 DIAGNOSIS — I48.0 PAROXYSMAL ATRIAL FIBRILLATION: ICD-10-CM

## 2023-09-14 DIAGNOSIS — Z23 NEED FOR INFLUENZA VACCINATION: ICD-10-CM

## 2023-09-14 DIAGNOSIS — N18.4 CHRONIC KIDNEY DISEASE (CKD), STAGE IV (SEVERE): ICD-10-CM

## 2023-09-14 LAB — HBA1C MFR BLD: 8.8 %

## 2023-09-14 PROCEDURE — G0008 FLU VACCINE - QUADRIVALENT - ADJUVANTED: ICD-10-PCS | Mod: S$GLB,,, | Performed by: NURSE PRACTITIONER

## 2023-09-14 PROCEDURE — 90694 VACC AIIV4 NO PRSRV 0.5ML IM: CPT | Mod: S$GLB,,, | Performed by: NURSE PRACTITIONER

## 2023-09-14 PROCEDURE — 83036 HEMOGLOBIN GLYCOSYLATED A1C: CPT | Mod: QW,,, | Performed by: NURSE PRACTITIONER

## 2023-09-14 PROCEDURE — 99214 PR OFFICE/OUTPT VISIT, EST, LEVL IV, 30-39 MIN: ICD-10-PCS | Mod: S$GLB,,, | Performed by: NURSE PRACTITIONER

## 2023-09-14 PROCEDURE — 99214 OFFICE O/P EST MOD 30 MIN: CPT | Mod: S$GLB,,, | Performed by: NURSE PRACTITIONER

## 2023-09-14 PROCEDURE — 83036 POCT HEMOGLOBIN A1C: ICD-10-PCS | Mod: QW,,, | Performed by: NURSE PRACTITIONER

## 2023-09-14 PROCEDURE — 90694 FLU VACCINE - QUADRIVALENT - ADJUVANTED: ICD-10-PCS | Mod: S$GLB,,, | Performed by: NURSE PRACTITIONER

## 2023-09-14 PROCEDURE — G0008 ADMIN INFLUENZA VIRUS VAC: HCPCS | Mod: S$GLB,,, | Performed by: NURSE PRACTITIONER

## 2023-09-14 RX ORDER — INSULIN GLARGINE 300 U/ML
30 INJECTION, SOLUTION SUBCUTANEOUS DAILY
Qty: 3 PEN | Refills: 4 | Status: SHIPPED | OUTPATIENT
Start: 2023-09-14 | End: 2024-03-13

## 2023-09-18 ENCOUNTER — TELEPHONE (OUTPATIENT)
Dept: FAMILY MEDICINE | Facility: CLINIC | Age: 88
End: 2023-09-18

## 2023-09-18 NOTE — TELEPHONE ENCOUNTER
----- Message from Tiffanie Reardon sent at 9/18/2023  9:02 AM CDT -----  Pt came in drop off a copy of his lab orders that his kidney doctor ordered. Pt stated that Leana wanted to take a look at the orders.    [phone]

## 2023-10-02 NOTE — PROGRESS NOTES
SUBJECTIVE:    Patient ID: Alex Hatch is a 88 y.o. male.    Chief Complaint: Follow-up (No bottles, A1c done 8/3 No complaints//BA )    88-year-old male presents for check up.  Treated for htn, hyperlipidemia, atrial fibrillation, cad, ckd, dm2 and bph. He is being closely followed for diabetes. Today a1c is 8.8mg/dl . He is not watching diet. Not taking full dose of insulin all the time. Sees dr. Lux regularly. Inr is monitored at his office. Using cane for ambulation.  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       Office Visit on 09/14/2023   Component Date Value Ref Range Status    Hemoglobin A1C, POC 09/14/2023 8.8  % Final   Telephone on 08/15/2023   Component Date Value Ref Range Status    Glucose 08/15/2023 97  65 - 99 mg/dL Final    BUN 08/15/2023 79 (H)  7 - 25 mg/dL Final    Creatinine 08/15/2023 3.02 (H)  0.70 - 1.22 mg/dL Final    eGFR 08/15/2023 19 (L)  > OR = 60 mL/min/1.73m2 Final    BUN/Creatinine Ratio 08/15/2023 26 (H)  6 - 22 (calc) Final    Sodium 08/15/2023 142  135 - 146 mmol/L Final    Potassium 08/15/2023 5.5 (H)  3.5 - 5.3 mmol/L Final    Chloride 08/15/2023 106  98 - 110 mmol/L Final    CO2 08/15/2023 25  20 - 32 mmol/L Final    Calcium 08/15/2023 9.3  8.6 - 10.3 mg/dL Final    Total Protein 08/15/2023 8.1  6.1 - 8.1 g/dL Final    Albumin 08/15/2023 4.2  3.6 - 5.1 g/dL Final    Globulin, Total 08/15/2023 3.9 (H)  1.9 - 3.7 g/dL (calc) Final    Albumin/Globulin Ratio 08/15/2023 1.1  1.0 - 2.5 (calc) Final    Total Bilirubin 08/15/2023 0.5  0.2 - 1.2 mg/dL Final    Alkaline Phosphatase 08/15/2023 80  35 - 144 U/L Final    AST 08/15/2023 12  10 - 35 U/L Final    ALT 08/15/2023 8 (L)  9 - 46 U/L Final    TSH w/reflex to FT4 08/15/2023 4.21  0.40 - 4.50 mIU/L Final   Office Visit on 08/03/2023   Component Date Value Ref Range Status    Hemoglobin A1C, POC 08/03/2023 8.0  %  Final   Office Visit on 08/02/2023   Component Date Value Ref Range Status    POC Residual Urine Volume 08/02/2023 0  0 - 100 mL Final   Office Visit on 07/06/2023   Component Date Value Ref Range Status    CULTURE, AEROBIC AND ANAEROBIC 07/06/2023    Final   Office Visit on 06/22/2023   Component Date Value Ref Range Status    Glucose 06/22/2023 193 (H)  65 - 99 mg/dL Final    BUN 06/22/2023 41 (H)  7 - 25 mg/dL Final    Creatinine 06/22/2023 2.68 (H)  0.70 - 1.22 mg/dL Final    eGFR 06/22/2023 23 (L)  > OR = 60 mL/min/1.73m2 Final    BUN/Creatinine Ratio 06/22/2023 15  6 - 22 (calc) Final    Sodium 06/22/2023 139  135 - 146 mmol/L Final    Potassium 06/22/2023 4.3  3.5 - 5.3 mmol/L Final    Chloride 06/22/2023 102  98 - 110 mmol/L Final    CO2 06/22/2023 26  20 - 32 mmol/L Final    Calcium 06/22/2023 9.0  8.6 - 10.3 mg/dL Final    Total Protein 06/22/2023 8.4 (H)  6.1 - 8.1 g/dL Final    Albumin 06/22/2023 4.0  3.6 - 5.1 g/dL Final    Globulin, Total 06/22/2023 4.4 (H)  1.9 - 3.7 g/dL (calc) Final    Albumin/Globulin Ratio 06/22/2023 0.9 (L)  1.0 - 2.5 (calc) Final    Total Bilirubin 06/22/2023 0.5  0.2 - 1.2 mg/dL Final    Alkaline Phosphatase 06/22/2023 86  35 - 144 U/L Final    AST 06/22/2023 11  10 - 35 U/L Final    ALT 06/22/2023 6 (L)  9 - 46 U/L Final    Color, UA 06/22/2023 YELLOW  YELLOW Final    Appearance, UA 06/22/2023 CLOUDY (A)  CLEAR Final    Specific [place], UA 06/22/2023 1.010  1.001 - 1.035 Final    pH, UA 06/22/2023 5.5  5.0 - 8.0 Final    Glucose, UA 06/22/2023 2+ (A)  NEGATIVE Final    Bilirubin, UA 06/22/2023 NEGATIVE  NEGATIVE Final    Ketones, UA 06/22/2023 NEGATIVE  NEGATIVE Final    Occult Blood UA 06/22/2023 1+ (A)  NEGATIVE Final    Protein, UA 06/22/2023 1+ (A)  NEGATIVE Final    Nitrite, UA 06/22/2023 NEGATIVE  NEGATIVE Final    Leukocytes, UA 06/22/2023 3+ (A)  NEGATIVE Final    Hemoglobin A1C, POC 06/22/2023 8.5  % Final   Office Visit on 05/11/2023    Component Date Value Ref Range Status    INR 05/11/2023 4.1 (A)  2.0 - 3.0 Final     Acceptable 05/11/2023 Yes   Final    Protime 05/11/2023 48.6 (A)  2 - 3 Final    Hemoglobin A1C, POC 05/11/2023 9.0  % Final   Office Visit on 04/13/2023   Component Date Value Ref Range Status    Hemoglobin A1C, POC 04/13/2023 9.9  % Final       Past Medical History:   Diagnosis Date    Atrial fibrillation     Dr Lux    CKD (chronic kidney disease), stage III     Coronary artery disease     Diabetes mellitus, type 2     Hyperlipidemia     Hypertension      Social History     Socioeconomic History    Marital status:    Tobacco Use    Smoking status: Never    Smokeless tobacco: Never   Substance and Sexual Activity    Alcohol use: No    Drug use: No     Past Surgical History:   Procedure Laterality Date    CARDIAC PACEMAKER PLACEMENT  5/2013    Dr Lux    COLONOSCOPY W/ POLYPECTOMY  2011    Dr Mar, repeat in 3 years    CORONARY ARTERY BYPASS GRAFT  1993    CYSTOSCOPY N/A 9/29/2021    Procedure: CYSTOSCOPY;  Surgeon: Ramona Skaggs Jr., MD;  Location: [facility] OR;  Service: Urology;  Laterality: N/A;    cassandra      TRANSRECTAL ULTRASOUND EXAMINATION N/A 9/29/2021    Procedure: ULTRASOUND, RECTAL APPROACH;  Surgeon: Ramona Skaggs Jr., MD;  Location: [facility] OR;  Service: Urology;  Laterality: N/A;    TRANSURETHRAL RESECTION OF PROSTATE N/A 10/26/2021    Procedure: TURP (TRANSURETHRAL RESECTION OF PROSTATE);  Surgeon: Ramona Skaggs Jr., MD;  Location: [facility] OR;  Service: Urology;  Laterality: N/A;     History reviewed. No pertinent family history.    Tests to Keep You Healthy    Eye Exam: ORDERED BUT NOT SCHEDULED      Review of patient's allergies indicates:   Allergen Reactions    Macrobid [nitrofurantoin monohyd/m-cryst]     Iodinated contrast media      Betadine ok    Sitagliptin phosphate      Other reaction(s): Other (See Comments)       Current Outpatient Medications:      "acetaminophen-codeine 300-60mg (TYLENOL #4) 300-60 mg Tab, TAKE 1 TABLET BY MOUTH EVERY 12 HOURS AS NEEDED FOR KNEE PAIN, Disp: , Rfl:     amlodipine-benazepril 5-20 mg (LOTREL) 5-20 mg per capsule, , Disp: , Rfl:     augmented betamethasone dipropionate (DIPROLENE-AF) 0.05 % cream, Apply to thickened areas twice daily x 4 wks, tk 1 wk off then repeat until skin returns to normal texture, Disp: 50 g, Rfl: 2    benazepriL (LOTENSIN) 10 MG tablet, Take 10 mg by mouth., Disp: , Rfl:     blood sugar diagnostic Strp, To check BG 3 times daily, to use with insurance preferred meter, Disp: 100 each, Rfl: 0    blood-glucose meter kit, To check BG 3 times daily, to use with insurance preferred meter, Disp: 1 each, Rfl: 0    ciclopirox 1 % shampoo, Wash scalp let sit 3 minutes then rinse every other night, Disp: 120 mL, Rfl: 11    clobetasoL (TEMOVATE) 0.05 % external solution, APPLY TWICE A DAY TO HEAD WHEN ITCHING, Disp: , Rfl:     furosemide (LASIX) 40 MG tablet, Take 1 tablet (40 mg total) by mouth daily as needed., Disp: 30 tablet, Rfl: 3    lancets Misc, To check BG 3 times daily, to use with insurance preferred meter, Disp: 100 each, Rfl: 0    lovastatin (MEVACOR) 20 MG tablet, Take 1 tablet (20 mg total) by mouth every evening., Disp: 90 tablet, Rfl: 1    mupirocin (BACTROBAN) 2 % ointment, Apply topically 2 (two) times daily., Disp: 22 g, Rfl: 11    pen needle, diabetic 32 gauge x 5/32" Ndle, 1 each by Misc.(Non-Drug; Combo Route) route once daily at 6am., Disp: 100 each, Rfl: 3    polyethylene glycol (GLYCOLAX) 17 gram PwPk, Take 17 g by mouth once daily., Disp: 100 each, Rfl: 2    SSD 1 % cream, APPLY 1 APPLICATION TOPICALLY TWICE A DAY, Disp: , Rfl:     torsemide (DEMADEX) 20 MG Tab, Take 1 tablet (20 mg total) by mouth once daily., Disp: 90 tablet, Rfl: 1    triamcinolone acetonide 0.1% (KENALOG) 0.1 % cream, APPLY 1 APPLICATION ON THE SKIN TWICE A DAY AS NEEDED DO NOT APPLY TO FACE, AXILLA OR " "GROIN, Disp: , Rfl:     warfarin (COUMADIN) 5 MG tablet, Take 2.5 mg by mouth once daily., Disp: , Rfl:     insulin glargine, TOUJEO, (TOUJEO SOLOSTAR U-300 INSULIN) 300 unit/mL (1.5 mL) InPn pen, Inject 30 Units into the skin once daily., Disp: 3 pen , Rfl: 4    linaCLOtide (LINZESS) 145 mcg Cap capsule, Take 1 capsule (145 mcg total) by mouth before breakfast. (Patient not taking: Reported on 9/14/2023), Disp: 90 capsule, Rfl: 1    zolpidem (AMBIEN) 10 mg Tab, Take 1 tablet (10 mg total) by mouth nightly as needed (sleep). (Patient not taking: Reported on 9/14/2023), Disp: 90 tablet, Rfl: 0    Review of Systems   Constitutional:  Negative for fatigue, fever and unexpected weight change.   HENT:  Negative for ear pain, sinus pressure and sore throat.    Eyes:  Negative for pain.   Respiratory:  Negative for cough and shortness of breath.    Cardiovascular:  Negative for chest pain and leg swelling.   Gastrointestinal:  Negative for abdominal pain, constipation, nausea and vomiting.   Genitourinary:  Negative for dysuria, frequency and urgency.   Musculoskeletal:  Negative for arthralgias.   Skin:  Negative for rash.   Neurological:  Negative for dizziness, weakness and headaches.   Psychiatric/Behavioral:  Negative for sleep disturbance.           Objective:      Vitals:    09/14/23 1028   BP: 112/68   Pulse: 71   SpO2: 97%   Weight: 109.8 kg (242 lb)   Height: 5' 9" (1.753 m)     Physical Exam  Vitals and nursing note reviewed.   Constitutional:       General: He is not in acute distress.     Appearance: Normal appearance. He is well-developed. He is obese.      Comments: Cane    HENT:      Head: Normocephalic and atraumatic.      Right Ear: External ear normal.      Left Ear: External ear normal.   Eyes:      Pupils: Pupils are equal, round, and reactive to light.   Neck:      Trachea: No tracheal deviation.   Cardiovascular:      Rate and Rhythm: Normal rate and regular rhythm.      Heart sounds: Murmur " heard.      No friction rub. No gallop.   Pulmonary:      Breath sounds: Normal breath sounds. No stridor. No wheezing or rales.   Abdominal:      Palpations: Abdomen is soft. There is no mass.      Tenderness: There is no abdominal tenderness.   Musculoskeletal:         General: No tenderness or deformity.      Cervical back: Neck supple.   Feet:      Right foot:      Protective Sensation: 5 sites tested.  2 sites sensed.      Left foot:      Protective Sensation: 5 sites tested.  2 sites sensed.   Lymphadenopathy:      Cervical: No cervical adenopathy.   Skin:     General: Skin is warm and dry.   Neurological:      Mental Status: He is alert and oriented to person, place, and time.      Coordination: Coordination normal.   Psychiatric:         Thought Content: Thought content normal.         Assessment:       1. Type 2 diabetes mellitus with other specified complication, with long-term current use of insulin    2. Need for influenza vaccination    3. Uncontrolled type 2 diabetes mellitus with hyperglycemia    4. Severe obesity (BMI 35.0-39.9) with comorbidity    5. Chronic kidney disease (CKD), stage IV (severe)    6. Hypertension, unspecified type    7. Coronary artery disease, unspecified vessel or lesion type, unspecified whether angina present, unspecified whether native or transplanted heart    8. Paroxysmal atrial fibrillation    9. Osteoarthritis, unspecified osteoarthritis type, unspecified site         Plan:       Type 2 diabetes mellitus with other specified complication, with long-term current use of insulin  Comments:  toujeo nightly  Orders:  -     Hemoglobin A1C, POCT    Need for influenza vaccination  -     Influenza - Quadrivalent (Adjuvanted)    Uncontrolled type 2 diabetes mellitus with hyperglycemia  Comments:  increase toujeo to 24 units  Orders:  -     insulin glargine, TOUJEO, (TOUJEO SOLOSTAR U-300 INSULIN) 300 unit/mL (1.5 mL) InPn pen; Inject 30 Units into the skin once daily.  Dispense: 3  pen ; Refill: 4    Severe obesity (BMI 35.0-39.9) with comorbidity    Chronic kidney disease (CKD), stage IV (severe)  Comments:  see nephrology as planned    Hypertension, unspecified type  Comments:  bp is well controlled    Coronary artery disease, unspecified vessel or lesion type, unspecified whether angina present, unspecified whether native or transplanted heart    Paroxysmal atrial fibrillation    Osteoarthritis, unspecified osteoarthritis type, unspecified site      Follow up in about 6 weeks (around 10/26/2023), or if symptoms worsen or fail to improve, for medication management.        10/2/2023 Leana Perez

## 2023-10-02 NOTE — PROGRESS NOTES
SUBJECTIVE:    Patient ID: Alex Hatch is a 88 y.o. male.    Chief Complaint: Follow-up (6 week f/u//no med bottles//would like rx for tylenol 4 for his knee//last eye exam scheduled 8/30/23//tc)    88-year-old male presents for check up.  Treated for htn, hyperlipidemia, atrial fibrillation, cad, ckd, dm2 and bph. He is following up for uncontrolled diabetes. Today a1c is down to 8.0mg/dl he reports trying to watch diet.  He still drives locally. Eats out for lunch several times per week. Sees dr. Lux regularly. Inr is monitored at his office. Using cane for ambulation.  sees dr arrieta for Onychomycosis every 6 months.   Followed by dr. Skaggs for prostate cancer. Had turp 10/21     F        Office Visit on 08/03/2023   Component Date Value Ref Range Status    Hemoglobin A1C, POC 08/03/2023 8.0  % Final   Office Visit on 08/02/2023   Component Date Value Ref Range Status    POC Residual Urine Volume 08/02/2023 0  0 - 100 mL Final   Office Visit on 07/06/2023   Component Date Value Ref Range Status    CULTURE, AEROBIC AND ANAEROBIC 07/06/2023    Final   Office Visit on 06/22/2023   Component Date Value Ref Range Status    Glucose 06/22/2023 193 (H)  65 - 99 mg/dL Final    BUN 06/22/2023 41 (H)  7 - 25 mg/dL Final    Creatinine 06/22/2023 2.68 (H)  0.70 - 1.22 mg/dL Final    eGFR 06/22/2023 23 (L)  > OR = 60 mL/min/1.73m2 Final    BUN/Creatinine Ratio 06/22/2023 15  6 - 22 (calc) Final    Sodium 06/22/2023 139  135 - 146 mmol/L Final    Potassium 06/22/2023 4.3  3.5 - 5.3 mmol/L Final    Chloride 06/22/2023 102  98 - 110 mmol/L Final    CO2 06/22/2023 26  20 - 32 mmol/L Final    Calcium 06/22/2023 9.0  8.6 - 10.3 mg/dL Final    Total Protein 06/22/2023 8.4 (H)  6.1 - 8.1 g/dL Final    Albumin 06/22/2023 4.0  3.6 - 5.1 g/dL Final    Globulin, Total 06/22/2023 4.4 (H)  1.9 - 3.7 g/dL (calc) Final    Albumin/Globulin Ratio 06/22/2023 0.9 (L)  1.0 - 2.5 (calc) Final    Total Bilirubin 06/22/2023 0.5  0.2 - 1.2  mg/dL Final    Alkaline Phosphatase 06/22/2023 86  35 - 144 U/L Final    AST 06/22/2023 11  10 - 35 U/L Final    ALT 06/22/2023 6 (L)  9 - 46 U/L Final    Color, UA 06/22/2023 YELLOW  YELLOW Final    Appearance, UA 06/22/2023 CLOUDY (A)  CLEAR Final    Specific Gravity, UA 06/22/2023 1.010  1.001 - 1.035 Final    pH, UA 06/22/2023 5.5  5.0 - 8.0 Final    Glucose, UA 06/22/2023 2+ (A)  NEGATIVE Final    Bilirubin, UA 06/22/2023 NEGATIVE  NEGATIVE Final    Ketones, UA 06/22/2023 NEGATIVE  NEGATIVE Final    Occult Blood UA 06/22/2023 1+ (A)  NEGATIVE Final    Protein, UA 06/22/2023 1+ (A)  NEGATIVE Final    Nitrite, UA 06/22/2023 NEGATIVE  NEGATIVE Final    Leukocytes, UA 06/22/2023 3+ (A)  NEGATIVE Final    Hemoglobin A1C, POC 06/22/2023 8.5  % Final   Office Visit on 05/11/2023   Component Date Value Ref Range Status    INR 05/11/2023 4.1 (A)  2.0 - 3.0 Final     Acceptable 05/11/2023 Yes   Final    Protime 05/11/2023 48.6 (A)  2 - 3 Final    Hemoglobin A1C, POC 05/11/2023 9.0  % Final   Office Visit on 04/13/2023   Component Date Value Ref Range Status    Hemoglobin A1C, POC 04/13/2023 9.9  % Final       Past Medical History:   Diagnosis Date    Atrial fibrillation     Dr Lux    CKD (chronic kidney disease), stage III     Coronary artery disease     Diabetes mellitus, type 2     Hyperlipidemia     Hypertension      Social History     Socioeconomic History    Marital status:    Tobacco Use    Smoking status: Never    Smokeless tobacco: Never   Substance and Sexual Activity    Alcohol use: No    Drug use: No     Past Surgical History:   Procedure Laterality Date    CARDIAC PACEMAKER PLACEMENT  5/2013    Dr Lux    COLONOSCOPY W/ POLYPECTOMY  2011    Dr Mar, repeat in 3 years    CORONARY ARTERY BYPASS GRAFT  1993    CYSTOSCOPY N/A 9/29/2021    Procedure: CYSTOSCOPY;  Surgeon: Ramona Skaggs Jr., MD;  Location: [facility] OR;  Service: Urology;  Laterality: N/A;    cassandra      TRANSRECTAL  ULTRASOUND EXAMINATION N/A 9/29/2021    Procedure: ULTRASOUND, RECTAL APPROACH;  Surgeon: Ramona Skaggs Jr., MD;  Location: [facility] OR;  Service: Urology;  Laterality: N/A;    TRANSURETHRAL RESECTION OF PROSTATE N/A 10/26/2021    Procedure: TURP (TRANSURETHRAL RESECTION OF PROSTATE);  Surgeon: Ramona Skaggs Jr., MD;  Location: [facility] OR;  Service: Urology;  Laterality: N/A;     History reviewed. No pertinent family history.    Tests to Keep You Healthy    Eye Exam: ORDERED BUT NOT SCHEDULED      Review of patient's allergies indicates:   Allergen Reactions    Macrobid [nitrofurantoin monohyd/m-cryst]     Iodinated contrast media      Betadine ok    Sitagliptin phosphate      Other reaction(s): Other (See Comments)       Current Outpatient Medications:     acetaminophen-codeine 300-60mg (TYLENOL #4) 300-60 mg Tab, TAKE 1 TABLET BY MOUTH EVERY 12 HOURS AS NEEDED FOR KNEE PAIN, Disp: , Rfl:     amlodipine-benazepril 5-20 mg (LOTREL) 5-20 mg per capsule, , Disp: , Rfl:     augmented betamethasone dipropionate (DIPROLENE-AF) 0.05 % cream, Apply to thickened areas twice daily x 4 wks, tk 1 wk off then repeat until skin returns to normal texture, Disp: 50 g, Rfl: 2    benazepriL (LOTENSIN) 10 MG tablet, Take 10 mg by mouth., Disp: , Rfl:     blood sugar diagnostic Strp, To check BG 3 times daily, to use with insurance preferred meter, Disp: 100 each, Rfl: 0    blood-glucose meter kit, To check BG 3 times daily, to use with insurance preferred meter, Disp: 1 each, Rfl: 0    ciclopirox 1 % shampoo, Wash scalp let sit 3 minutes then rinse every other night, Disp: 120 mL, Rfl: 11    clobetasoL (TEMOVATE) 0.05 % external solution, APPLY TWICE A DAY TO HEAD WHEN ITCHING, Disp: , Rfl:     furosemide (LASIX) 40 MG tablet, Take 1 tablet (40 mg total) by mouth daily as needed., Disp: 30 tablet, Rfl: 3    insulin glargine, TOUJEO, (TOUJEO SOLOSTAR U-300 INSULIN) 300 unit/mL (1.5 mL) InPn pen, Inject 30 Units into the skin once daily.,  "Disp: 3 pen , Rfl: 4    lancets Misc, To check BG 3 times daily, to use with insurance preferred meter, Disp: 100 each, Rfl: 0    linaCLOtide (LINZESS) 145 mcg Cap capsule, Take 1 capsule (145 mcg total) by mouth before breakfast. (Patient not taking: Reported on 9/14/2023), Disp: 90 capsule, Rfl: 1    lovastatin (MEVACOR) 20 MG tablet, Take 1 tablet (20 mg total) by mouth every evening., Disp: 90 tablet, Rfl: 1    mupirocin (BACTROBAN) 2 % ointment, Apply topically 2 (two) times daily., Disp: 22 g, Rfl: 11    pen needle, diabetic 32 gauge x 5/32" Ndle, 1 each by Misc.(Non-Drug; Combo Route) route once daily at 6am., Disp: 100 each, Rfl: 3    polyethylene glycol (GLYCOLAX) 17 gram PwPk, Take 17 g by mouth once daily., Disp: 100 each, Rfl: 2    SSD 1 % cream, APPLY 1 APPLICATION TOPICALLY TWICE A DAY, Disp: , Rfl:     torsemide (DEMADEX) 20 MG Tab, Take 1 tablet (20 mg total) by mouth once daily., Disp: 90 tablet, Rfl: 1    triamcinolone acetonide 0.1% (KENALOG) 0.1 % cream, APPLY 1 APPLICATION ON THE SKIN TWICE A DAY AS NEEDED DO NOT APPLY TO FACE, AXILLA OR GROIN, Disp: , Rfl:     warfarin (COUMADIN) 5 MG tablet, Take 2.5 mg by mouth once daily., Disp: , Rfl:     zolpidem (AMBIEN) 10 mg Tab, Take 1 tablet (10 mg total) by mouth nightly as needed (sleep). (Patient not taking: Reported on 9/14/2023), Disp: 90 tablet, Rfl: 0    Review of Systems   Constitutional:  Negative for fatigue, fever and unexpected weight change.   HENT:  Negative for ear pain, sinus pressure and sore throat.    Eyes:  Negative for pain.   Respiratory:  Negative for cough and shortness of breath.    Cardiovascular:  Negative for chest pain and leg swelling.   Gastrointestinal:  Negative for abdominal pain, constipation, nausea and vomiting.   Genitourinary:  Negative for dysuria, frequency and urgency.   Musculoskeletal:  Negative for arthralgias.   Skin:  Negative for rash.   Neurological:  Negative for dizziness, weakness and headaches. " "  Psychiatric/Behavioral:  Negative for sleep disturbance.           Objective:      Vitals:    08/03/23 1018   BP: 120/78   Pulse: 77   Weight: 109.8 kg (242 lb)   Height: 5' 11" (1.803 m)     Physical Exam  Vitals and nursing note reviewed.   Constitutional:       General: He is not in acute distress.     Appearance: Normal appearance. He is well-developed. He is obese.   Eyes:      Pupils: Pupils are equal, round, and reactive to light.   Neck:      Trachea: No tracheal deviation.   Cardiovascular:      Rate and Rhythm: Normal rate. Rhythm irregular.      Heart sounds: No murmur heard.     No friction rub. No gallop.   Pulmonary:      Breath sounds: Normal breath sounds. No stridor. No wheezing or rales.   Abdominal:      Palpations: Abdomen is soft. There is no mass.      Tenderness: There is no abdominal tenderness.   Musculoskeletal:         General: No tenderness or deformity.      Cervical back: Neck supple.   Lymphadenopathy:      Cervical: No cervical adenopathy.   Skin:     General: Skin is warm and dry.   Neurological:      Mental Status: He is alert and oriented to person, place, and time.      Coordination: Coordination normal.   Psychiatric:         Thought Content: Thought content normal.           Assessment:       1. Type 2 diabetes mellitus with other specified complication, with long-term current use of insulin    2. Hypertension, unspecified type    3. Coronary artery disease, unspecified vessel or lesion type, unspecified whether angina present, unspecified whether native or transplanted heart    4. Paroxysmal atrial fibrillation    5. Hyperlipidemia, unspecified hyperlipidemia type    6. Chronic kidney disease (CKD), stage IV (severe)         Plan:       Type 2 diabetes mellitus with other specified complication, with long-term current use of insulin  -     Hemoglobin A1C, POCT    Hypertension, unspecified type  Comments:  bp well controlled    Coronary artery disease, unspecified vessel or " lesion type, unspecified whether angina present, unspecified whether native or transplanted heart    Paroxysmal atrial fibrillation  Comments:  coumadin    Hyperlipidemia, unspecified hyperlipidemia type  Comments:  lovastatin    Chronic kidney disease (CKD), stage IV (severe)      Follow up in about 6 weeks (around 9/14/2023) for Diabetic Check-Up.        10/2/2023 Leana Perez

## 2023-11-20 DIAGNOSIS — G47.00 INSOMNIA, UNSPECIFIED TYPE: ICD-10-CM

## 2023-11-20 DIAGNOSIS — R60.9 EDEMA, UNSPECIFIED TYPE: ICD-10-CM

## 2023-11-20 RX ORDER — FUROSEMIDE 40 MG/1
40 TABLET ORAL DAILY PRN
Qty: 30 TABLET | Refills: 3 | Status: SHIPPED | OUTPATIENT
Start: 2023-11-20 | End: 2024-11-19

## 2023-11-20 RX ORDER — ZOLPIDEM TARTRATE 10 MG/1
10 TABLET ORAL NIGHTLY PRN
Qty: 90 TABLET | Refills: 0 | Status: SHIPPED | OUTPATIENT
Start: 2023-11-20

## 2023-11-20 NOTE — TELEPHONE ENCOUNTER
----- Message from Kaity Cowart sent at 11/20/2023 11:18 AM CST -----  Refill for furosemide 40 mg, Zolpidem 10 mg. [facility] on [address]. Pt #[phone]

## 2023-11-29 ENCOUNTER — TELEPHONE (OUTPATIENT)
Dept: FAMILY MEDICINE | Facility: CLINIC | Age: 88
End: 2023-11-29

## 2023-11-29 DIAGNOSIS — Z79.4 TYPE 2 DIABETES MELLITUS WITH OTHER SPECIFIED COMPLICATION, WITH LONG-TERM CURRENT USE OF INSULIN: ICD-10-CM

## 2023-11-29 DIAGNOSIS — I10 HYPERTENSION, UNSPECIFIED TYPE: ICD-10-CM

## 2023-11-29 DIAGNOSIS — Z79.899 HIGH RISK MEDICATION USE: Primary | ICD-10-CM

## 2023-11-29 DIAGNOSIS — E11.69 TYPE 2 DIABETES MELLITUS WITH OTHER SPECIFIED COMPLICATION, WITH LONG-TERM CURRENT USE OF INSULIN: ICD-10-CM

## 2023-11-29 DIAGNOSIS — N18.4 CHRONIC KIDNEY DISEASE (CKD), STAGE IV (SEVERE): ICD-10-CM

## 2023-12-14 ENCOUNTER — TELEPHONE (OUTPATIENT)
Dept: FAMILY MEDICINE | Facility: CLINIC | Age: 88
End: 2023-12-14

## 2023-12-14 ENCOUNTER — OFFICE VISIT (OUTPATIENT)
Dept: FAMILY MEDICINE | Facility: CLINIC | Age: 88
End: 2023-12-14
Payer: MEDICARE

## 2023-12-14 VITALS
HEIGHT: 69 IN | HEART RATE: 70 BPM | SYSTOLIC BLOOD PRESSURE: 110 MMHG | DIASTOLIC BLOOD PRESSURE: 72 MMHG | WEIGHT: 245 LBS | OXYGEN SATURATION: 98 % | BODY MASS INDEX: 36.29 KG/M2

## 2023-12-14 DIAGNOSIS — N18.30 STAGE 3 CHRONIC KIDNEY DISEASE, UNSPECIFIED WHETHER STAGE 3A OR 3B CKD: ICD-10-CM

## 2023-12-14 DIAGNOSIS — L73.9 FOLLICULITIS: ICD-10-CM

## 2023-12-14 DIAGNOSIS — I10 HYPERTENSION, UNSPECIFIED TYPE: ICD-10-CM

## 2023-12-14 DIAGNOSIS — E11.69 TYPE 2 DIABETES MELLITUS WITH OTHER SPECIFIED COMPLICATION, WITH LONG-TERM CURRENT USE OF INSULIN: Primary | ICD-10-CM

## 2023-12-14 DIAGNOSIS — G47.00 INSOMNIA, UNSPECIFIED TYPE: ICD-10-CM

## 2023-12-14 DIAGNOSIS — Z79.899 HIGH RISK MEDICATION USE: ICD-10-CM

## 2023-12-14 DIAGNOSIS — E78.5 HYPERLIPIDEMIA, UNSPECIFIED HYPERLIPIDEMIA TYPE: ICD-10-CM

## 2023-12-14 DIAGNOSIS — I48.0 PAROXYSMAL ATRIAL FIBRILLATION: ICD-10-CM

## 2023-12-14 DIAGNOSIS — I25.10 CORONARY ARTERY DISEASE INVOLVING NATIVE CORONARY ARTERY OF NATIVE HEART WITHOUT ANGINA PECTORIS: ICD-10-CM

## 2023-12-14 DIAGNOSIS — Z79.4 TYPE 2 DIABETES MELLITUS WITH OTHER SPECIFIED COMPLICATION, WITH LONG-TERM CURRENT USE OF INSULIN: Primary | ICD-10-CM

## 2023-12-14 LAB — HBA1C MFR BLD: 8 %

## 2023-12-14 PROCEDURE — 83036 HEMOGLOBIN GLYCOSYLATED A1C: CPT | Mod: QW,,, | Performed by: NURSE PRACTITIONER

## 2023-12-14 PROCEDURE — 99214 PR OFFICE/OUTPT VISIT, EST, LEVL IV, 30-39 MIN: ICD-10-PCS | Mod: S$GLB,,, | Performed by: NURSE PRACTITIONER

## 2023-12-14 PROCEDURE — 99214 OFFICE O/P EST MOD 30 MIN: CPT | Mod: S$GLB,,, | Performed by: NURSE PRACTITIONER

## 2023-12-14 PROCEDURE — 83036 POCT HEMOGLOBIN A1C: ICD-10-PCS | Mod: QW,,, | Performed by: NURSE PRACTITIONER

## 2023-12-14 RX ORDER — CEPHALEXIN 500 MG/1
500 CAPSULE ORAL EVERY 8 HOURS
Qty: 24 CAPSULE | Refills: 0 | Status: SHIPPED | OUTPATIENT
Start: 2023-12-14 | End: 2024-03-13

## 2023-12-14 RX ORDER — HYDROCORTISONE 25 MG/G
CREAM TOPICAL 2 TIMES DAILY
Qty: 45 G | Refills: 0 | Status: SHIPPED | OUTPATIENT
Start: 2023-12-14

## 2023-12-14 NOTE — TELEPHONE ENCOUNTER
----- Message from Claudia Chung sent at 12/14/2023  2:55 PM CST -----  The patient saw Leana this morning. He said he did not need the Hydrocortisone 2.5 % cream. He does need it. Can you call it in to [facility] on [address] Pt's # [phone]

## 2023-12-14 NOTE — TELEPHONE ENCOUNTER
----- Message from Tiffanie Reardon sent at 12/14/2023 10:30 AM CST -----  Pt needs to schedule a 6 week f/u.   [phone]

## 2023-12-14 NOTE — PROGRESS NOTES
SUBJECTIVE:    Patient ID: Alex Hatch is a 88 y.o. male.    Chief Complaint: Follow-up (Pt is here for an follow up. Pt had eye exam this year w/ eye care 2020.)    88-year-old male presents for check up.  Treated for htn, hyperlipidemia, atrial fibrillation, cad, ckd, dm2 and bph. He is being closely followed for diabetes. Today a1c is 8.0mg/dl . He is taking insulin as prescribed. Sees dr. Lux regularly. Inr is monitored at his office. Using cane for ambulation.  dr. 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   Was seen by dr. Morrison.         Office Visit on 12/14/2023   Component Date Value Ref Range Status    Hemoglobin A1C, POC 12/14/2023 8.0  % Final   Office Visit on 09/14/2023   Component Date Value Ref Range Status    Hemoglobin A1C, POC 09/14/2023 8.8  % Final   Telephone on 08/15/2023   Component Date Value Ref Range Status    Glucose 08/15/2023 97  65 - 99 mg/dL Final    BUN 08/15/2023 79 (H)  7 - 25 mg/dL Final    Creatinine 08/15/2023 3.02 (H)  0.70 - 1.22 mg/dL Final    eGFR 08/15/2023 19 (L)  > OR = 60 mL/min/1.73m2 Final    BUN/Creatinine Ratio 08/15/2023 26 (H)  6 - 22 (calc) Final    Sodium 08/15/2023 142  135 - 146 mmol/L Final    Potassium 08/15/2023 5.5 (H)  3.5 - 5.3 mmol/L Final    Chloride 08/15/2023 106  98 - 110 mmol/L Final    CO2 08/15/2023 25  20 - 32 mmol/L Final    Calcium 08/15/2023 9.3  8.6 - 10.3 mg/dL Final    Total Protein 08/15/2023 8.1  6.1 - 8.1 g/dL Final    Albumin 08/15/2023 4.2  3.6 - 5.1 g/dL Final    Globulin, Total 08/15/2023 3.9 (H)  1.9 - 3.7 g/dL (calc) Final    Albumin/Globulin Ratio 08/15/2023 1.1  1.0 - 2.5 (calc) Final    Total Bilirubin 08/15/2023 0.5  0.2 - 1.2 mg/dL Final    Alkaline Phosphatase 08/15/2023 80  35 - 144 U/L Final    AST 08/15/2023 12  10 - 35 U/L Final    ALT 08/15/2023 8 (L)  9 - 46 U/L Final    TSH w/reflex to FT4 08/15/2023 4.21  0.40  - 4.50 mIU/L Final   Office Visit on 08/03/2023   Component Date Value Ref Range Status    Hemoglobin A1C, POC 08/03/2023 8.0  % Final   Office Visit on 08/02/2023   Component Date Value Ref Range Status    POC Residual Urine Volume 08/02/2023 0  0 - 100 mL Final   Office Visit on 07/06/2023   Component Date Value Ref Range Status    CULTURE, AEROBIC AND ANAEROBIC 07/06/2023    Final   Office Visit on 06/22/2023   Component Date Value Ref Range Status    Glucose 06/22/2023 193 (H)  65 - 99 mg/dL Final    BUN 06/22/2023 41 (H)  7 - 25 mg/dL Final    Creatinine 06/22/2023 2.68 (H)  0.70 - 1.22 mg/dL Final    eGFR 06/22/2023 23 (L)  > OR = 60 mL/min/1.73m2 Final    BUN/Creatinine Ratio 06/22/2023 15  6 - 22 (calc) Final    Sodium 06/22/2023 139  135 - 146 mmol/L Final    Potassium 06/22/2023 4.3  3.5 - 5.3 mmol/L Final    Chloride 06/22/2023 102  98 - 110 mmol/L Final    CO2 06/22/2023 26  20 - 32 mmol/L Final    Calcium 06/22/2023 9.0  8.6 - 10.3 mg/dL Final    Total Protein 06/22/2023 8.4 (H)  6.1 - 8.1 g/dL Final    Albumin 06/22/2023 4.0  3.6 - 5.1 g/dL Final    Globulin, Total 06/22/2023 4.4 (H)  1.9 - 3.7 g/dL (calc) Final    Albumin/Globulin Ratio 06/22/2023 0.9 (L)  1.0 - 2.5 (calc) Final    Total Bilirubin 06/22/2023 0.5  0.2 - 1.2 mg/dL Final    Alkaline Phosphatase 06/22/2023 86  35 - 144 U/L Final    AST 06/22/2023 11  10 - 35 U/L Final    ALT 06/22/2023 6 (L)  9 - 46 U/L Final    Color, UA 06/22/2023 YELLOW  YELLOW Final    Appearance, UA 06/22/2023 CLOUDY (A)  CLEAR Final    Specific Gravity, UA 06/22/2023 1.010  1.001 - 1.035 Final    pH, UA 06/22/2023 5.5  5.0 - 8.0 Final    Glucose, UA 06/22/2023 2+ (A)  NEGATIVE Final    Bilirubin, UA 06/22/2023 NEGATIVE  NEGATIVE Final    Ketones, UA 06/22/2023 NEGATIVE  NEGATIVE Final    Occult Blood UA 06/22/2023 1+ (A)  NEGATIVE Final    Protein, UA 06/22/2023 1+ (A)  NEGATIVE Final    Nitrite, UA 06/22/2023 NEGATIVE  NEGATIVE Final    Leukocytes, UA 06/22/2023 3+  (A)  NEGATIVE Final    Hemoglobin A1C, POC 06/22/2023 8.5  % Final       Past Medical History:   Diagnosis Date    Atrial fibrillation     Dr Lux    CKD (chronic kidney disease), stage III     Coronary artery disease     Diabetes mellitus, type 2     Hyperlipidemia     Hypertension      Social History     Socioeconomic History    Marital status:    Tobacco Use    Smoking status: Never    Smokeless tobacco: Never   Substance and Sexual Activity    Alcohol use: No    Drug use: No     Past Surgical History:   Procedure Laterality Date    CARDIAC PACEMAKER PLACEMENT  5/2013    Dr Lux    COLONOSCOPY W/ POLYPECTOMY  2011    Dr Mar, repeat in 3 years    CORONARY ARTERY BYPASS GRAFT  1993    CYSTOSCOPY N/A 9/29/2021    Procedure: CYSTOSCOPY;  Surgeon: Ramona Skaggs Jr., MD;  Location: [facility] OR;  Service: Urology;  Laterality: N/A;    cassandra      TRANSRECTAL ULTRASOUND EXAMINATION N/A 9/29/2021    Procedure: ULTRASOUND, RECTAL APPROACH;  Surgeon: Ramona Skaggs Jr., MD;  Location: [facility] OR;  Service: Urology;  Laterality: N/A;    TRANSURETHRAL RESECTION OF PROSTATE N/A 10/26/2021    Procedure: TURP (TRANSURETHRAL RESECTION OF PROSTATE);  Surgeon: Ramona Skaggs Jr., MD;  Location: [facility] OR;  Service: Urology;  Laterality: N/A;     History reviewed. No pertinent family history.    Tests to Keep You Healthy    Eye Exam: ORDERED BUT NOT SCHEDULED      Review of patient's allergies indicates:   Allergen Reactions    Macrobid [nitrofurantoin monohyd/m-cryst]     Iodinated contrast media      Betadine ok    Sitagliptin phosphate      Other reaction(s): Other (See Comments)       Current Outpatient Medications:     acetaminophen-codeine 300-60mg (TYLENOL #4) 300-60 mg Tab, TAKE 1 TABLET BY MOUTH EVERY 12 HOURS AS NEEDED FOR KNEE PAIN, Disp: , Rfl:     amlodipine-benazepril 5-20 mg (LOTREL) 5-20 mg per capsule, , Disp: , Rfl:     augmented betamethasone dipropionate (DIPROLENE-AF) 0.05 % cream, Apply to thickened areas  "twice daily x 4 wks, tk 1 wk off then repeat until skin returns to normal texture, Disp: 50 g, Rfl: 2    benazepriL (LOTENSIN) 10 MG tablet, Take 10 mg by mouth., Disp: , Rfl:     blood sugar diagnostic Strp, To check BG 3 times daily, to use with insurance preferred meter, Disp: 100 each, Rfl: 0    blood-glucose meter kit, To check BG 3 times daily, to use with insurance preferred meter, Disp: 1 each, Rfl: 0    cephALEXin (KEFLEX) 500 MG capsule, Take 1 capsule (500 mg total) by mouth every 8 (eight) hours., Disp: 24 capsule, Rfl: 0    ciclopirox 1 % shampoo, Wash scalp let sit 3 minutes then rinse every other night, Disp: 120 mL, Rfl: 11    clobetasoL (TEMOVATE) 0.05 % external solution, APPLY TWICE A DAY TO HEAD WHEN ITCHING, Disp: , Rfl:     furosemide (LASIX) 40 MG tablet, Take 1 tablet (40 mg total) by mouth daily as needed., Disp: 30 tablet, Rfl: 3    hydrocortisone 2.5 % cream, Apply topically 2 (two) times daily., Disp: 45 g, Rfl: 0    insulin glargine, TOUJEO, (TOUJEO SOLOSTAR U-300 INSULIN) 300 unit/mL (1.5 mL) InPn pen, Inject 30 Units into the skin once daily., Disp: 3 pen , Rfl: 4    lancets Misc, To check BG 3 times daily, to use with insurance preferred meter, Disp: 100 each, Rfl: 0    linaCLOtide (LINZESS) 145 mcg Cap capsule, Take 1 capsule (145 mcg total) by mouth before breakfast. (Patient not taking: Reported on 9/14/2023), Disp: 90 capsule, Rfl: 1    lovastatin (MEVACOR) 20 MG tablet, Take 1 tablet (20 mg total) by mouth every evening., Disp: 90 tablet, Rfl: 1    mupirocin (BACTROBAN) 2 % ointment, Apply topically 2 (two) times daily., Disp: 22 g, Rfl: 11    pen needle, diabetic 32 gauge x 5/32" Ndle, 1 each by Misc.(Non-Drug; Combo Route) route once daily at 6am., Disp: 100 each, Rfl: 3    polyethylene glycol (GLYCOLAX) 17 gram PwPk, Take 17 g by mouth once daily., Disp: 100 each, Rfl: 2    SSD 1 % cream, APPLY 1 APPLICATION TOPICALLY TWICE A DAY, Disp: , Rfl:     torsemide (DEMADEX) 20 MG " "Tab, Take 1 tablet (20 mg total) by mouth once daily., Disp: 90 tablet, Rfl: 1    triamcinolone acetonide 0.1% (KENALOG) 0.1 % cream, APPLY 1 APPLICATION ON THE SKIN TWICE A DAY AS NEEDED DO NOT APPLY TO FACE, AXILLA OR GROIN, Disp: , Rfl:     warfarin (COUMADIN) 5 MG tablet, Take 2.5 mg by mouth once daily., Disp: , Rfl:     zolpidem (AMBIEN) 10 mg Tab, Take 1 tablet (10 mg total) by mouth nightly as needed (sleep)., Disp: 90 tablet, Rfl: 0    Review of Systems   Constitutional:  Negative for fatigue, fever and unexpected weight change.   HENT:  Negative for ear pain, sinus pressure and sore throat.    Eyes:  Negative for pain.   Respiratory:  Negative for cough and shortness of breath.    Cardiovascular:  Negative for chest pain and leg swelling.   Gastrointestinal:  Negative for abdominal pain, constipation, nausea and vomiting.   Genitourinary:  Negative for dysuria, frequency and urgency.   Musculoskeletal:  Negative for arthralgias.   Skin:  Negative for rash.   Neurological:  Negative for dizziness, weakness and headaches.   Psychiatric/Behavioral:  Positive for sleep disturbance.           Objective:      Vitals:    12/14/23 0924   BP: 110/72   Pulse: 70   SpO2: 98%   Weight: 111.1 kg (245 lb)   Height: 5' 9" (1.753 m)     Physical Exam  Vitals and nursing note reviewed.   Constitutional:       General: He is not in acute distress.     Appearance: Normal appearance. He is well-developed. He is obese.   HENT:      Head: Normocephalic and atraumatic.   Neck:      Trachea: No tracheal deviation.   Cardiovascular:      Rate and Rhythm: Normal rate and regular rhythm.      Heart sounds: No murmur heard.     No friction rub. No gallop.   Pulmonary:      Breath sounds: Normal breath sounds. No stridor. No wheezing or rales.   Abdominal:      Palpations: Abdomen is soft. There is no mass.      Tenderness: There is no abdominal tenderness.   Musculoskeletal:         General: No tenderness or deformity.      Cervical " back: Neck supple.   Lymphadenopathy:      Cervical: No cervical adenopathy.   Skin:     General: Skin is warm and dry.      Comments: Folliculitis to scalp. Scaling to nape of neck   Neurological:      Mental Status: He is alert and oriented to person, place, and time.      Coordination: Coordination normal.      Comments: Slow cautious gait. Using cane   Psychiatric:         Thought Content: Thought content normal.           Assessment:       1. Type 2 diabetes mellitus with other specified complication, with long-term current use of insulin    2. Hypertension, unspecified type    3. Paroxysmal atrial fibrillation    4. Coronary artery disease involving native coronary artery of native heart without angina pectoris    5. Stage 3 chronic kidney disease, unspecified whether stage 3a or 3b CKD    6. Hyperlipidemia, unspecified hyperlipidemia type    7. High risk medication use    8. Insomnia, unspecified type    9. Folliculitis         Plan:       Type 2 diabetes mellitus with other specified complication, with long-term current use of insulin  Comments:  hga1c 8.0  Orders:  -     Hemoglobin A1C, POCT    Hypertension, unspecified type  Comments:  bp is well controlled    Paroxysmal atrial fibrillation  Comments:  managed by dr. salazar    Coronary artery disease involving native coronary artery of native heart without angina pectoris    Stage 3 chronic kidney disease, unspecified whether stage 3a or 3b CKD  Comments:  managed by dr. lynch    Hyperlipidemia, unspecified hyperlipidemia type  Comments:  lovastatin    High risk medication use    Insomnia, unspecified type  Comments:  ambien    Folliculitis  Comments:  keflex tid    Other orders  -     cephALEXin (KEFLEX) 500 MG capsule; Take 1 capsule (500 mg total) by mouth every 8 (eight) hours.  Dispense: 24 capsule; Refill: 0      Follow up in 6 weeks (on 1/25/2024), or if symptoms worsen or fail to improve, for medication management.        12/20/2023 Leana  Chris

## 2024-01-08 DIAGNOSIS — Z79.899 HIGH RISK MEDICATION USE: ICD-10-CM

## 2024-01-08 DIAGNOSIS — E78.5 HYPERLIPIDEMIA, UNSPECIFIED HYPERLIPIDEMIA TYPE: ICD-10-CM

## 2024-01-08 DIAGNOSIS — R60.9 EDEMA, UNSPECIFIED TYPE: ICD-10-CM

## 2024-01-08 RX ORDER — LOVASTATIN 20 MG/1
20 TABLET ORAL NIGHTLY
Qty: 90 TABLET | Refills: 1 | Status: SHIPPED | OUTPATIENT
Start: 2024-01-08

## 2024-01-08 NOTE — TELEPHONE ENCOUNTER
Confirmed pt needing refill on Lovastatin    Last OV 12/14/2023  Upcoming OV 1/25/2024    Rx order set up.

## 2024-01-25 ENCOUNTER — TELEPHONE (OUTPATIENT)
Dept: FAMILY MEDICINE | Facility: CLINIC | Age: 89
End: 2024-01-25

## 2024-01-25 NOTE — TELEPHONE ENCOUNTER
Spoke with patient to check on him after missing his appointment today. Patient states he is okay and had an intestinal problem. Patient has been scheduled next week with NA Perez and to call if anything else is needed.

## 2024-01-31 ENCOUNTER — TELEPHONE (OUTPATIENT)
Dept: FAMILY MEDICINE | Facility: CLINIC | Age: 89
End: 2024-01-31

## 2024-01-31 ENCOUNTER — OFFICE VISIT (OUTPATIENT)
Dept: FAMILY MEDICINE | Facility: CLINIC | Age: 89
End: 2024-01-31
Payer: MEDICARE

## 2024-01-31 VITALS
HEART RATE: 74 BPM | OXYGEN SATURATION: 100 % | BODY MASS INDEX: 40.32 KG/M2 | DIASTOLIC BLOOD PRESSURE: 68 MMHG | SYSTOLIC BLOOD PRESSURE: 112 MMHG | WEIGHT: 266 LBS | HEIGHT: 68 IN

## 2024-01-31 DIAGNOSIS — I25.10 CORONARY ARTERY DISEASE INVOLVING NATIVE CORONARY ARTERY OF NATIVE HEART WITHOUT ANGINA PECTORIS: ICD-10-CM

## 2024-01-31 DIAGNOSIS — E11.69 TYPE 2 DIABETES MELLITUS WITH OTHER SPECIFIED COMPLICATION, WITH LONG-TERM CURRENT USE OF INSULIN: Primary | ICD-10-CM

## 2024-01-31 DIAGNOSIS — R09.89 DECREASED PEDAL PULSES: ICD-10-CM

## 2024-01-31 DIAGNOSIS — Z79.4 TYPE 2 DIABETES MELLITUS WITH OTHER SPECIFIED COMPLICATION, WITH LONG-TERM CURRENT USE OF INSULIN: Primary | ICD-10-CM

## 2024-01-31 DIAGNOSIS — F32.1 MAJOR DEPRESSIVE DISORDER, SINGLE EPISODE, MODERATE: ICD-10-CM

## 2024-01-31 DIAGNOSIS — E66.01 SEVERE OBESITY (BMI 35.0-39.9) WITH COMORBIDITY: ICD-10-CM

## 2024-01-31 DIAGNOSIS — N18.30 STAGE 3 CHRONIC KIDNEY DISEASE, UNSPECIFIED WHETHER STAGE 3A OR 3B CKD: ICD-10-CM

## 2024-01-31 DIAGNOSIS — G47.00 INSOMNIA, UNSPECIFIED TYPE: ICD-10-CM

## 2024-01-31 DIAGNOSIS — I1A.0 RESISTANT HYPERTENSION: ICD-10-CM

## 2024-01-31 DIAGNOSIS — I48.21 PERMANENT ATRIAL FIBRILLATION: ICD-10-CM

## 2024-01-31 LAB — HBA1C MFR BLD: 7.8 %

## 2024-01-31 PROCEDURE — 83036 HEMOGLOBIN GLYCOSYLATED A1C: CPT | Mod: QW,,, | Performed by: NURSE PRACTITIONER

## 2024-01-31 PROCEDURE — 99214 OFFICE O/P EST MOD 30 MIN: CPT | Mod: S$GLB,,, | Performed by: NURSE PRACTITIONER

## 2024-01-31 RX ORDER — CLOTRIMAZOLE AND BETAMETHASONE DIPROPIONATE 10; .64 MG/G; MG/G
CREAM TOPICAL 2 TIMES DAILY
Qty: 45 G | Refills: 2 | Status: SHIPPED | OUTPATIENT
Start: 2024-01-31

## 2024-01-31 RX ORDER — ISOSORBIDE MONONITRATE 30 MG/1
1 TABLET, EXTENDED RELEASE ORAL DAILY
COMMUNITY
Start: 2023-12-19 | End: 2024-12-18

## 2024-01-31 RX ORDER — ERGOCALCIFEROL 1.25 MG/1
50000 CAPSULE ORAL
COMMUNITY
Start: 2024-01-22

## 2024-01-31 RX ORDER — CALCITRIOL 0.25 UG/1
0.25 CAPSULE ORAL
COMMUNITY
Start: 2024-01-12

## 2024-01-31 RX ORDER — GABAPENTIN 300 MG/1
300 CAPSULE ORAL NIGHTLY
Qty: 30 CAPSULE | Refills: 1 | Status: SHIPPED | OUTPATIENT
Start: 2024-01-31 | End: 2025-01-30

## 2024-01-31 RX ORDER — NITROGLYCERIN 0.4 MG/1
0.4 TABLET SUBLINGUAL
COMMUNITY
Start: 2023-12-19 | End: 2024-12-18

## 2024-01-31 NOTE — TELEPHONE ENCOUNTER
----- Message from María Meade sent at 1/31/2024  2:01 PM CST -----  - 1:22- pt would like to talk to racheal   [phone]

## 2024-01-31 NOTE — TELEPHONE ENCOUNTER
----- Message from Tiffanie Reardon sent at 1/31/2024 11:02 AM CST -----  Pt needs to schedule a 6 week f/u.  [phone]

## 2024-01-31 NOTE — TELEPHONE ENCOUNTER
Spoke to pt and let him know his 6 week appointment       Pt stated he on his after visit summary under diagnosis it says he has prostate cancer. Pt stated he does not have prostate cancer and he wants to know why that's added and if it can be removed

## 2024-01-31 NOTE — PROGRESS NOTES
SUBJECTIVE:    Patient ID: Alex Hatch is a 88 y.o. male.    Chief Complaint: Follow-up (No bottles//Pt here fo r6 mo follow up//JL)    88-year-old male presents for check up.  Treated for htn, hyperlipidemia, atrial fibrillation, cad, ckd, dm2 and bph. He is being closely followed for diabetes. Today a1c is 7.8mg/dl . He is taking insulin as prescribed. This continues to improve.Sees dr. Lux regularly. Inr is monitored at his office. Using cane for ambulation.  dr. 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 Morrison. Has bilateral leg pain at night. Not sure if wants to try medication        Office Visit on 01/31/2024   Component Date Value Ref Range Status    Hemoglobin A1C, POC 01/31/2024 7.8  % Final   Office Visit on 12/14/2023   Component Date Value Ref Range Status    Hemoglobin A1C, POC 12/14/2023 8.0  % Final   Office Visit on 09/14/2023   Component Date Value Ref Range Status    Hemoglobin A1C, POC 09/14/2023 8.8  % Final   Telephone on 08/15/2023   Component Date Value Ref Range Status    Glucose 08/15/2023 97  65 - 99 mg/dL Final    BUN 08/15/2023 79 (H)  7 - 25 mg/dL Final    Creatinine 08/15/2023 3.02 (H)  0.70 - 1.22 mg/dL Final    eGFR 08/15/2023 19 (L)  > OR = 60 mL/min/1.73m2 Final    BUN/Creatinine Ratio 08/15/2023 26 (H)  6 - 22 (calc) Final    Sodium 08/15/2023 142  135 - 146 mmol/L Final    Potassium 08/15/2023 5.5 (H)  3.5 - 5.3 mmol/L Final    Chloride 08/15/2023 106  98 - 110 mmol/L Final    CO2 08/15/2023 25  20 - 32 mmol/L Final    Calcium 08/15/2023 9.3  8.6 - 10.3 mg/dL Final    Total Protein 08/15/2023 8.1  6.1 - 8.1 g/dL Final    Albumin 08/15/2023 4.2  3.6 - 5.1 g/dL Final    Globulin, Total 08/15/2023 3.9 (H)  1.9 - 3.7 g/dL (calc) Final    Albumin/Globulin Ratio 08/15/2023 1.1  1.0 - 2.5 (calc) Final    Total Bilirubin 08/15/2023 0.5  0.2 - 1.2 mg/dL Final    Alkaline Phosphatase 08/15/2023 80  35 - 144 U/L Final     AST 08/15/2023 12  10 - 35 U/L Final    ALT 08/15/2023 8 (L)  9 - 46 U/L Final    TSH w/reflex to FT4 08/15/2023 4.21  0.40 - 4.50 mIU/L Final   Office Visit on 08/03/2023   Component Date Value Ref Range Status    Hemoglobin A1C, POC 08/03/2023 8.0  % Final   Office Visit on 08/02/2023   Component Date Value Ref Range Status    POC Residual Urine Volume 08/02/2023 0  0 - 100 mL Final       Past Medical History:   Diagnosis Date    Atrial fibrillation     Dr Lux    CKD (chronic kidney disease), stage III     Coronary artery disease     Diabetes mellitus, type 2     Hyperlipidemia     Hypertension      Social History     Socioeconomic History    Marital status:    Tobacco Use    Smoking status: Never    Smokeless tobacco: Never   Substance and Sexual Activity    Alcohol use: No    Drug use: No     Past Surgical History:   Procedure Laterality Date    CARDIAC PACEMAKER PLACEMENT  5/2013    Dr Lux    COLONOSCOPY W/ POLYPECTOMY  2011    Dr Mar, repeat in 3 years    CORONARY ARTERY BYPASS GRAFT  1993    CYSTOSCOPY N/A 9/29/2021    Procedure: CYSTOSCOPY;  Surgeon: Ramona Skaggs Jr., MD;  Location: [facility] OR;  Service: Urology;  Laterality: N/A;    cassandra      TRANSRECTAL ULTRASOUND EXAMINATION N/A 9/29/2021    Procedure: ULTRASOUND, RECTAL APPROACH;  Surgeon: Ramona Skaggs Jr., MD;  Location: [facility] OR;  Service: Urology;  Laterality: N/A;    TRANSURETHRAL RESECTION OF PROSTATE N/A 10/26/2021    Procedure: TURP (TRANSURETHRAL RESECTION OF PROSTATE);  Surgeon: Ramona Skaggs Jr., MD;  Location: [facility] OR;  Service: Urology;  Laterality: N/A;     History reviewed. No pertinent family history.    Tests to Keep You Healthy    Eye Exam: ORDERED BUT NOT SCHEDULED      Review of patient's allergies indicates:   Allergen Reactions    Macrobid [nitrofurantoin monohyd/m-cryst]     Iodinated contrast media      Betadine ok    Sitagliptin phosphate      Other reaction(s): Other (See Comments)       Current Outpatient  Medications:     calcitRIOL (ROCALTROL) 0.25 MCG Cap, Take 0.25 mcg by mouth every 7 days., Disp: , Rfl:     ergocalciferol (ERGOCALCIFEROL) 50,000 unit Cap, Take 50,000 Units by mouth every 7 days., Disp: , Rfl:     isosorbide mononitrate (IMDUR) 30 MG 24 hr tablet, Take 1 tablet by mouth once daily., Disp: , Rfl:     nitroGLYCERIN (NITROSTAT) 0.4 MG SL tablet, Place 0.4 mg under the tongue., Disp: , Rfl:     acetaminophen-codeine 300-60mg (TYLENOL #4) 300-60 mg Tab, TAKE 1 TABLET BY MOUTH EVERY 12 HOURS AS NEEDED FOR KNEE PAIN, Disp: , Rfl:     amlodipine-benazepril 5-20 mg (LOTREL) 5-20 mg per capsule, , Disp: , Rfl:     augmented betamethasone dipropionate (DIPROLENE-AF) 0.05 % cream, Apply to thickened areas twice daily x 4 wks, tk 1 wk off then repeat until skin returns to normal texture, Disp: 50 g, Rfl: 2    benazepriL (LOTENSIN) 10 MG tablet, Take 10 mg by mouth., Disp: , Rfl:     blood sugar diagnostic Strp, To check BG 3 times daily, to use with insurance preferred meter, Disp: 100 each, Rfl: 0    blood-glucose meter kit, To check BG 3 times daily, to use with insurance preferred meter, Disp: 1 each, Rfl: 0    cephALEXin (KEFLEX) 500 MG capsule, Take 1 capsule (500 mg total) by mouth every 8 (eight) hours., Disp: 24 capsule, Rfl: 0    ciclopirox 1 % shampoo, Wash scalp let sit 3 minutes then rinse every other night, Disp: 120 mL, Rfl: 11    clobetasoL (TEMOVATE) 0.05 % external solution, APPLY TWICE A DAY TO HEAD WHEN ITCHING, Disp: , Rfl:     clotrimazole-betamethasone 1-0.05% (LOTRISONE) cream, Apply topically 2 (two) times daily., Disp: 45 g, Rfl: 2    furosemide (LASIX) 40 MG tablet, Take 1 tablet (40 mg total) by mouth daily as needed., Disp: 30 tablet, Rfl: 3    gabapentin (NEURONTIN) 300 MG capsule, Take 1 capsule (300 mg total) by mouth every evening., Disp: 30 capsule, Rfl: 1    hydrocortisone 2.5 % cream, Apply topically 2 (two) times daily., Disp: 45 g, Rfl: 0    insulin glargine, TOUJEO,  "(TOUJEO SOLOSTAR U-300 INSULIN) 300 unit/mL (1.5 mL) InPn pen, Inject 30 Units into the skin once daily., Disp: 3 pen , Rfl: 4    lancets Misc, To check BG 3 times daily, to use with insurance preferred meter, Disp: 100 each, Rfl: 0    lovastatin (MEVACOR) 20 MG tablet, Take 1 tablet (20 mg total) by mouth every evening., Disp: 90 tablet, Rfl: 1    mupirocin (BACTROBAN) 2 % ointment, Apply topically 2 (two) times daily., Disp: 22 g, Rfl: 11    pen needle, diabetic 32 gauge x 5/32" Ndle, 1 each by Misc.(Non-Drug; Combo Route) route once daily at 6am., Disp: 100 each, Rfl: 3    polyethylene glycol (GLYCOLAX) 17 gram PwPk, Take 17 g by mouth once daily., Disp: 100 each, Rfl: 2    SSD 1 % cream, APPLY 1 APPLICATION TOPICALLY TWICE A DAY, Disp: , Rfl:     torsemide (DEMADEX) 20 MG Tab, Take 1 tablet (20 mg total) by mouth once daily., Disp: 90 tablet, Rfl: 1    triamcinolone acetonide 0.1% (KENALOG) 0.1 % cream, APPLY 1 APPLICATION ON THE SKIN TWICE A DAY AS NEEDED DO NOT APPLY TO FACE, AXILLA OR GROIN, Disp: , Rfl:     warfarin (COUMADIN) 5 MG tablet, Take 2.5 mg by mouth once daily., Disp: , Rfl:     zolpidem (AMBIEN) 10 mg Tab, Take 1 tablet (10 mg total) by mouth nightly as needed (sleep)., Disp: 90 tablet, Rfl: 0    Review of Systems       Objective:      Vitals:    01/31/24 1008   BP: 112/68   Pulse: 74   SpO2: 100%   Weight: 120.7 kg (266 lb)   Height: 5' 7.5" (1.715 m)     Physical Exam  Vitals and nursing note reviewed.   Constitutional:       General: He is not in acute distress.     Appearance: Normal appearance. He is well-developed. He is obese. He is not ill-appearing.      Comments: cane   HENT:      Head: Normocephalic and atraumatic.      Right Ear: External ear normal.      Left Ear: External ear normal.   Eyes:      Pupils: Pupils are equal, round, and reactive to light.   Neck:      Trachea: No tracheal deviation.   Cardiovascular:      Rate and Rhythm: Normal rate and regular rhythm.      Pulses:    "        Dorsalis pedis pulses are 1+ on the right side and 1+ on the left side.      Heart sounds: No murmur heard.     No friction rub. No gallop.   Pulmonary:      Breath sounds: Normal breath sounds. No stridor. No wheezing or rales.   Abdominal:      Palpations: Abdomen is soft. There is no mass.      Tenderness: There is no abdominal tenderness.   Musculoskeletal:         General: No tenderness or deformity.      Cervical back: Neck supple.   Feet:      Right foot:      Protective Sensation: 5 sites tested.  3 sites sensed.      Left foot:      Protective Sensation: 5 sites tested.  3 sites sensed.   Lymphadenopathy:      Cervical: No cervical adenopathy.   Skin:     General: Skin is warm and dry.   Neurological:      Mental Status: He is alert and oriented to person, place, and time.      Coordination: Coordination normal.   Psychiatric:         Thought Content: Thought content normal.           Assessment:       1. Type 2 diabetes mellitus with other specified complication, with long-term current use of insulin    2. Major depressive disorder, single episode, moderate    3. Severe obesity (BMI 35.0-39.9) with comorbidity    4. Decreased pedal pulses    5. Resistant hypertension    6. Permanent atrial fibrillation    7. Coronary artery disease involving native coronary artery of native heart without angina pectoris    8. Stage 3 chronic kidney disease, unspecified whether stage 3a or 3b CKD    9. Insomnia, unspecified type         Plan:       Type 2 diabetes mellitus with other specified complication, with long-term current use of insulin  Comments:  hga1c 7.8  Orders:  -     POCT HEMOGLOBIN A1C    Major depressive disorder, single episode, moderate    Severe obesity (BMI 35.0-39.9) with comorbidity    Decreased pedal pulses  Comments:  ultrasound  Orders:  -     CV Ultrasound doppler arterial legs bilat; Future    Resistant hypertension  Comments:  bp is well controlled    Permanent atrial  fibrillation    Coronary artery disease involving native coronary artery of native heart without angina pectoris  Comments:  followed by dr. salazar    Stage 3 chronic kidney disease, unspecified whether stage 3a or 3b CKD  Comments:  dr. lynch    Insomnia, unspecified type  Comments:  ambien    Other orders  -     clotrimazole-betamethasone 1-0.05% (LOTRISONE) cream; Apply topically 2 (two) times daily.  Dispense: 45 g; Refill: 2  -     gabapentin (NEURONTIN) 300 MG capsule; Take 1 capsule (300 mg total) by mouth every evening.  Dispense: 30 capsule; Refill: 1      Follow up in about 6 weeks (around 3/13/2024) for medication management.        2/13/2024 Leana Perez

## 2024-02-01 NOTE — TELEPHONE ENCOUNTER
It was in his history and was added when I checked chart review. I removed it completely from chart. It was put in by another office.

## 2024-02-05 ENCOUNTER — TELEPHONE (OUTPATIENT)
Dept: FAMILY MEDICINE | Facility: CLINIC | Age: 89
End: 2024-02-05
Payer: MEDICARE

## 2024-02-05 DIAGNOSIS — I25.10 CORONARY ARTERY DISEASE INVOLVING NATIVE CORONARY ARTERY OF NATIVE HEART WITHOUT ANGINA PECTORIS: ICD-10-CM

## 2024-02-05 DIAGNOSIS — R09.89 DECREASED PEDAL PULSES: Primary | ICD-10-CM

## 2024-02-05 NOTE — TELEPHONE ENCOUNTER
----- Message -----   From: Kathy Lynn, Patient Care Assistant   Sent: 2/2/2024   1:50 PM CST   To: Leana Perez Staff     Good afternoon, the mentioned patient has an order for CV Ultrasound doppler arterial legs bilat . Anything with CV in front is not performed at [facility], [facility] or [facility]. Please Advise.   Thank you

## 2024-03-08 LAB
LEFT EYE DM RETINOPATHY: POSITIVE
RIGHT EYE DM RETINOPATHY: POSITIVE

## 2024-03-13 ENCOUNTER — OFFICE VISIT (OUTPATIENT)
Dept: FAMILY MEDICINE | Facility: CLINIC | Age: 89
End: 2024-03-13
Payer: MEDICARE

## 2024-03-13 ENCOUNTER — TELEPHONE (OUTPATIENT)
Dept: FAMILY MEDICINE | Facility: CLINIC | Age: 89
End: 2024-03-13

## 2024-03-13 VITALS
HEART RATE: 77 BPM | HEIGHT: 67 IN | OXYGEN SATURATION: 99 % | SYSTOLIC BLOOD PRESSURE: 112 MMHG | BODY MASS INDEX: 37.83 KG/M2 | WEIGHT: 241 LBS | DIASTOLIC BLOOD PRESSURE: 68 MMHG

## 2024-03-13 DIAGNOSIS — I48.21 PERMANENT ATRIAL FIBRILLATION: ICD-10-CM

## 2024-03-13 DIAGNOSIS — L73.9 FOLLICULITIS: ICD-10-CM

## 2024-03-13 DIAGNOSIS — Z79.899 HIGH RISK MEDICATION USE: ICD-10-CM

## 2024-03-13 DIAGNOSIS — E11.65 UNCONTROLLED TYPE 2 DIABETES MELLITUS WITH HYPERGLYCEMIA: Primary | ICD-10-CM

## 2024-03-13 DIAGNOSIS — E78.00 PURE HYPERCHOLESTEROLEMIA: ICD-10-CM

## 2024-03-13 DIAGNOSIS — N18.4 CHRONIC KIDNEY DISEASE (CKD), STAGE IV (SEVERE): ICD-10-CM

## 2024-03-13 LAB — HBA1C MFR BLD: 8 %

## 2024-03-13 PROCEDURE — 99214 OFFICE O/P EST MOD 30 MIN: CPT | Mod: S$GLB,,, | Performed by: NURSE PRACTITIONER

## 2024-03-13 PROCEDURE — 83036 HEMOGLOBIN GLYCOSYLATED A1C: CPT | Mod: QW,,, | Performed by: NURSE PRACTITIONER

## 2024-03-13 RX ORDER — FLUTICASONE PROPIONATE 50 MCG
1 SPRAY, SUSPENSION (ML) NASAL
COMMUNITY
Start: 2024-03-12 | End: 2025-03-12

## 2024-03-13 RX ORDER — INSULIN GLARGINE 300 [IU]/ML
40 INJECTION, SOLUTION SUBCUTANEOUS DAILY
Qty: 3 PEN | Refills: 4
Start: 2024-03-13 | End: 2025-03-13

## 2024-03-13 NOTE — TELEPHONE ENCOUNTER
----- Message from Leana Perez NP sent at 3/13/2024 10:49 AM CDT -----  Need to request eye exam from 2020

## 2024-03-13 NOTE — LETTER
[address]  [place] [address]  Phone: [phone]   [phone]                        MD Barbara Hernandez MD Chequita Williams, MD Matthew Bassett, PA-C Linda Melerine, NA Perez, NA Gomez, NA      Date: 03/13/2024        Patient: Alex Hatch  YOB: 1935    To whom it may concern:    Please fax over the above patient's most recent eye exam notes.    Sincerely,     Lolis Howard LPN

## 2024-03-14 RX ORDER — DOXYCYCLINE 100 MG/1
100 CAPSULE ORAL 2 TIMES DAILY
Qty: 14 CAPSULE | Refills: 0 | Status: SHIPPED | OUTPATIENT
Start: 2024-03-14

## 2024-03-14 NOTE — PROGRESS NOTES
SUBJECTIVE:    Patient ID: Alex Hatch is a 88 y.o. male.    Chief Complaint: Follow-up (No bottles//Pt here for 6 week follow up//discuss ATBs//[facility])    88-year-old male presents for check up.  Treated for htn, hyperlipidemia, atrial fibrillation, cad, ckd, dm2 and bph. He is being closely followed for diabetes. Today a1c is 8.0mg/dl . He is taking insulin 34units of insulin. Does not watch diet much. Sees dr. Lux regularly. Inr is monitored at his office. Using cane for ambulation.  Recently treated with keflex for uti. Patient reports severe diarrhea so discontinued with four pill left. The medication did help chronic folliculitis in scalp. Woul dlike to discuss.     Follow-up  Associated symptoms include a rash. Pertinent negatives include no abdominal pain, arthralgias, chest pain, coughing, fatigue, fever, headaches, nausea, vomiting or weakness.       Office Visit on 03/13/2024   Component Date Value Ref Range Status    Hemoglobin A1C, POC 03/13/2024 8.0  % Final   Office Visit on 01/31/2024   Component Date Value Ref Range Status    Hemoglobin A1C, POC 01/31/2024 7.8  % Final   Office Visit on 12/14/2023   Component Date Value Ref Range Status    Hemoglobin A1C, POC 12/14/2023 8.0  % Final   Office Visit on 09/14/2023   Component Date Value Ref Range Status    Hemoglobin A1C, POC 09/14/2023 8.8  % Final       Past Medical History:   Diagnosis Date    Atrial fibrillation     Dr Lux    CKD (chronic kidney disease), stage III     Coronary artery disease     Diabetes mellitus, type 2     Hyperlipidemia     Hypertension      Social History     Socioeconomic History    Marital status:    Tobacco Use    Smoking status: Never    Smokeless tobacco: Never   Substance and Sexual Activity    Alcohol use: No    Drug use: No     Past Surgical History:   Procedure Laterality Date    CARDIAC PACEMAKER PLACEMENT  5/2013    Dr Lux    COLONOSCOPY W/  POLYPECTOMY  2011    Dr Mar, repeat in 3 years    CORONARY ARTERY BYPASS GRAFT  1993    CYSTOSCOPY N/A 9/29/2021    Procedure: CYSTOSCOPY;  Surgeon: Ramona Skaggs Jr., MD;  Location: [facility] OR;  Service: Urology;  Laterality: N/A;    cassandra      TRANSRECTAL ULTRASOUND EXAMINATION N/A 9/29/2021    Procedure: ULTRASOUND, RECTAL APPROACH;  Surgeon: Ramona Skaggs Jr., MD;  Location: [facility] OR;  Service: Urology;  Laterality: N/A;    TRANSURETHRAL RESECTION OF PROSTATE N/A 10/26/2021    Procedure: TURP (TRANSURETHRAL RESECTION OF PROSTATE);  Surgeon: Ramona Skaggs Jr., MD;  Location: [facility] OR;  Service: Urology;  Laterality: N/A;     History reviewed. No pertinent family history.    Tests to Keep You Healthy    Eye Exam: ORDERED BUT NOT SCHEDULED      Review of patient's allergies indicates:   Allergen Reactions    Macrobid [nitrofurantoin monohyd/m-cryst]     Iodinated contrast media      Betadine ok    Keflex [cephalexin]      diarrhea    Sitagliptin phosphate      Other reaction(s): Other (See Comments)       Current Outpatient Medications:     fluticasone propionate (FLONASE) 50 mcg/actuation nasal spray, 1 spray by Nasal route., Disp: , Rfl:     acetaminophen-codeine 300-60mg (TYLENOL #4) 300-60 mg Tab, TAKE 1 TABLET BY MOUTH EVERY 12 HOURS AS NEEDED FOR KNEE PAIN, Disp: , Rfl:     amlodipine-benazepril 5-20 mg (LOTREL) 5-20 mg per capsule, , Disp: , Rfl:     augmented betamethasone dipropionate (DIPROLENE-AF) 0.05 % cream, Apply to thickened areas twice daily x 4 wks, tk 1 wk off then repeat until skin returns to normal texture, Disp: 50 g, Rfl: 2    benazepriL (LOTENSIN) 10 MG tablet, Take 10 mg by mouth., Disp: , Rfl:     blood sugar diagnostic Strp, To check BG 3 times daily, to use with insurance preferred meter, Disp: 100 each, Rfl: 0    blood-glucose meter kit, To check BG 3 times daily, to use with insurance preferred meter, Disp: 1 each, Rfl: 0    calcitRIOL (ROCALTROL) 0.25 MCG Cap, Take 0.25 mcg by mouth  "every 7 days., Disp: , Rfl:     ciclopirox 1 % shampoo, Wash scalp let sit 3 minutes then rinse every other night, Disp: 120 mL, Rfl: 11    clobetasoL (TEMOVATE) 0.05 % external solution, APPLY TWICE A DAY TO HEAD WHEN ITCHING, Disp: , Rfl:     clotrimazole-betamethasone 1-0.05% (LOTRISONE) cream, Apply topically 2 (two) times daily., Disp: 45 g, Rfl: 2    doxycycline (MONODOX) 100 MG capsule, Take 1 capsule (100 mg total) by mouth 2 (two) times daily., Disp: 14 capsule, Rfl: 0    ergocalciferol (ERGOCALCIFEROL) 50,000 unit Cap, Take 50,000 Units by mouth every 7 days., Disp: , Rfl:     furosemide (LASIX) 40 MG tablet, Take 1 tablet (40 mg total) by mouth daily as needed., Disp: 30 tablet, Rfl: 3    gabapentin (NEURONTIN) 300 MG capsule, Take 1 capsule (300 mg total) by mouth every evening., Disp: 30 capsule, Rfl: 1    hydrocortisone 2.5 % cream, Apply topically 2 (two) times daily., Disp: 45 g, Rfl: 0    insulin glargine, TOUJEO, (TOUJEO SOLOSTAR U-300 INSULIN) 300 unit/mL (1.5 mL) InPn pen, Inject 40 Units into the skin once daily., Disp: 3 Pen, Rfl: 4    isosorbide mononitrate (IMDUR) 30 MG 24 hr tablet, Take 1 tablet by mouth once daily., Disp: , Rfl:     lancets Misc, To check BG 3 times daily, to use with insurance preferred meter, Disp: 100 each, Rfl: 0    lovastatin (MEVACOR) 20 MG tablet, Take 1 tablet (20 mg total) by mouth every evening., Disp: 90 tablet, Rfl: 1    mupirocin (BACTROBAN) 2 % ointment, Apply topically 2 (two) times daily., Disp: 22 g, Rfl: 11    nitroGLYCERIN (NITROSTAT) 0.4 MG SL tablet, Place 0.4 mg under the tongue., Disp: , Rfl:     pen needle, diabetic 32 gauge x 5/32" Ndle, 1 each by Misc.(Non-Drug; Combo Route) route once daily at 6am., Disp: 100 each, Rfl: 3    polyethylene glycol (GLYCOLAX) 17 gram PwPk, Take 17 g by mouth once daily., Disp: 100 each, Rfl: 2    SSD 1 % cream, APPLY 1 APPLICATION TOPICALLY TWICE A DAY, Disp: , Rfl:     torsemide (DEMADEX) 20 MG Tab, Take 1 tablet " "(20 mg total) by mouth once daily., Disp: 90 tablet, Rfl: 1    triamcinolone acetonide 0.1% (KENALOG) 0.1 % cream, APPLY 1 APPLICATION ON THE SKIN TWICE A DAY AS NEEDED DO NOT APPLY TO FACE, AXILLA OR GROIN, Disp: , Rfl:     warfarin (COUMADIN) 5 MG tablet, Take 2.5 mg by mouth once daily. Pt states he takes 5mg Mon-Fri and 2.5mg Sat and Sun, Disp: , Rfl:     zolpidem (AMBIEN) 10 mg Tab, Take 1 tablet (10 mg total) by mouth nightly as needed (sleep)., Disp: 90 tablet, Rfl: 0    Review of Systems   Constitutional:  Negative for fatigue, fever and unexpected weight change.   Respiratory:  Negative for cough and shortness of breath.    Cardiovascular:  Negative for chest pain and leg swelling.   Gastrointestinal:  Negative for abdominal pain, constipation, nausea and vomiting.   Genitourinary:  Negative for dysuria, frequency and urgency.   Musculoskeletal:  Negative for arthralgias.   Skin:  Positive for rash.   Neurological:  Negative for dizziness, weakness and headaches.          Objective:      Vitals:    03/13/24 1040   BP: 112/68   Pulse: 77   SpO2: 99%   Weight: 109.3 kg (241 lb)   Height: 5' 6.5" (1.689 m)     Physical Exam  Vitals and nursing note reviewed.   Constitutional:       General: He is not in acute distress.     Appearance: Normal appearance. He is well-developed. He is obese.      Comments: cane   Neck:      Trachea: No tracheal deviation.   Cardiovascular:      Rate and Rhythm: Normal rate and regular rhythm.      Heart sounds: No murmur heard.     No friction rub. No gallop.   Pulmonary:      Breath sounds: Normal breath sounds. No stridor. No wheezing or rales.   Abdominal:      Palpations: Abdomen is soft. There is no mass.      Tenderness: There is no abdominal tenderness.   Musculoskeletal:         General: No tenderness or deformity.      Cervical back: Neck supple.   Lymphadenopathy:      Cervical: No cervical adenopathy.   Skin:     General: Skin is warm and dry.      Comments: Scalp with " folliculitis   Neurological:      Mental Status: He is alert and oriented to person, place, and time.      Coordination: Coordination normal.   Psychiatric:         Thought Content: Thought content normal.           Assessment:       1. Uncontrolled type 2 diabetes mellitus with hyperglycemia    2. High risk medication use    3. Permanent atrial fibrillation    4. Pure hypercholesterolemia    5. Chronic kidney disease (CKD), stage IV (severe)    6. Folliculitis         Plan:       Uncontrolled type 2 diabetes mellitus with hyperglycemia  Comments:  increase toujeo to40 units  Orders:  -     CBC Auto Differential; Future; Expected date: 03/13/2024  -     Comprehensive Metabolic Panel; Future; Expected date: 03/13/2024  -     Lipid Panel; Future; Expected date: 03/13/2024  -     TSH w/reflex to FT4; Future; Expected date: 03/13/2024  -     Microalbumin/Creatinine Ratio, Urine; Future; Expected date: 03/13/2024  -     Urinalysis, Reflex to Urine Culture Urine, Clean Catch; Future; Expected date: 03/13/2024  -     insulin glargine, TOUJEO, (TOUJEO SOLOSTAR U-300 INSULIN) 300 unit/mL (1.5 mL) InPn pen; Inject 40 Units into the skin once daily.  Dispense: 3 Pen; Refill: 4  -     POCT HEMOGLOBIN A1C    High risk medication use  -     CBC Auto Differential; Future; Expected date: 03/13/2024  -     Comprehensive Metabolic Panel; Future; Expected date: 03/13/2024  -     Lipid Panel; Future; Expected date: 03/13/2024  -     TSH w/reflex to FT4; Future; Expected date: 03/13/2024  -     Microalbumin/Creatinine Ratio, Urine; Future; Expected date: 03/13/2024  -     Urinalysis, Reflex to Urine Culture Urine, Clean Catch; Future; Expected date: 03/13/2024  -     insulin glargine, TOUJEO, (TOUJEO SOLOSTAR U-300 INSULIN) 300 unit/mL (1.5 mL) InPn pen; Inject 40 Units into the skin once daily.  Dispense: 3 Pen; Refill: 4    Permanent atrial fibrillation  Comments:  sees dr. salazar  Orders:  -     CBC Auto Differential; Future;  Expected date: 03/13/2024  -     Comprehensive Metabolic Panel; Future; Expected date: 03/13/2024  -     Lipid Panel; Future; Expected date: 03/13/2024  -     TSH w/reflex to FT4; Future; Expected date: 03/13/2024  -     Microalbumin/Creatinine Ratio, Urine; Future; Expected date: 03/13/2024  -     Urinalysis, Reflex to Urine Culture Urine, Clean Catch; Future; Expected date: 03/13/2024  -     insulin glargine, TOUJEO, (TOUJEO SOLOSTAR U-300 INSULIN) 300 unit/mL (1.5 mL) InPn pen; Inject 40 Units into the skin once daily.  Dispense: 3 Pen; Refill: 4    Pure hypercholesterolemia  Comments:  lovastatin  Orders:  -     CBC Auto Differential; Future; Expected date: 03/13/2024  -     Comprehensive Metabolic Panel; Future; Expected date: 03/13/2024  -     Lipid Panel; Future; Expected date: 03/13/2024  -     TSH w/reflex to FT4; Future; Expected date: 03/13/2024  -     Microalbumin/Creatinine Ratio, Urine; Future; Expected date: 03/13/2024  -     Urinalysis, Reflex to Urine Culture Urine, Clean Catch; Future; Expected date: 03/13/2024  -     insulin glargine, TOUJEO, (TOUJEO SOLOSTAR U-300 INSULIN) 300 unit/mL (1.5 mL) InPn pen; Inject 40 Units into the skin once daily.  Dispense: 3 Pen; Refill: 4    Chronic kidney disease (CKD), stage IV (severe)  Comments:  being followed by nephrology    Folliculitis  Comments:  doxycycline    Other orders  -     doxycycline (MONODOX) 100 MG capsule; Take 1 capsule (100 mg total) by mouth 2 (two) times daily.  Dispense: 14 capsule; Refill: 0      Follow up in about 2 months (around 5/13/2024), or if symptoms worsen or fail to improve, for medication management.        3/14/2024 Leana Perez

## 2024-03-18 ENCOUNTER — TELEPHONE (OUTPATIENT)
Dept: FAMILY MEDICINE | Facility: CLINIC | Age: 89
End: 2024-03-18
Payer: MEDICARE

## 2024-03-18 DIAGNOSIS — N18.9 CHRONIC KIDNEY DISEASE, UNSPECIFIED CKD STAGE: Primary | ICD-10-CM

## 2024-03-18 NOTE — TELEPHONE ENCOUNTER
Spoke with pt who states he did not have a good relationship with his previous kidney doctor and would like to try the clinic below.

## 2024-03-18 NOTE — TELEPHONE ENCOUNTER
----- Message from Kaity Cowart sent at 3/18/2024 12:49 PM CDT -----  Pt needs a referral to [facility]. Pt #[phone]

## 2024-03-26 ENCOUNTER — TELEPHONE (OUTPATIENT)
Dept: FAMILY MEDICINE | Facility: CLINIC | Age: 89
End: 2024-03-26
Payer: MEDICARE

## 2024-03-26 NOTE — TELEPHONE ENCOUNTER
----- Message from María Meade sent at 3/26/2024  1:49 PM CDT -----  -1:44- pt is calling back   [phone]

## 2024-03-26 NOTE — TELEPHONE ENCOUNTER
----- Message from Kaity Cowart sent at 3/26/2024 12:34 PM CDT -----  Pt is returning the office call. Pt #[phone]

## 2024-04-10 ENCOUNTER — PATIENT OUTREACH (OUTPATIENT)
Dept: ADMINISTRATIVE | Facility: HOSPITAL | Age: 89
End: 2024-04-10
Payer: MEDICARE

## 2024-04-10 NOTE — PROGRESS NOTES
Population Health Chart Review & Patient Outreach Details      Additional Pop Health Notes:               Updates Requested / Reviewed:      Updated Care Coordination Note         Health Maintenance Topics Overdue:      [facility] Score: 2     Urine Screening  Lipid Panel                       Health Maintenance Topic(s) Outreach Outcomes & Actions Taken:    Eye Exam - Outreach Outcomes & Actions Taken  : Diabetic Eye External Records Uploaded, Care Team & History Updated if Applicable

## 2024-04-17 DIAGNOSIS — G47.00 INSOMNIA, UNSPECIFIED TYPE: ICD-10-CM

## 2024-04-17 DIAGNOSIS — R60.9 EDEMA, UNSPECIFIED TYPE: ICD-10-CM

## 2024-04-17 RX ORDER — ZOLPIDEM TARTRATE 10 MG/1
10 TABLET ORAL NIGHTLY PRN
Qty: 90 TABLET | Refills: 0 | Status: SHIPPED | OUTPATIENT
Start: 2024-04-17

## 2024-04-17 RX ORDER — FUROSEMIDE 40 MG/1
40 TABLET ORAL DAILY PRN
Qty: 30 TABLET | Refills: 3 | Status: SHIPPED | OUTPATIENT
Start: 2024-04-17 | End: 2025-04-17

## 2024-04-17 NOTE — TELEPHONE ENCOUNTER
----- Message from Claudia Chung sent at 4/17/2024 10:23 AM CDT -----  Refill Zolpidem  and Furosemide  [facility]. Pt's # [phone]

## 2024-06-25 ENCOUNTER — TELEPHONE (OUTPATIENT)
Dept: FAMILY MEDICINE | Facility: CLINIC | Age: 89
End: 2024-06-25
Payer: MEDICARE

## 2024-06-25 NOTE — TELEPHONE ENCOUNTER
----- Message from Claudia Chung sent at 6/25/2024 10:18 AM CDT -----  The patient does not want to wait until September to be seen. That's the next appointment,He needs to be seen sooner. He wanted to let her know what his kidney  Said. Pt's # [phone]

## 2024-07-18 ENCOUNTER — TELEPHONE (OUTPATIENT)
Dept: FAMILY MEDICINE | Facility: CLINIC | Age: 89
End: 2024-07-18
Payer: MEDICARE

## 2024-07-18 DIAGNOSIS — E11.65 UNCONTROLLED TYPE 2 DIABETES MELLITUS WITH HYPERGLYCEMIA: Primary | ICD-10-CM

## 2024-07-18 NOTE — TELEPHONE ENCOUNTER
Spoke with patient and informed of pre visit fasting labs placed to be done prior to appointment. Patient will be coming to our office or [facility] to have labs drawn.

## 2024-07-19 ENCOUNTER — TELEPHONE (OUTPATIENT)
Dept: FAMILY MEDICINE | Facility: CLINIC | Age: 89
End: 2024-07-19
Payer: MEDICARE

## 2024-07-19 NOTE — TELEPHONE ENCOUNTER
Spoke to pt and he stated he wants to discuss an upcoming procedure with racheal. He stated he will talk to her at his visit

## 2024-07-19 NOTE — TELEPHONE ENCOUNTER
----- Message from Shirley Coreas sent at 7/19/2024  8:58 AM CDT -----  Pt needs a call from Leana if possible.Pt states call will just be 3 min.   [phone]

## 2024-07-22 ENCOUNTER — TELEPHONE (OUTPATIENT)
Dept: FAMILY MEDICINE | Facility: CLINIC | Age: 89
End: 2024-07-22
Payer: MEDICARE

## 2024-07-22 NOTE — TELEPHONE ENCOUNTER
Per Steph, patient came in for lab, she pulled A1C order, but not March orders therefore only A1C was collected. GUILLERMINA to Leana. Patient has office visit 7/31. She can add CBC to A1C so that was done. Others will need to be collected.

## 2024-07-23 LAB
BASOPHILS # BLD AUTO: 79 CELLS/UL (ref 0–200)
BASOPHILS NFR BLD AUTO: 0.9 %
EOSINOPHIL # BLD AUTO: 282 CELLS/UL (ref 15–500)
EOSINOPHIL NFR BLD AUTO: 3.2 %
ERYTHROCYTE [DISTWIDTH] IN BLOOD BY AUTOMATED COUNT: 14.1 % (ref 11–15)
HBA1C MFR BLD: 8.2 % OF TOTAL HGB
HCT VFR BLD AUTO: 43.4 % (ref 38.5–50)
HGB BLD-MCNC: 13.9 G/DL (ref 13.2–17.1)
LYMPHOCYTES # BLD AUTO: 1804 CELLS/UL (ref 850–3900)
LYMPHOCYTES NFR BLD AUTO: 20.5 %
MCH RBC QN AUTO: 28.3 PG (ref 27–33)
MCHC RBC AUTO-ENTMCNC: 32 G/DL (ref 32–36)
MCV RBC AUTO: 88.4 FL (ref 80–100)
MONOCYTES # BLD AUTO: 968 CELLS/UL (ref 200–950)
MONOCYTES NFR BLD AUTO: 11 %
NEUTROPHILS # BLD AUTO: 5667 CELLS/UL (ref 1500–7800)
NEUTROPHILS NFR BLD AUTO: 64.4 %
PLATELET # BLD AUTO: 195 THOUSAND/UL (ref 140–400)
PMV BLD REES-ECKER: 10.8 FL (ref 7.5–12.5)
RBC # BLD AUTO: 4.91 MILLION/UL (ref 4.2–5.8)
WBC # BLD AUTO: 8.8 THOUSAND/UL (ref 3.8–10.8)

## 2024-07-31 ENCOUNTER — OFFICE VISIT (OUTPATIENT)
Dept: FAMILY MEDICINE | Facility: CLINIC | Age: 89
End: 2024-07-31
Payer: MEDICARE

## 2024-07-31 VITALS
BODY MASS INDEX: 37.96 KG/M2 | SYSTOLIC BLOOD PRESSURE: 112 MMHG | OXYGEN SATURATION: 97 % | WEIGHT: 250.5 LBS | DIASTOLIC BLOOD PRESSURE: 70 MMHG | HEART RATE: 69 BPM | HEIGHT: 68 IN

## 2024-07-31 DIAGNOSIS — N18.30 STAGE 3 CHRONIC KIDNEY DISEASE, UNSPECIFIED WHETHER STAGE 3A OR 3B CKD: ICD-10-CM

## 2024-07-31 DIAGNOSIS — I10 PRIMARY HYPERTENSION: ICD-10-CM

## 2024-07-31 DIAGNOSIS — E11.22 TYPE 2 DIABETES MELLITUS WITH STAGE 2 CHRONIC KIDNEY DISEASE, WITH LONG-TERM CURRENT USE OF INSULIN: Primary | ICD-10-CM

## 2024-07-31 DIAGNOSIS — I48.0 PAROXYSMAL ATRIAL FIBRILLATION: ICD-10-CM

## 2024-07-31 DIAGNOSIS — Z79.4 TYPE 2 DIABETES MELLITUS WITH STAGE 2 CHRONIC KIDNEY DISEASE, WITH LONG-TERM CURRENT USE OF INSULIN: Primary | ICD-10-CM

## 2024-07-31 DIAGNOSIS — E78.2 MIXED HYPERLIPIDEMIA: ICD-10-CM

## 2024-07-31 DIAGNOSIS — G47.00 INSOMNIA, UNSPECIFIED TYPE: ICD-10-CM

## 2024-07-31 DIAGNOSIS — I25.10 CORONARY ARTERY DISEASE INVOLVING NATIVE CORONARY ARTERY OF NATIVE HEART WITHOUT ANGINA PECTORIS: ICD-10-CM

## 2024-07-31 DIAGNOSIS — L21.0 SEBORRHEA CAPITIS IN ADULT: ICD-10-CM

## 2024-07-31 DIAGNOSIS — N18.2 TYPE 2 DIABETES MELLITUS WITH STAGE 2 CHRONIC KIDNEY DISEASE, WITH LONG-TERM CURRENT USE OF INSULIN: Primary | ICD-10-CM

## 2024-07-31 DIAGNOSIS — Z99.89 AMBULATES WITH CANE: ICD-10-CM

## 2024-07-31 PROCEDURE — 99214 OFFICE O/P EST MOD 30 MIN: CPT | Mod: ,,, | Performed by: NURSE PRACTITIONER

## 2024-08-05 DIAGNOSIS — G47.00 INSOMNIA, UNSPECIFIED TYPE: ICD-10-CM

## 2024-08-05 RX ORDER — SILVER SULFADIAZINE 10 G/1000G
CREAM TOPICAL 2 TIMES DAILY
Qty: 400 G | Refills: 1 | Status: SHIPPED | OUTPATIENT
Start: 2024-08-05

## 2024-08-05 RX ORDER — DOXYCYCLINE 100 MG/1
100 CAPSULE ORAL 2 TIMES DAILY
Qty: 14 CAPSULE | Refills: 0 | Status: SHIPPED | OUTPATIENT
Start: 2024-08-05

## 2024-08-05 RX ORDER — ZOLPIDEM TARTRATE 10 MG/1
10 TABLET ORAL NIGHTLY PRN
Qty: 90 TABLET | Refills: 0 | Status: SHIPPED | OUTPATIENT
Start: 2024-08-05

## 2024-08-12 NOTE — PROGRESS NOTES
SUBJECTIVE:    Patient ID: Alex Hatch is a 89 y.o. male.    Chief Complaint: Follow-up (No Bottles//no refills//discuss rx for sleep//sliversulfadine rx//doxycyline//-ERL)    89-year-old male presents for check up.  Treated for htn, hyperlipidemia, atrial fibrillation, cad, ckd, dm2 and bph. He is being closely followed for diabetes. Today a1c is 8.2mg/dl . He is not taking insulin nightly as prescribed. Adjusts dose base on diet  Does not watch diet much. Sees dr. Lux regularly. Inr is monitored at his office. Using cane for ambulation.  Would like rx for folliculitis. Took doxycycline in the past with good results. Scheduled to see dr. Morrison next month. Recently had pacemaker generator replaced    Follow-up  Associated symptoms include arthralgias and a rash. Pertinent negatives include no abdominal pain, chest pain, coughing, fatigue, fever, headaches, nausea, sore throat, vomiting or weakness.       Telephone on 07/18/2024   Component Date Value Ref Range Status    Hemoglobin A1C 07/22/2024 8.2 (H)  <5.7 % of total Hgb Final    WBC 07/22/2024 8.8  3.8 - 10.8 Thousand/uL Final    RBC 07/22/2024 4.91  4.20 - 5.80 Million/uL Final    Hemoglobin 07/22/2024 13.9  13.2 - 17.1 g/dL Final    Hematocrit 07/22/2024 43.4  38.5 - 50.0 % Final    MCV 07/22/2024 88.4  80.0 - 100.0 fL Final    MCH 07/22/2024 28.3  27.0 - 33.0 pg Final    MCHC 07/22/2024 32.0  32.0 - 36.0 g/dL Final    RDW 07/22/2024 14.1  11.0 - 15.0 % Final    Platelets 07/22/2024 195  140 - 400 Thousand/uL Final    MPV 07/22/2024 10.8  7.5 - 12.5 fL Final    Neutrophils, Abs 07/22/2024 5,667  1,500 - 7,800 cells/uL Final    Lymph # 07/22/2024 1,804  850 - 3,900 cells/uL Final    Mono # 07/22/2024 968 (H)  200 - 950 cells/uL Final    Eos # 07/22/2024 282  15 - 500 cells/uL Final    Baso # 07/22/2024 79  0 - 200 cells/uL Final    Neutrophils Relative 07/22/2024 64.4  % Final    Lymph % 07/22/2024 20.5  % Final    Mono % 07/22/2024 11.0  % Final     Eosinophil % 07/22/2024 3.2  % Final    Basophil % 07/22/2024 0.9  % Final   Patient Outreach on 04/10/2024   Component Date Value Ref Range Status    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   Final    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   Final   Office Visit on 03/13/2024   Component Date Value Ref Range Status    Hemoglobin A1C, POC 03/13/2024 8.0  % Final       Past Medical History:   Diagnosis Date    Atrial fibrillation     Dr Lux    CKD (chronic kidney disease), stage III     Coronary artery disease     Diabetes mellitus, type 2     Hyperlipidemia     Hypertension      Social History     Socioeconomic History    Marital status:    Tobacco Use    Smoking status: Never    Smokeless tobacco: Never   Substance and Sexual Activity    Alcohol use: No    Drug use: No     Past Surgical History:   Procedure Laterality Date    CARDIAC PACEMAKER PLACEMENT  05/2013    Dr Lux    CARDIAC PACEMAKER PLACEMENT  07/23/2024    COLONOSCOPY W/ POLYPECTOMY  2011    Dr Mar, repeat in 3 years    CORONARY ARTERY BYPASS GRAFT  1993    CYSTOSCOPY N/A 09/29/2021    Procedure: CYSTOSCOPY;  Surgeon: Ramona Skaggs Jr., MD;  Location: [facility] OR;  Service: Urology;  Laterality: N/A;    cassandra      TRANSRECTAL ULTRASOUND EXAMINATION N/A 09/29/2021    Procedure: ULTRASOUND, RECTAL APPROACH;  Surgeon: Ramona Skaggs Jr., MD;  Location: [facility] OR;  Service: Urology;  Laterality: N/A;    TRANSURETHRAL RESECTION OF PROSTATE N/A 10/26/2021    Procedure: TURP (TRANSURETHRAL RESECTION OF PROSTATE);  Surgeon: Ramona Skaggs Jr., MD;  Location: [facility] OR;  Service: Urology;  Laterality: N/A;     No family history on file.    All of your core healthy metrics are met.      Review of patient's allergies indicates:   Allergen Reactions    Macrobid [nitrofurantoin monohyd/m-cryst]     Iodinated contrast media      Betadine ok    Keflex [cephalexin]      diarrhea    Sitagliptin phosphate      Other reaction(s): Other (See Comments)  "      Current Outpatient Medications:     benazepriL (LOTENSIN) 10 MG tablet, Take 10 mg by mouth., Disp: , Rfl:     blood sugar diagnostic Strp, To check BG 3 times daily, to use with insurance preferred meter, Disp: 100 each, Rfl: 0    furosemide (LASIX) 40 MG tablet, Take 1 tablet (40 mg total) by mouth daily as needed., Disp: 30 tablet, Rfl: 3    insulin glargine, TOUJEO, (TOUJEO SOLOSTAR U-300 INSULIN) 300 unit/mL (1.5 mL) InPn pen, Inject 40 Units into the skin once daily., Disp: 3 Pen, Rfl: 4    lancets Misc, To check BG 3 times daily, to use with insurance preferred meter, Disp: 100 each, Rfl: 0    lovastatin (MEVACOR) 20 MG tablet, Take 1 tablet (20 mg total) by mouth every evening., Disp: 90 tablet, Rfl: 1    nitroGLYCERIN (NITROSTAT) 0.4 MG SL tablet, Place 0.4 mg under the tongue., Disp: , Rfl:     pen needle, diabetic 32 gauge x 5/32" Ndle, 1 each by Misc.(Non-Drug; Combo Route) route once daily at 6am., Disp: 100 each, Rfl: 3    warfarin (COUMADIN) 5 MG tablet, Take 2.5 mg by mouth once daily. Pt states he takes 5mg Mon-Fri and 2.5mg Sat and Sun, Disp: , Rfl:     blood-glucose meter kit, To check BG 3 times daily, to use with insurance preferred meter, Disp: 1 each, Rfl: 0    calcitRIOL (ROCALTROL) 0.25 MCG Cap, Take 0.25 mcg by mouth every 7 days., Disp: , Rfl:     doxycycline (MONODOX) 100 MG capsule, Take 1 capsule (100 mg total) by mouth 2 (two) times daily., Disp: 14 capsule, Rfl: 0    mupirocin (BACTROBAN) 2 % ointment, Apply topically 2 (two) times daily. (Patient not taking: Reported on 7/31/2024), Disp: 22 g, Rfl: 11    SSD 1 % cream, Apply topically 2 (two) times daily., Disp: 400 g, Rfl: 1    zolpidem (AMBIEN) 10 mg Tab, Take 1 tablet (10 mg total) by mouth nightly as needed (sleep)., Disp: 90 tablet, Rfl: 0    Review of Systems   Constitutional:  Negative for fatigue, fever and unexpected weight change.   HENT:  Negative for ear pain, sinus pressure and sore throat.    Eyes:  Negative for " "pain.   Respiratory:  Negative for cough and shortness of breath.    Cardiovascular:  Negative for chest pain and leg swelling.   Gastrointestinal:  Negative for abdominal pain, constipation, nausea and vomiting.   Genitourinary:  Negative for dysuria, frequency and urgency.   Musculoskeletal:  Positive for arthralgias.   Skin:  Positive for rash.   Neurological:  Negative for dizziness, weakness and headaches.   Psychiatric/Behavioral:  Positive for sleep disturbance.           Objective:      Vitals:    07/31/24 1150   BP: 112/70   Pulse: 69   SpO2: 97%   Weight: 113.6 kg (250 lb 8 oz)   Height: 5' 8" (1.727 m)     Physical Exam  Vitals and nursing note reviewed.   Constitutional:       General: He is not in acute distress.     Appearance: Normal appearance. He is well-developed. He is obese.   HENT:      Right Ear: External ear normal.      Left Ear: External ear normal.   Eyes:      Pupils: Pupils are equal, round, and reactive to light.   Neck:      Trachea: No tracheal deviation.   Cardiovascular:      Rate and Rhythm: Normal rate and regular rhythm.      Heart sounds: No murmur heard.     No friction rub. No gallop.   Pulmonary:      Breath sounds: Normal breath sounds. No stridor. No wheezing or rales.   Abdominal:      Palpations: Abdomen is soft. There is no mass.      Tenderness: There is no abdominal tenderness.   Musculoskeletal:         General: No tenderness or deformity.      Cervical back: Neck supple.   Lymphadenopathy:      Cervical: No cervical adenopathy.   Skin:     General: Skin is warm and dry.   Neurological:      Mental Status: He is alert and oriented to person, place, and time.      Coordination: Coordination normal.   Psychiatric:         Thought Content: Thought content normal.           Assessment:       1. Type 2 diabetes mellitus with stage 2 chronic kidney disease, with long-term current use of insulin    2. Primary hypertension    3. Stage 3 chronic kidney disease, unspecified " whether stage 3a or 3b CKD    4. Paroxysmal atrial fibrillation    5. Coronary artery disease involving native coronary artery of native heart without angina pectoris    6. Mixed hyperlipidemia    7. Seborrhea capitis in adult    8. Insomnia, unspecified type    9. Ambulates with cane         Plan:       Type 2 diabetes mellitus with stage 2 chronic kidney disease, with long-term current use of insulin  Comments:  8.2mg/dl. discuss taking toujeo daily    Primary hypertension  Comments:  bp controlled    Stage 3 chronic kidney disease, unspecified whether stage 3a or 3b CKD  Comments:  followed by dr. lynch    Paroxysmal atrial fibrillation  Comments:  managed by cardio    Coronary artery disease involving native coronary artery of native heart without angina pectoris    Mixed hyperlipidemia  Comments:  mevacor    Seborrhea capitis in adult  Comments:  silvadene    Insomnia, unspecified type  Comments:  ambien    Ambulates with cane    Other orders  -     SSD 1 % cream; Apply topically 2 (two) times daily.  Dispense: 400 g; Refill: 1  -     doxycycline (MONODOX) 100 MG capsule; Take 1 capsule (100 mg total) by mouth 2 (two) times daily.  Dispense: 14 capsule; Refill: 0      Follow up in about 3 months (around 10/31/2024), or if symptoms worsen or fail to improve, for medication management, Diabetic Check-Up.        8/12/2024 Leana Perez

## 2024-09-16 DIAGNOSIS — E78.00 PURE HYPERCHOLESTEROLEMIA: ICD-10-CM

## 2024-09-16 DIAGNOSIS — I48.21 PERMANENT ATRIAL FIBRILLATION: ICD-10-CM

## 2024-09-16 DIAGNOSIS — G47.00 INSOMNIA, UNSPECIFIED TYPE: ICD-10-CM

## 2024-09-16 DIAGNOSIS — Z79.899 HIGH RISK MEDICATION USE: ICD-10-CM

## 2024-09-16 DIAGNOSIS — E11.65 UNCONTROLLED TYPE 2 DIABETES MELLITUS WITH HYPERGLYCEMIA: ICD-10-CM

## 2024-09-16 RX ORDER — ZOLPIDEM TARTRATE 10 MG/1
10 TABLET ORAL NIGHTLY PRN
Qty: 90 TABLET | Refills: 0 | Status: SHIPPED | OUTPATIENT
Start: 2024-09-16

## 2024-09-16 RX ORDER — INSULIN GLARGINE 300 [IU]/ML
40 INJECTION, SOLUTION SUBCUTANEOUS DAILY
Qty: 3 ML | Refills: 3 | Status: SHIPPED | OUTPATIENT
Start: 2024-09-16 | End: 2025-09-16

## 2024-09-16 NOTE — TELEPHONE ENCOUNTER
----- Message from Tiffanie Reardon sent at 9/16/2024  9:18 AM CDT -----  Pt needs a refill on insulin and ambein sleeping pills to be sent to [facility] on [address]    [phone]

## 2024-09-18 ENCOUNTER — TELEPHONE (OUTPATIENT)
Dept: FAMILY MEDICINE | Facility: CLINIC | Age: 89
End: 2024-09-18
Payer: MEDICARE

## 2024-09-18 NOTE — TELEPHONE ENCOUNTER
----- Message from Shirley Coreas sent at 9/18/2024 12:53 PM CDT -----  Returning a phone call   [phone]

## 2024-10-31 ENCOUNTER — OFFICE VISIT (OUTPATIENT)
Dept: FAMILY MEDICINE | Facility: CLINIC | Age: 89
End: 2024-10-31
Payer: MEDICARE

## 2024-10-31 VITALS
BODY MASS INDEX: 39.13 KG/M2 | OXYGEN SATURATION: 99 % | SYSTOLIC BLOOD PRESSURE: 132 MMHG | HEIGHT: 68 IN | HEART RATE: 70 BPM | DIASTOLIC BLOOD PRESSURE: 82 MMHG | WEIGHT: 258.19 LBS

## 2024-10-31 DIAGNOSIS — Z79.899 HIGH RISK MEDICATION USE: ICD-10-CM

## 2024-10-31 DIAGNOSIS — Z99.89 AMBULATES WITH CANE: ICD-10-CM

## 2024-10-31 DIAGNOSIS — E78.2 MIXED HYPERLIPIDEMIA: ICD-10-CM

## 2024-10-31 DIAGNOSIS — I25.10 CORONARY ARTERY DISEASE INVOLVING NATIVE CORONARY ARTERY OF NATIVE HEART WITHOUT ANGINA PECTORIS: ICD-10-CM

## 2024-10-31 DIAGNOSIS — I48.21 PERMANENT ATRIAL FIBRILLATION: ICD-10-CM

## 2024-10-31 DIAGNOSIS — N18.30 STAGE 3 CHRONIC KIDNEY DISEASE, UNSPECIFIED WHETHER STAGE 3A OR 3B CKD: ICD-10-CM

## 2024-10-31 DIAGNOSIS — R60.9 EDEMA, UNSPECIFIED TYPE: ICD-10-CM

## 2024-10-31 DIAGNOSIS — E11.65 UNCONTROLLED TYPE 2 DIABETES MELLITUS WITH HYPERGLYCEMIA: Primary | ICD-10-CM

## 2024-10-31 DIAGNOSIS — M17.0 PRIMARY OSTEOARTHRITIS OF BOTH KNEES: ICD-10-CM

## 2024-10-31 DIAGNOSIS — I10 PRIMARY HYPERTENSION: ICD-10-CM

## 2024-10-31 DIAGNOSIS — G47.00 INSOMNIA, UNSPECIFIED TYPE: ICD-10-CM

## 2024-10-31 DIAGNOSIS — E66.01 SEVERE OBESITY (BMI 35.0-39.9) WITH COMORBIDITY: ICD-10-CM

## 2024-10-31 LAB — HBA1C MFR BLD: 9.3 %

## 2024-10-31 RX ORDER — INSULIN GLARGINE 300 U/ML
45 INJECTION, SOLUTION SUBCUTANEOUS DAILY
Qty: 9 ML | Refills: 11 | Status: SHIPPED | OUTPATIENT
Start: 2024-10-31 | End: 2025-10-31

## 2024-11-07 DIAGNOSIS — R60.9 EDEMA, UNSPECIFIED TYPE: ICD-10-CM

## 2024-11-07 RX ORDER — FUROSEMIDE 40 MG/1
40 TABLET ORAL DAILY PRN
Qty: 30 TABLET | Refills: 3 | Status: SHIPPED | OUTPATIENT
Start: 2024-11-07 | End: 2025-11-07

## 2024-11-07 NOTE — TELEPHONE ENCOUNTER
----- Message from Claudia sent at 11/7/2024 12:24 PM CST -----  Refill  [facility] on [address] Pt's # [phone]

## 2025-02-04 DIAGNOSIS — G47.00 INSOMNIA, UNSPECIFIED TYPE: ICD-10-CM

## 2025-02-04 DIAGNOSIS — R60.9 EDEMA, UNSPECIFIED TYPE: ICD-10-CM

## 2025-02-04 RX ORDER — FUROSEMIDE 40 MG/1
40 TABLET ORAL DAILY PRN
Qty: 30 TABLET | Refills: 1 | Status: SHIPPED | OUTPATIENT
Start: 2025-02-04

## 2025-02-04 RX ORDER — ZOLPIDEM TARTRATE 10 MG/1
10 TABLET ORAL NIGHTLY PRN
Qty: 90 TABLET | Refills: 0 | Status: SHIPPED | OUTPATIENT
Start: 2025-02-04

## 2025-02-04 RX ORDER — FUROSEMIDE 20 MG/1
20 TABLET ORAL DAILY
Qty: 30 TABLET | Refills: 1 | Status: SHIPPED | OUTPATIENT
Start: 2025-02-04

## 2025-02-04 NOTE — TELEPHONE ENCOUNTER
----- Message from María sent at 2/4/2025 10:37 AM CST -----  Pt needs refill on furosemide last time he was told to take a pill and 1/2, it is hard to cut in half and would like a stronger dose is possible, james  [facility]ha   [phone]

## 2025-03-07 LAB
LEFT EYE DM RETINOPATHY: NEGATIVE
RIGHT EYE DM RETINOPATHY: NEGATIVE

## 2025-03-20 ENCOUNTER — TELEPHONE (OUTPATIENT)
Dept: FAMILY MEDICINE | Facility: CLINIC | Age: 89
End: 2025-03-20
Payer: MEDICARE

## 2025-03-20 NOTE — TELEPHONE ENCOUNTER
Spoke with patient, he was returning a call. I let him know it doesn't look like we called him. States he does not need anything at this time.

## 2025-04-04 ENCOUNTER — OFFICE VISIT (OUTPATIENT)
Dept: FAMILY MEDICINE | Facility: CLINIC | Age: 89
End: 2025-04-04
Payer: MEDICARE

## 2025-04-04 VITALS
HEIGHT: 68 IN | HEART RATE: 57 BPM | BODY MASS INDEX: 38.55 KG/M2 | WEIGHT: 254.38 LBS | OXYGEN SATURATION: 96 % | SYSTOLIC BLOOD PRESSURE: 132 MMHG | DIASTOLIC BLOOD PRESSURE: 72 MMHG

## 2025-04-04 DIAGNOSIS — E11.65 UNCONTROLLED TYPE 2 DIABETES MELLITUS WITH HYPERGLYCEMIA: Primary | ICD-10-CM

## 2025-04-04 DIAGNOSIS — N18.4 CHRONIC KIDNEY DISEASE (CKD), STAGE IV (SEVERE): ICD-10-CM

## 2025-04-04 DIAGNOSIS — I25.10 CORONARY ARTERY DISEASE INVOLVING NATIVE CORONARY ARTERY OF NATIVE HEART WITHOUT ANGINA PECTORIS: ICD-10-CM

## 2025-04-04 DIAGNOSIS — E78.2 MIXED HYPERLIPIDEMIA: ICD-10-CM

## 2025-04-04 DIAGNOSIS — Z99.89 AMBULATES WITH CANE: ICD-10-CM

## 2025-04-04 DIAGNOSIS — G47.00 INSOMNIA, UNSPECIFIED TYPE: ICD-10-CM

## 2025-04-04 DIAGNOSIS — I10 PRIMARY HYPERTENSION: ICD-10-CM

## 2025-04-04 DIAGNOSIS — M17.0 PRIMARY OSTEOARTHRITIS OF BOTH KNEES: ICD-10-CM

## 2025-04-04 DIAGNOSIS — E11.22 TYPE 2 DIABETES MELLITUS WITH STAGE 2 CHRONIC KIDNEY DISEASE, WITH LONG-TERM CURRENT USE OF INSULIN: ICD-10-CM

## 2025-04-04 DIAGNOSIS — N18.2 TYPE 2 DIABETES MELLITUS WITH STAGE 2 CHRONIC KIDNEY DISEASE, WITH LONG-TERM CURRENT USE OF INSULIN: ICD-10-CM

## 2025-04-04 DIAGNOSIS — I48.0 PAROXYSMAL ATRIAL FIBRILLATION: ICD-10-CM

## 2025-04-04 DIAGNOSIS — H43.12 VITREOUS HEMORRHAGE, LEFT: ICD-10-CM

## 2025-04-04 DIAGNOSIS — E66.01 SEVERE OBESITY (BMI 35.0-39.9) WITH COMORBIDITY: ICD-10-CM

## 2025-04-04 DIAGNOSIS — Z79.4 TYPE 2 DIABETES MELLITUS WITH STAGE 2 CHRONIC KIDNEY DISEASE, WITH LONG-TERM CURRENT USE OF INSULIN: ICD-10-CM

## 2025-04-04 DIAGNOSIS — F32.1 MAJOR DEPRESSIVE DISORDER, SINGLE EPISODE, MODERATE: ICD-10-CM

## 2025-04-04 DIAGNOSIS — R60.9 EDEMA, UNSPECIFIED TYPE: ICD-10-CM

## 2025-04-04 LAB — HBA1C MFR BLD: 9.2 %

## 2025-04-04 PROCEDURE — 99214 OFFICE O/P EST MOD 30 MIN: CPT | Mod: S$GLB,,, | Performed by: NURSE PRACTITIONER

## 2025-04-04 PROCEDURE — 83036 HEMOGLOBIN GLYCOSYLATED A1C: CPT | Mod: QW,,, | Performed by: NURSE PRACTITIONER

## 2025-04-04 RX ORDER — PREDNISOLONE ACETATE 10 MG/ML
1 SUSPENSION/ DROPS OPHTHALMIC 4 TIMES DAILY
COMMUNITY
Start: 2025-02-27

## 2025-04-04 NOTE — LETTER
AUTHORIZATION FOR RELEASE OF   CONFIDENTIAL INFORMATION    Dear Dr Zafar,    We are seeing Alex Hatch, date of birth 1935, in the clinic at [facility]. Leana Perez NP is the patient's PCP. Alex Hatch has an outstanding lab/procedure at the time we reviewed his chart. In order to help keep his health information updated, he has authorized us to request the following medical record(s):        (  )  MAMMOGRAM                                      (  )  COLONOSCOPY      (  )  PAP SMEAR                                          (  )  OUTSIDE LAB RESULTS     (  )  DEXA SCAN                                          ( X )  EYE EXAM            (  )  FOOT EXAM                                          (  )  ENTIRE RECORD     (  )  OUTSIDE IMMUNIZATIONS                 (  )  _______________         Please fax records to [facility], Powell, Jodi, NP, [phone]     If you have any questions, please contact Zara at [phone].           Patient Name: Alex Hatch  : 1935  Patient Phone #: [phone]

## 2025-04-08 NOTE — PROGRESS NOTES
SUBJECTIVE:    Patient ID: Alex Hatch is a 89 y.o. male.    Chief Complaint: Follow-up (No bottles//Pt is here for a follow up//Eye exam requested from Dr Zafar//SOY)      History of Present Illness    CHIEF COMPLAINT:  89 year old male  presents today for follow up of multiple chronic conditions    OPHTHALMOLOGIC:  He experienced blood vessel filling his eye approximately 6 weeks ago. He now has significant visual changes in the affected eye, only able to perceive light sources and vague figures. He denies eye irritation.    MUSCULOSKELETAL:  He reports receiving a gel injection for knee problems without relief. He has balance problems and frequently feels at risk of falling due to knee issues. He wears a knee brace which helps prevent lateral movement but does not fully address the bone-on-bone discomfort.    DIABETES:  He currently takes 35 units of insulin for diabetes management.    SLEEP:  He initially sleeps for 4-5 hours, then wakes up and watches television for a few hours before returning to sleep for an additional couple of hours. He continues Ambien for sleep management with reported satisfaction.      ROS:  General: -fever, -chills, -fatigue, -weight gain, -weight loss, +difficulty staying asleep, +sleep disturbances  Eyes: -vision changes, +redness, -discharge, +loss of vision  ENT: -ear pain, -nasal congestion, -sore throat  Cardiovascular: -chest pain, -palpitations, -lower extremity edema  Respiratory: -cough, -shortness of breath  Gastrointestinal: -abdominal pain, -nausea, -vomiting, -diarrhea, -constipation, -blood in stool  Genitourinary: -dysuria, -hematuria, -frequency  Musculoskeletal: +joint pain, -muscle pain, +difficulty walking  Skin: -rash, -lesion  Neurological: -headache, -dizziness, -numbness, -tingling, +balance issues  Psychiatric: -anxiety, -depression, -sleep difficulty              Orders Only on 04/04/2025   Component Date Value Ref Range Status    Left Eye DM  Retinopathy 03/07/2025 Negative   Final    Right Eye DM Retinopathy 03/07/2025 Negative   Final   Office Visit on 04/04/2025   Component Date Value Ref Range Status    Hemoglobin A1C, POC 04/04/2025 9.2  % Final   Office Visit on 10/31/2024   Component Date Value Ref Range Status    Hemoglobin A1C, POC 10/31/2024 9.3  % Final    Cholesterol 03/13/2025 141  <200 mg/dL Final    HDL 03/13/2025 37 (L)  > OR = 40 mg/dL Final    Triglycerides 03/13/2025 77  <150 mg/dL Final    LDL Cholesterol 03/13/2025 87  mg/dL (calc) Final    HDL/Cholesterol Ratio 03/13/2025 3.8  <5.0 (calc) Final    Non HDL Chol. (LDL+VLDL) 03/13/2025 104  <130 mg/dL (calc) Final    WBC 03/13/2025 9.2  3.8 - 10.8 Thousand/uL Final    RBC 03/13/2025 5.22  4.20 - 5.80 Million/uL Final    Hemoglobin 03/13/2025 14.2  13.2 - 17.1 g/dL Final    Hematocrit 03/13/2025 44.5  38.5 - 50.0 % Final    MCV 03/13/2025 85.2  80.0 - 100.0 fL Final    MCH 03/13/2025 27.2  27.0 - 33.0 pg Final    MCHC 03/13/2025 31.9 (L)  32.0 - 36.0 g/dL Final    RDW 03/13/2025 14.4  11.0 - 15.0 % Final    Platelets 03/13/2025 203  140 - 400 Thousand/uL Final    MPV 03/13/2025 10.9  7.5 - 12.5 fL Final    Neutrophils, Abs 03/13/2025 5,391  1,500 - 7,800 cells/uL Final    Lymph # 03/13/2025 2,328  850 - 3,900 cells/uL Final    Mono # 03/13/2025 994 (H)  200 - 950 cells/uL Final    Eos # 03/13/2025 442  15 - 500 cells/uL Final    Baso # 03/13/2025 46  0 - 200 cells/uL Final    Neutrophils Relative 03/13/2025 58.6  % Final    Lymph % 03/13/2025 25.3  % Final    Mono % 03/13/2025 10.8  % Final    Eosinophil % 03/13/2025 4.8  % Final    Basophil % 03/13/2025 0.5  % Final       Past Medical History:   Diagnosis Date    Atrial fibrillation     Dr Lux    CKD (chronic kidney disease), stage III     Coronary artery disease     Diabetes mellitus, type 2     Hyperlipidemia     Hypertension      Past Surgical History:   Procedure Laterality Date    CARDIAC PACEMAKER PLACEMENT  05/2013      Lux    CARDIAC PACEMAKER PLACEMENT  07/23/2024    COLONOSCOPY W/ POLYPECTOMY  2011    Dr Mar, repeat in 3 years    CORONARY ARTERY BYPASS GRAFT  1993    CYSTOSCOPY N/A 09/29/2021    Procedure: CYSTOSCOPY;  Surgeon: Ramona Skaggs Jr., MD;  Location: [facility] OR;  Service: Urology;  Laterality: N/A;    cassandra      TRANSRECTAL ULTRASOUND EXAMINATION N/A 09/29/2021    Procedure: ULTRASOUND, RECTAL APPROACH;  Surgeon: Ramona Skaggs Jr., MD;  Location: [facility] OR;  Service: Urology;  Laterality: N/A;    TRANSURETHRAL RESECTION OF PROSTATE N/A 10/26/2021    Procedure: TURP (TRANSURETHRAL RESECTION OF PROSTATE);  Surgeon: Ramona Skaggs Jr., MD;  Location: [facility] OR;  Service: Urology;  Laterality: N/A;     No family history on file.    All of your core healthy metrics are met.      The CVD Risk score (LYNNETTE'Agostino, et al., 2008) failed to calculate for the following reasons:    The 2008 CVD risk score is only valid for ages 30 to 74     Marital Status:   Alcohol History:  reports no history of alcohol use.  Tobacco History:  reports that he has never smoked. He has never used smokeless tobacco.  Drug History:  reports no history of drug use.    Health Maintenance Topics with due status: Not Due       Topic Last Completion Date    TETANUS VACCINE 03/30/2019    Diabetic Eye Exam 03/07/2025    Lipid Panel 03/13/2025    Hemoglobin A1c 04/04/2025     Immunization History   Administered Date(s) Administered    COVID-19, MRNA, LN-S, PF (Pfizer) (Gray Cap) 05/19/2022    COVID-19, MRNA, LN-S, PF (Pfizer) (Purple Cap) 03/05/2021, 03/26/2021, 10/18/2021    Influenza (FLUAD) - Quadrivalent - Adjuvanted - PF *Preferred* (65+) 09/14/2023    Influenza - Quadrivalent - High Dose - PF (65 years and older) 09/02/2020, 10/04/2021, 10/06/2022    Influenza - Quadrivalent - PF *Preferred* (6 months and older) 09/03/2013    Influenza - Trivalent - Afluria, Fluzone MDV 08/22/2010    Influenza - Trivalent - Fluzone High Dose - PF (65 years and  "older) 09/19/2015, 09/10/2016, 09/22/2018, 10/01/2019    Influenza Whole 09/18/2014    Pneumococcal Conjugate - 13 Valent 03/30/2019    Pneumococcal Polysaccharide - 23 Valent 01/15/2014    Tdap 03/30/2019    Zoster Recombinant 03/30/2019, 06/27/2019       Review of patient's allergies indicates:   Allergen Reactions    Macrobid [nitrofurantoin monohyd/m-cryst]     Iodinated contrast media      Betadine ok    Keflex [cephalexin]      diarrhea    Sitagliptin phosphate      Other reaction(s): Other (See Comments)     Current Medications[1]        Objective:      Vitals:    04/04/25 0829   BP: 132/72   Pulse: (!) 57   SpO2: 96%   Weight: 115.4 kg (254 lb 6.4 oz)   Height: 5' 8" (1.727 m)       Physical Exam  Vitals and nursing note reviewed.   Constitutional:       General: He is not in acute distress.     Appearance: Normal appearance. He is well-developed. He is obese.   HENT:      Right Ear: External ear normal.      Left Ear: External ear normal.   Neck:      Trachea: No tracheal deviation.   Cardiovascular:      Rate and Rhythm: Normal rate and regular rhythm.      Heart sounds: No murmur heard.     No friction rub. No gallop.   Pulmonary:      Breath sounds: Normal breath sounds. No stridor. No wheezing or rales.   Abdominal:      Palpations: Abdomen is soft. There is no mass.      Tenderness: There is no abdominal tenderness.   Musculoskeletal:         General: No tenderness or deformity.      Cervical back: Neck supple.      Right knee: Decreased range of motion.      Left knee: Decreased range of motion.      Right lower leg: Edema present.   Lymphadenopathy:      Cervical: No cervical adenopathy.   Skin:     General: Skin is warm and dry.   Neurological:      Mental Status: He is alert and oriented to person, place, and time.      Coordination: Coordination normal.   Psychiatric:         Thought Content: Thought content normal.            Assessment:       1. Uncontrolled type 2 diabetes mellitus with " hyperglycemia    2. Severe obesity (BMI 35.0-39.9) with comorbidity    3. Major depressive disorder, single episode, moderate    4. Chronic kidney disease (CKD), stage IV (severe)    5. Type 2 diabetes mellitus with stage 2 chronic kidney disease, with long-term current use of insulin    6. Mixed hyperlipidemia    7. Coronary artery disease involving native coronary artery of native heart without angina pectoris    8. Insomnia, unspecified type    9. Edema, unspecified type    10. Paroxysmal atrial fibrillation    11. Primary osteoarthritis of both knees    12. Primary hypertension    13. Ambulates with cane    14. Vitreous hemorrhage, left           Assessment & Plan    - Assessed glucose control, noting poor management.  - Increased insulin dosage from 35 to 40 units to improve glycemic control.  - Evaluated sleep patterns, noting fragmented sleep but decided to maintain current sleep medication regimen with Ambien.  - Discussed knee pain and mobility issues, acknowledging limited treatment options given aversion to surgery.  - Reviewed recent eye complication (blood vessel rupture), noting ongoing visual impairment and limited treatment options presented by ophthalmologist.    TYPE 2 DIABETES MELLITUS WITH DIABETIC CHRONIC KIDNEY DISEASE:  - Evaluated blood sugar and determined they are not well controlled.  - Increased insulin dosage from 35 to 40 units to improve glycemic control.  - Discussed poor diabetes control with the patient, emphasizing the need for increased insulin dosage.  - Alex is currently on long-term insulin therapy.    MAJOR DEPRESSIVE DISORDER, SINGLE EPISODE, MODERATE:  - Alex expresses feeling down due to medical issues and healthcare experiences.  Declines medication  CHRONIC KIDNEY DISEASE (CKD), STAGE IV (SEVERE):  - Acknowledged the patient's stage 4 severe kidney disease and reviewed recent lab work.  - Noted that the patient has been prescribed calcium by another  physician.    VITREOUS HEMORRHAGE, LEFT EYE:  - Alex reports onset of vitreous hemorrhage six weeks ago, presenting as a heavy shadow across the left eye with persistent visual impairment.  - Examined the eye and took photographs to monitor progress.  - Discussed prognosis with the patient, indicating uncertainty about improvement.  - Initially considered surgery but later dismissed it.  - Noted previous treatment included injections.    BILATERAL PRIMARY OSTEOARTHRITIS OF KNEE:  - Alex reports ongoing knee pain, difficulty walking, and a bone-on-bone sensation.  - Previous treatment with gel shots provided no relief.  - Surgery is not a viable option at this time.  - Advised to continue wearing a knee brace for stability and suggested considering pain management options for inflammation and pain relief.    INSOMNIA DUE TO MEDICAL CONDITION:  - Maintain current sleep medication regimen with Ambien.  - Alex reports waking up throughout the night due to work-related anxiety.  - Assessed sleep pattern of 4-5 hours followed by 2 hours of wakefulness before sleeping again as acceptable for their age.  - Alex wishes to continue with the current Ambien medication.    UNSTEADINESS ON FEET:  - Alex reports loss of equilibrium and frequent near-falls.        Plan:       1. Uncontrolled type 2 diabetes mellitus with hyperglycemia  Comments:  hga1c 9.2 increased insulin. work on diet  Orders:  -     POCT HEMOGLOBIN A1C    2. Severe obesity (BMI 35.0-39.9) with comorbidity    3. Major depressive disorder, single episode, moderate    4. Chronic kidney disease (CKD), stage IV (severe)  Comments:  followed by dr. lynch    5. Type 2 diabetes mellitus with stage 2 chronic kidney disease, with long-term current use of insulin    6. Mixed hyperlipidemia  Comments:  lovastatin    7. Coronary artery disease involving native coronary artery of native heart without angina pectoris    8. Insomnia, unspecified  type  Comments:  ambien    9. Edema, unspecified type  Comments:  lasix    10. Paroxysmal atrial fibrillation  Comments:  followed by cardio. coumadin  Overview:  Dr Lux      11. Primary osteoarthritis of both knees  Comments:  has received injections in the past. does not want anything at this time    12. Primary hypertension  Comments:  bp is controlled    13. Ambulates with cane    14. Vitreous hemorrhage, left  Comments:  being followed by eye md      Follow up in about 2 months (around 6/4/2025), or if symptoms worsen or fail to improve, for medication management.          Counseled on age and gender appropriate medical preventative services, including cancer screenings, immunizations, overall nutritional health, need for a consistent exercise regimen and an overall push towards maintaining a vigorous and active lifestyle.      This note was generated with the assistance of ambient listening technology. Verbal consent was obtained by the patient and accompanying visitor(s) for the recording of patient appointment to facilitate this note. I attest to having reviewed and edited the generated note for accuracy, though some syntax or spelling errors may persist. Please contact the author of this note for any clarification.       4/7/2025 Leana Perez NP         [1]   Current Outpatient Medications:     benazepriL (LOTENSIN) 10 MG tablet, Take 10 mg by mouth., Disp: , Rfl:     blood sugar diagnostic Strp, To check BG 3 times daily, to use with insurance preferred meter, Disp: 100 each, Rfl: 0    calcitRIOL (ROCALTROL) 0.25 MCG Cap, Take 0.25 mcg by mouth every 7 days., Disp: , Rfl:     furosemide (LASIX) 20 MG tablet, Take 1 tablet (20 mg total) by mouth once daily., Disp: 30 tablet, Rfl: 1    furosemide (LASIX) 40 MG tablet, Take 1 tablet (40 mg total) by mouth daily as needed., Disp: 30 tablet, Rfl: 1    lancets Misc, To check BG 3 times daily, to use with insurance preferred meter, Disp: 100 each, Rfl: 0     "lovastatin (MEVACOR) 20 MG tablet, Take 1 tablet (20 mg total) by mouth every evening., Disp: 90 tablet, Rfl: 1    mupirocin (BACTROBAN) 2 % ointment, Apply topically 2 (two) times daily., Disp: 22 g, Rfl: 11    pen needle, diabetic 32 gauge x 5/32" Ndle, 1 each by Misc.(Non-Drug; Combo Route) route once daily at 6am., Disp: 100 each, Rfl: 3    prednisoLONE acetate (PRED FORTE) 1 % DrpS, Apply 1 drop to eye 4 (four) times daily., Disp: , Rfl:     SSD 1 % cream, Apply topically 2 (two) times daily., Disp: 400 g, Rfl: 1    TOUJEO MAX U-300 SOLOSTAR 300 unit/mL (3 mL) insulin pen, Inject 46 Units into the skin once daily., Disp: 9 mL, Rfl: 11    warfarin (COUMADIN) 5 MG tablet, Take 2.5 mg by mouth once daily. Pt states he takes 5mg Mon-Fri and 2.5mg Sat and Sun, Disp: , Rfl:     zolpidem (AMBIEN) 10 mg Tab, Take 1 tablet (10 mg total) by mouth nightly as needed (sleep)., Disp: 90 tablet, Rfl: 0    blood-glucose meter kit, To check BG 3 times daily, to use with insurance preferred meter, Disp: 1 each, Rfl: 0    nitroGLYCERIN (NITROSTAT) 0.4 MG SL tablet, Place 0.4 mg under the tongue., Disp: , Rfl:     "

## 2025-04-29 ENCOUNTER — TELEPHONE (OUTPATIENT)
Dept: FAMILY MEDICINE | Facility: CLINIC | Age: 89
End: 2025-04-29
Payer: MEDICARE

## 2025-04-29 DIAGNOSIS — N18.2 TYPE 2 DIABETES MELLITUS WITH STAGE 2 CHRONIC KIDNEY DISEASE, WITH LONG-TERM CURRENT USE OF INSULIN: ICD-10-CM

## 2025-04-29 DIAGNOSIS — E11.22 TYPE 2 DIABETES MELLITUS WITH STAGE 2 CHRONIC KIDNEY DISEASE, WITH LONG-TERM CURRENT USE OF INSULIN: ICD-10-CM

## 2025-04-29 DIAGNOSIS — R60.9 EDEMA, UNSPECIFIED TYPE: ICD-10-CM

## 2025-04-29 DIAGNOSIS — E78.2 MIXED HYPERLIPIDEMIA: Primary | ICD-10-CM

## 2025-04-29 DIAGNOSIS — N18.4 CHRONIC KIDNEY DISEASE (CKD), STAGE IV (SEVERE): ICD-10-CM

## 2025-04-29 DIAGNOSIS — Z79.4 TYPE 2 DIABETES MELLITUS WITH STAGE 2 CHRONIC KIDNEY DISEASE, WITH LONG-TERM CURRENT USE OF INSULIN: ICD-10-CM

## 2025-05-01 RX ORDER — FUROSEMIDE 40 MG/1
40 TABLET ORAL DAILY PRN
Qty: 30 TABLET | Refills: 0 | Status: SHIPPED | OUTPATIENT
Start: 2025-05-01

## 2025-06-02 DIAGNOSIS — G47.00 INSOMNIA, UNSPECIFIED TYPE: ICD-10-CM

## 2025-06-02 DIAGNOSIS — R60.9 EDEMA, UNSPECIFIED TYPE: ICD-10-CM

## 2025-06-02 RX ORDER — FUROSEMIDE 40 MG/1
40 TABLET ORAL DAILY PRN
Qty: 30 TABLET | Refills: 0 | Status: SHIPPED | OUTPATIENT
Start: 2025-06-02

## 2025-06-02 RX ORDER — ZOLPIDEM TARTRATE 10 MG/1
10 TABLET ORAL NIGHTLY PRN
Qty: 90 TABLET | Refills: 0 | Status: SHIPPED | OUTPATIENT
Start: 2025-06-02

## 2025-06-02 RX ORDER — INSULIN GLARGINE 300 U/ML
45 INJECTION, SOLUTION SUBCUTANEOUS DAILY
Qty: 9 ML | Refills: 11 | Status: SHIPPED | OUTPATIENT
Start: 2025-06-02

## 2025-06-20 ENCOUNTER — TELEPHONE (OUTPATIENT)
Dept: FAMILY MEDICINE | Facility: CLINIC | Age: 89
End: 2025-06-20
Payer: MEDICARE

## 2025-07-03 ENCOUNTER — OFFICE VISIT (OUTPATIENT)
Dept: FAMILY MEDICINE | Facility: CLINIC | Age: 89
End: 2025-07-03
Payer: MEDICARE

## 2025-07-03 VITALS
BODY MASS INDEX: 38.98 KG/M2 | WEIGHT: 257.19 LBS | OXYGEN SATURATION: 99 % | HEIGHT: 68 IN | DIASTOLIC BLOOD PRESSURE: 72 MMHG | SYSTOLIC BLOOD PRESSURE: 136 MMHG | HEART RATE: 69 BPM

## 2025-07-03 DIAGNOSIS — Z79.4 TYPE 2 DIABETES MELLITUS WITH STAGE 2 CHRONIC KIDNEY DISEASE, WITH LONG-TERM CURRENT USE OF INSULIN: Primary | ICD-10-CM

## 2025-07-03 DIAGNOSIS — R60.9 EDEMA, UNSPECIFIED TYPE: ICD-10-CM

## 2025-07-03 DIAGNOSIS — E11.22 TYPE 2 DIABETES MELLITUS WITH STAGE 2 CHRONIC KIDNEY DISEASE, WITH LONG-TERM CURRENT USE OF INSULIN: Primary | ICD-10-CM

## 2025-07-03 DIAGNOSIS — G47.00 INSOMNIA, UNSPECIFIED TYPE: ICD-10-CM

## 2025-07-03 DIAGNOSIS — N18.4 CHRONIC KIDNEY DISEASE (CKD), STAGE IV (SEVERE): ICD-10-CM

## 2025-07-03 DIAGNOSIS — I48.0 PAROXYSMAL ATRIAL FIBRILLATION: ICD-10-CM

## 2025-07-03 DIAGNOSIS — E78.5 HYPERLIPIDEMIA, UNSPECIFIED HYPERLIPIDEMIA TYPE: ICD-10-CM

## 2025-07-03 DIAGNOSIS — I10 PRIMARY HYPERTENSION: ICD-10-CM

## 2025-07-03 DIAGNOSIS — Z99.89 AMBULATES WITH CANE: ICD-10-CM

## 2025-07-03 DIAGNOSIS — N18.2 TYPE 2 DIABETES MELLITUS WITH STAGE 2 CHRONIC KIDNEY DISEASE, WITH LONG-TERM CURRENT USE OF INSULIN: Primary | ICD-10-CM

## 2025-07-03 DIAGNOSIS — Z79.899 HIGH RISK MEDICATION USE: ICD-10-CM

## 2025-07-03 LAB — HBA1C MFR BLD: 7.6 %

## 2025-07-03 PROCEDURE — 99214 OFFICE O/P EST MOD 30 MIN: CPT | Mod: S$GLB,,, | Performed by: NURSE PRACTITIONER

## 2025-07-03 PROCEDURE — G2211 COMPLEX E/M VISIT ADD ON: HCPCS | Mod: S$GLB,,, | Performed by: NURSE PRACTITIONER

## 2025-07-03 PROCEDURE — 83036 HEMOGLOBIN GLYCOSYLATED A1C: CPT | Mod: QW,,, | Performed by: NURSE PRACTITIONER

## 2025-07-03 RX ORDER — FUROSEMIDE 40 MG/1
40 TABLET ORAL DAILY PRN
Qty: 30 TABLET | Refills: 0 | Status: SHIPPED | OUTPATIENT
Start: 2025-07-03

## 2025-07-03 RX ORDER — WARFARIN SODIUM 5 MG/1
2.5 TABLET ORAL
Status: CANCELLED | OUTPATIENT
Start: 2025-07-03

## 2025-07-03 RX ORDER — LOVASTATIN 20 MG/1
20 TABLET ORAL NIGHTLY
Qty: 90 TABLET | Refills: 1 | Status: SHIPPED | OUTPATIENT
Start: 2025-07-03

## 2025-07-03 RX ORDER — FUROSEMIDE 20 MG/1
20 TABLET ORAL DAILY
Qty: 30 TABLET | Refills: 1 | Status: SHIPPED | OUTPATIENT
Start: 2025-07-03

## 2025-07-03 RX ORDER — INSULIN GLARGINE 300 U/ML
45 INJECTION, SOLUTION SUBCUTANEOUS DAILY
Qty: 9 ML | Refills: 11 | Status: SHIPPED | OUTPATIENT
Start: 2025-07-03

## 2025-07-03 RX ORDER — ZOLPIDEM TARTRATE 10 MG/1
10 TABLET ORAL NIGHTLY PRN
Qty: 90 TABLET | Refills: 0 | Status: SHIPPED | OUTPATIENT
Start: 2025-07-03

## 2025-07-06 NOTE — PROGRESS NOTES
SUBJECTIVE:    Patient ID: Alex Hatch is a 89 y.o. male.    Chief Complaint: Follow-up (No bottles//Pt is here for a 3 month follow up//KE)      History of Present Illness    CHIEF COMPLAINT:  89 year old presents today for medication refills.    DIABETES:  He reports improvement in glycemic control with A1C decreasing from 9.2 to 7.6, which he acknowledges as a significant positive change. He appears motivated to continue managing diabetes.    CHRONIC KIDNEY DISEASE:  He reports ongoing kidney issues managed by Dr. Silva. His kidney function has improved to 28, which was confirmed by recent labs. His next nephrology follow-up is scheduled for January. He continues calcium supplementation for associated bone health concerns. His nephrologist indicates his kidney condition is currently stable.    ANTICOAGULATION:  He takes Coumadin with a regimen of one pill daily and half pill on two days per week, reporting good response. He understands the need for monitoring every 10-12 days but declines home monitoring despite discussion of potential convenience.    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    DIET:  He skips breakfast daily, primarily eats lunch out, and has minimal evening intake with occasional light snacks such as cookies. He obtains groceries by personally shopping at the store.      ROS:  General: -fever, -chills, -fatigue, -weight gain, -weight loss, +malaise, +body aches  Eyes: -vision changes, -redness, -discharge, +loss of vision  ENT: -ear pain, -nasal congestion, -sore throat  Cardiovascular: -chest pain, -palpitations, -lower extremity edema  Respiratory: -cough, -shortness of breath  Gastrointestinal: -abdominal pain, -nausea, -vomiting, -diarrhea, -constipation, -blood in stool, +loss of appetite  Genitourinary: -dysuria, -hematuria, -frequency  Musculoskeletal: -joint pain, -muscle pain  Skin: -rash, -lesion  Neurological: -headache,  -dizziness, -numbness, -tingling  Psychiatric: -anxiety, -depression, -sleep difficulty              Office Visit on 07/03/2025   Component Date Value Ref Range Status    Hemoglobin A1C, POC 07/03/2025 7.6  % Final   Telephone on 04/29/2025   Component Date Value Ref Range Status    WBC 06/19/2025 7.6  3.8 - 10.8 Thousand/uL Final    RBC 06/19/2025 4.98  4.20 - 5.80 Million/uL Final    Hemoglobin 06/19/2025 13.8  13.2 - 17.1 g/dL Final    Hematocrit 06/19/2025 44.2  38.5 - 50.0 % Final    MCV 06/19/2025 88.8  80.0 - 100.0 fL Final    MCH 06/19/2025 27.7  27.0 - 33.0 pg Final    MCHC 06/19/2025 31.2 (L)  32.0 - 36.0 g/dL Final    RDW 06/19/2025 13.8  11.0 - 15.0 % Final    Platelets 06/19/2025 181  140 - 400 Thousand/uL Final    MPV 06/19/2025 10.6  7.5 - 12.5 fL Final    Neutrophils, Abs 06/19/2025 4,917  1,500 - 7,800 cells/uL Final    Lymph # 06/19/2025 1,535  850 - 3,900 cells/uL Final    Mono # 06/19/2025 768  200 - 950 cells/uL Final    Eos # 06/19/2025 319  15 - 500 cells/uL Final    Baso # 06/19/2025 61  0 - 200 cells/uL Final    Neutrophils Relative 06/19/2025 64.7  % Final    Lymph % 06/19/2025 20.2  % Final    Mono % 06/19/2025 10.1  % Final    Eosinophil % 06/19/2025 4.2  % Final    Basophil % 06/19/2025 0.8  % Final    Glucose 06/19/2025 68  65 - 99 mg/dL Final    BUN 06/19/2025 39 (H)  7 - 25 mg/dL Final    Creatinine 06/19/2025 2.21 (H)  0.70 - 1.22 mg/dL Final    eGFR 06/19/2025 28 (L)  > OR = 60 mL/min/1.73m2 Final    BUN/Creatinine Ratio 06/19/2025 18  6 - 22 (calc) Final    Sodium 06/19/2025 140  135 - 146 mmol/L Final    Potassium 06/19/2025 3.9  3.5 - 5.3 mmol/L Final    Chloride 06/19/2025 107  98 - 110 mmol/L Final    CO2 06/19/2025 24  20 - 32 mmol/L Final    Calcium 06/19/2025 8.9  8.6 - 10.3 mg/dL Final    Total Protein 06/19/2025 7.1  6.1 - 8.1 g/dL Final    Albumin 06/19/2025 4.1  3.6 - 5.1 g/dL Final    Globulin, Total 06/19/2025 3.0  1.9 - 3.7 g/dL (calc) Final    Albumin/Globulin Ratio  06/19/2025 1.4  1.0 - 2.5 (calc) Final    Total Bilirubin 06/19/2025 0.7  0.2 - 1.2 mg/dL Final    Alkaline Phosphatase 06/19/2025 80  35 - 144 U/L Final    AST 06/19/2025 9 (L)  10 - 35 U/L Final    ALT 06/19/2025 6 (L)  9 - 46 U/L Final    TSH w/reflex to FT4 06/19/2025 4.29  0.40 - 4.50 mIU/L Final    Cholesterol 06/19/2025 117  <200 mg/dL Final    HDL 06/19/2025 36 (L)  > OR = 40 mg/dL Final    Triglycerides 06/19/2025 61  <150 mg/dL Final    LDL Cholesterol 06/19/2025 67  mg/dL (calc) Final    HDL/Cholesterol Ratio 06/19/2025 3.3  <5.0 (calc) Final    Non HDL Chol. (LDL+VLDL) 06/19/2025 81  <130 mg/dL (calc) Final   Orders Only on 04/04/2025   Component Date Value Ref Range Status    Left Eye DM Retinopathy 03/07/2025 Negative   Final    Right Eye DM Retinopathy 03/07/2025 Negative   Final   Office Visit on 04/04/2025   Component Date Value Ref Range Status    Hemoglobin A1C, POC 04/04/2025 9.2  % Final       Past Medical History:   Diagnosis Date    Atrial fibrillation     Dr Lux    CKD (chronic kidney disease), stage III     Coronary artery disease     Diabetes mellitus, type 2     Hyperlipidemia     Hypertension      Past Surgical History:   Procedure Laterality Date    CARDIAC PACEMAKER PLACEMENT  05/2013    Dr Lux    CARDIAC PACEMAKER PLACEMENT  07/23/2024    COLONOSCOPY W/ POLYPECTOMY  2011    Dr Mar, repeat in 3 years    CORONARY ARTERY BYPASS GRAFT  1993    CYSTOSCOPY N/A 09/29/2021    Procedure: CYSTOSCOPY;  Surgeon: Ramona Skaggs Jr., MD;  [facility];  Service: Urology;  Laterality: N/A;    cassandra      TRANSRECTAL ULTRASOUND EXAMINATION N/A 09/29/2021    Procedure: ULTRASOUND, RECTAL APPROACH;  Surgeon: Ramona Skaggs Jr., MD;  [facility];  Service: Urology;  Laterality: N/A;    TRANSURETHRAL RESECTION OF PROSTATE N/A 10/26/2021    Procedure: TURP (TRANSURETHRAL RESECTION OF PROSTATE);  Surgeon: Ramona Skaggs Jr., MD;  Location: [facility];  Service: Urology;  Laterality: N/A;      No family history on file.    All of your core healthy metrics are met.      The CVD Risk score (LYNNETTE'Agostino, et al., 2008) failed to calculate for the following reasons:    The 2008 CVD risk score is only valid for ages 30 to 74     Marital Status:   Alcohol History:  reports no history of alcohol use.  Tobacco History:  reports that he has never smoked. He has never used smokeless tobacco.  Drug History:  reports no history of drug use.    Health Maintenance Topics with due status: Not Due       Topic Last Completion Date    TETANUS VACCINE 03/30/2019    Influenza Vaccine 09/18/2024    Diabetic Eye Exam 03/07/2025    Lipid Panel 06/19/2025    Hemoglobin A1c 07/03/2025     Immunization History   Administered Date(s) Administered    COVID-19, MRNA, LN-S, PF (Pfizer) (Gray Cap) 05/19/2022    COVID-19, MRNA, LN-S, PF (Pfizer) (Purple Cap) 03/05/2021, 03/26/2021, 10/18/2021    Influenza (FLUAD) - Quadrivalent - Adjuvanted - PF *Preferred* (65+) 09/14/2023    Influenza - Quadrivalent - High Dose - PF (65 years and older) 09/02/2020, 10/04/2021, 10/06/2022    Influenza - Quadrivalent - PF *Preferred* (6 months and older) 09/03/2013    Influenza - Trivalent - Afluria, Fluzone MDV 08/22/2010    Influenza - Trivalent - Fluzone High Dose - PF (65 years and older) 09/19/2015, 09/10/2016, 09/22/2018, 10/01/2019    Influenza Whole 09/18/2014    Pneumococcal Conjugate - 13 Valent 03/30/2019    Pneumococcal Polysaccharide - 23 Valent 01/15/2014    Tdap 03/30/2019    Zoster Recombinant 03/30/2019, 06/27/2019       Review of patient's allergies indicates:   Allergen Reactions    Macrobid [nitrofurantoin monohyd/m-cryst]     Iodinated contrast media      Betadine ok    Keflex [cephalexin]      diarrhea    Sitagliptin phosphate      Other reaction(s): Other (See Comments)     Current Medications[1]        Objective:      Vitals:    07/03/25 0848   BP: 136/72   Pulse: 69   SpO2: 99%   Weight: 116.7 kg (257 lb 3.2 oz)  "  Height: 5' 8" (1.727 m)       Physical Exam  Constitutional:       General: He is not in acute distress.     Appearance: Normal appearance. He is well-developed. He is obese.      Comments: cane   HENT:      Right Ear: External ear normal.      Left Ear: External ear normal.   Eyes:      Pupils: Pupils are equal, round, and reactive to light.   Neck:      Trachea: No tracheal deviation.   Cardiovascular:      Rate and Rhythm: Normal rate and regular rhythm.      Heart sounds: No murmur heard.     No friction rub. No gallop.   Pulmonary:      Breath sounds: Normal breath sounds. No stridor. No wheezing or rales.   Abdominal:      Palpations: Abdomen is soft. There is no mass.      Tenderness: There is no abdominal tenderness.   Musculoskeletal:         General: No tenderness or deformity.      Cervical back: Neck supple.   Lymphadenopathy:      Cervical: No cervical adenopathy.   Skin:     General: Skin is warm and dry.   Neurological:      Mental Status: He is alert and oriented to person, place, and time.      Coordination: Coordination normal.   Psychiatric:         Thought Content: Thought content normal.          Assessment:       1. Type 2 diabetes mellitus with stage 2 chronic kidney disease, with long-term current use of insulin    2. Edema, unspecified type    3. Hyperlipidemia, unspecified hyperlipidemia type    4. High risk medication use    5. Insomnia, unspecified type    6. Paroxysmal atrial fibrillation    7. Primary hypertension    8. Chronic kidney disease (CKD), stage IV (severe)    9. Ambulates with cane           Assessment & Plan    - Noted improvement in A1C from 9.2 to 7.6.  - Acknowledged improvement in renal function, with GFR increased to 28.  - Considered home Coumadin monitoring as potential solution to reduce frequent office visits for INR checks, but patient declined.    TYPE 2 DIABETES MELLITUS:  - Alex's A1C has improved from 9.2% to 7.6% since the last visit.  - Medication refills " have been managed.    CHRONIC KIDNEY DISEASE:  - Kidney function has improved to 28, though not perfect.  - Alex advised to continue taking calcium for bone health due to low bone density.    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  - Discussed potential procedure to drain blood from the eye, which would require travel to [facility].    ANTICOAGULATION MANAGEMENT:  - Continue Coumadin at current dose of 1 pill daily and half a pill on 2 days of the week.  - Anticoagulation is stable with current regimen managed by Nancy.  - Discussed possibility of home monitoring for Coumadin levels to reduce frequent travel for office visits.  - Will follow up as needed to set up home monitoring.    GENERAL RECOMMENDATIONS:  - Demonstrated how to use [employer] any for grocery delivery, explaining cost savings and convenience benefits.  - Recommend using this service to reduce need for in-person shopping.  - Alex should contact the office if an appointment needs to be scheduled.        Plan:       1. Type 2 diabetes mellitus with stage 2 chronic kidney disease, with long-term current use of insulin  Comments:  hga1c 7.6  Orders:  -     POCT HEMOGLOBIN A1C  -     Microalbumin/Creatinine Ratio, Urine; Future; Expected date: 07/03/2025    2. Edema, unspecified type  Comments:  lasix  Orders:  -     furosemide (LASIX) 40 MG tablet; Take 1 tablet (40 mg total) by mouth daily as needed.  Dispense: 30 tablet; Refill: 0    3. Hyperlipidemia, unspecified hyperlipidemia type  Comments:  continue lovastatin  Orders:  -     lovastatin (MEVACOR) 20 MG tablet; Take 1 tablet (20 mg total) by mouth every evening.  Dispense: 90 tablet; Refill: 1    4. High risk medication use  -     lovastatin (MEVACOR) 20 MG tablet; Take 1 tablet (20 mg total) by mouth every evening.  Dispense: 90 tablet; Refill: 1    5. Insomnia, unspecified type  Comments:  ambien  Orders:  -      zolpidem (AMBIEN) 10 mg Tab; Take 1 tablet (10 mg total) by mouth nightly as needed (sleep).  Dispense: 90 tablet; Refill: 0    6. Paroxysmal atrial fibrillation  Comments:  followed by cardio  Overview:  Dr Lux      7. Primary hypertension  Comments:  bp is controlled    8. Chronic kidney disease (CKD), stage IV (severe)  Comments:  followed by dr. lynch    9. Ambulates with cane    Other orders  -     TOUJEO MAX U-300 SOLOSTAR 300 unit/mL (3 mL) insulin pen; Inject 46 Units into the skin once daily.  Dispense: 9 mL; Refill: 11  -     furosemide (LASIX) 20 MG tablet; Take 1 tablet (20 mg total) by mouth once daily.  Dispense: 30 tablet; Refill: 1      Follow up in about 3 months (around 10/3/2025) for medication management, Diabetic Check-Up.          Counseled on age and gender appropriate medical preventative services, including cancer screenings, immunizations, overall nutritional health, need for a consistent exercise regimen and an overall push towards maintaining a vigorous and active lifestyle.      This note was generated with the assistance of ambient listening technology. Verbal consent was obtained by the patient and accompanying visitor(s) for the recording of patient appointment to facilitate this note. I attest to having reviewed and edited the generated note for accuracy, though some syntax or spelling errors may persist. Please contact the author of this note for any clarification.       7/5/2025 Leana Perez NP         [1]   Current Outpatient Medications:     benazepriL (LOTENSIN) 10 MG tablet, Take 10 mg by mouth., Disp: , Rfl:     blood sugar diagnostic Strp, To check BG 3 times daily, to use with insurance preferred meter, Disp: 100 each, Rfl: 0    calcitRIOL (ROCALTROL) 0.25 MCG Cap, Take 0.25 mcg by mouth every 7 days., Disp: , Rfl:     lancets Misc, To check BG 3 times daily, to use with insurance preferred meter, Disp: 100 each, Rfl: 0    mupirocin (BACTROBAN) 2 % ointment, Apply  "topically 2 (two) times daily., Disp: 22 g, Rfl: 11    nitroGLYCERIN (NITROSTAT) 0.4 MG SL tablet, Place 0.4 mg under the tongue., Disp: , Rfl:     pen needle, diabetic 32 gauge x 5/32" Ndle, 1 each by Misc.(Non-Drug; Combo Route) route once daily at 6am., Disp: 100 each, Rfl: 3    prednisoLONE acetate (PRED FORTE) 1 % DrpS, Apply 1 drop to eye 4 (four) times daily., Disp: , Rfl:     SSD 1 % cream, Apply topically 2 (two) times daily., Disp: 400 g, Rfl: 1    warfarin (COUMADIN) 5 MG tablet, Take 2.5 mg by mouth once daily. Pt states he takes 5mg Mon-Fri and 2.5mg Sat and Sun, Disp: , Rfl:     blood-glucose meter kit, To check BG 3 times daily, to use with insurance preferred meter, Disp: 1 each, Rfl: 0    furosemide (LASIX) 20 MG tablet, Take 1 tablet (20 mg total) by mouth once daily., Disp: 30 tablet, Rfl: 1    furosemide (LASIX) 40 MG tablet, Take 1 tablet (40 mg total) by mouth daily as needed., Disp: 30 tablet, Rfl: 0    lovastatin (MEVACOR) 20 MG tablet, Take 1 tablet (20 mg total) by mouth every evening., Disp: 90 tablet, Rfl: 1    TOUJEO MAX U-300 SOLOSTAR 300 unit/mL (3 mL) insulin pen, Inject 46 Units into the skin once daily., Disp: 9 mL, Rfl: 11    zolpidem (AMBIEN) 10 mg Tab, Take 1 tablet (10 mg total) by mouth nightly as needed (sleep)., Disp: 90 tablet, Rfl: 0    "

## 2025-09-05 DIAGNOSIS — G47.00 INSOMNIA, UNSPECIFIED TYPE: ICD-10-CM

## 2025-09-05 DIAGNOSIS — R60.9 EDEMA, UNSPECIFIED TYPE: ICD-10-CM

## 2025-09-05 RX ORDER — ZOLPIDEM TARTRATE 10 MG/1
10 TABLET ORAL NIGHTLY PRN
Qty: 90 TABLET | Refills: 0 | Status: SHIPPED | OUTPATIENT
Start: 2025-09-05

## 2025-09-05 RX ORDER — INSULIN GLARGINE 300 U/ML
45 INJECTION, SOLUTION SUBCUTANEOUS DAILY
Qty: 9 ML | Refills: 11 | Status: SHIPPED | OUTPATIENT
Start: 2025-09-05

## 2025-09-05 RX ORDER — FUROSEMIDE 20 MG/1
20 TABLET ORAL DAILY
Qty: 30 TABLET | Refills: 1 | Status: SHIPPED | OUTPATIENT
Start: 2025-09-05

## 2025-09-05 RX ORDER — FUROSEMIDE 40 MG/1
40 TABLET ORAL DAILY PRN
Qty: 30 TABLET | Refills: 0 | Status: SHIPPED | OUTPATIENT
Start: 2025-09-05

## (undated) DEVICE — GLOVE SURG ULTRA TOUCH 7.5

## (undated) DEVICE — SCRUB HIBICLENS 4% CHG 4OZ

## (undated) DEVICE — TUBING ARTHRO IRR 4-LEAD

## (undated) DEVICE — BOWL STERILE LARGE 32OZ

## (undated) DEVICE — SET CYSTO IRRIGATION UNIV SPIK

## (undated) DEVICE — SLEEVE SCD EXPRESS KNEE MEDIUM

## (undated) DEVICE — SCRUB 10% POVIDONE IODINE 4OZ

## (undated) DEVICE — SOL 9P NACL IRR PIC IL

## (undated) DEVICE — GOWN X-LG STERILE BACK

## (undated) DEVICE — SYR 30CC LUER LOCK

## (undated) DEVICE — SOL IRR NACL .9% 3000ML

## (undated) DEVICE — SYR 10CC LUER LOCK

## (undated) DEVICE — Device

## (undated) DEVICE — SEE MEDLINE ITEM 157185

## (undated) DEVICE — SPONGE BULKEE II ABSRB 6X6.75

## (undated) DEVICE — GOWN B1 X-LG X-LONG

## (undated) DEVICE — SHEET DRAPE MEDIUM

## (undated) DEVICE — CATH COUDE 18F 5CC W/STAT LOC

## (undated) DEVICE — ELECTRODE RESECTION LOOP LRGE

## (undated) DEVICE — WATER STERILE INJ 500ML BAG

## (undated) DEVICE — SYR 50ML CATH TIP

## (undated) DEVICE — ELECTRODE RESECTION BUTTON LRG

## (undated) DEVICE — BAG URINARY DRN 2000ML

## (undated) DEVICE — CONTAINER SPECIMEN STRL 4OZ

## (undated) DEVICE — BAG LINGEMAN DRAIN UROLOGY

## (undated) DEVICE — SPONGE GAUZE 16PLY 4X4